# Patient Record
Sex: MALE | Race: WHITE | Employment: UNEMPLOYED | URBAN - METROPOLITAN AREA
[De-identification: names, ages, dates, MRNs, and addresses within clinical notes are randomized per-mention and may not be internally consistent; named-entity substitution may affect disease eponyms.]

---

## 2017-01-17 RX ORDER — LISINOPRIL AND HYDROCHLOROTHIAZIDE 25; 20 MG/1; MG/1
TABLET ORAL
Qty: 30 TABLET | Refills: 0 | Status: SHIPPED | OUTPATIENT
Start: 2017-01-17 | End: 2017-03-27 | Stop reason: SDUPTHER

## 2017-03-27 RX ORDER — LISINOPRIL AND HYDROCHLOROTHIAZIDE 25; 20 MG/1; MG/1
TABLET ORAL
Qty: 30 TABLET | Refills: 0 | Status: SHIPPED | OUTPATIENT
Start: 2017-03-27 | End: 2017-03-28 | Stop reason: SDUPTHER

## 2017-03-28 ENCOUNTER — OFFICE VISIT (OUTPATIENT)
Dept: FAMILY MEDICINE CLINIC | Age: 72
End: 2017-03-28

## 2017-03-28 VITALS
HEART RATE: 76 BPM | DIASTOLIC BLOOD PRESSURE: 82 MMHG | SYSTOLIC BLOOD PRESSURE: 122 MMHG | BODY MASS INDEX: 28.4 KG/M2 | WEIGHT: 227.2 LBS

## 2017-03-28 DIAGNOSIS — N40.1 BENIGN NON-NODULAR PROSTATIC HYPERPLASIA WITH LOWER URINARY TRACT SYMPTOMS: Chronic | ICD-10-CM

## 2017-03-28 DIAGNOSIS — I10 ESSENTIAL HYPERTENSION: ICD-10-CM

## 2017-03-28 DIAGNOSIS — R35.0 FREQUENT URINATION: Primary | ICD-10-CM

## 2017-03-28 LAB
BILIRUBIN, POC: NORMAL
BLOOD URINE, POC: NORMAL
CLARITY, POC: CLEAR
COLOR, POC: YELLOW
GLUCOSE URINE, POC: NORMAL
KETONES, POC: 15
LEUKOCYTE EST, POC: NORMAL
NITRITE, POC: NORMAL
PH, POC: 5
PROTEIN, POC: 30
SPECIFIC GRAVITY, POC: 1.02
UROBILINOGEN, POC: 0.2

## 2017-03-28 PROCEDURE — 99214 OFFICE O/P EST MOD 30 MIN: CPT

## 2017-03-28 PROCEDURE — 81003 URINALYSIS AUTO W/O SCOPE: CPT

## 2017-03-28 RX ORDER — LISINOPRIL AND HYDROCHLOROTHIAZIDE 25; 20 MG/1; MG/1
TABLET ORAL
Qty: 90 TABLET | Refills: 3 | Status: SHIPPED | OUTPATIENT
Start: 2017-03-28 | End: 2018-05-31 | Stop reason: SDUPTHER

## 2017-03-29 ASSESSMENT — ENCOUNTER SYMPTOMS
GASTROINTESTINAL NEGATIVE: 1
BLURRED VISION: 0
COUGH: 0
SHORTNESS OF BREATH: 0
BACK PAIN: 0

## 2017-04-25 ENCOUNTER — OFFICE VISIT (OUTPATIENT)
Dept: FAMILY MEDICINE CLINIC | Age: 72
End: 2017-04-25

## 2017-04-25 VITALS
WEIGHT: 223.8 LBS | HEIGHT: 76 IN | DIASTOLIC BLOOD PRESSURE: 78 MMHG | TEMPERATURE: 98.8 F | BODY MASS INDEX: 27.25 KG/M2 | SYSTOLIC BLOOD PRESSURE: 116 MMHG

## 2017-04-25 DIAGNOSIS — J06.9 UPPER RESPIRATORY TRACT INFECTION, UNSPECIFIED TYPE: Primary | ICD-10-CM

## 2017-04-25 DIAGNOSIS — J40 BRONCHITIS: ICD-10-CM

## 2017-04-25 PROCEDURE — 99213 OFFICE O/P EST LOW 20 MIN: CPT

## 2017-04-25 RX ORDER — AZITHROMYCIN 250 MG/1
TABLET, FILM COATED ORAL
Qty: 1 PACKET | Refills: 0 | Status: SHIPPED | OUTPATIENT
Start: 2017-04-25 | End: 2017-05-05

## 2017-04-25 ASSESSMENT — ENCOUNTER SYMPTOMS
RHINORRHEA: 1
HEARTBURN: 0
COUGH: 1
SORE THROAT: 1
SHORTNESS OF BREATH: 0

## 2017-08-08 ENCOUNTER — NURSE ONLY (OUTPATIENT)
Dept: FAMILY MEDICINE CLINIC | Age: 72
End: 2017-08-08

## 2017-08-08 VITALS — SYSTOLIC BLOOD PRESSURE: 159 MMHG | DIASTOLIC BLOOD PRESSURE: 100 MMHG

## 2017-08-08 DIAGNOSIS — I10 ESSENTIAL HYPERTENSION: Primary | ICD-10-CM

## 2017-09-13 ENCOUNTER — OFFICE VISIT (OUTPATIENT)
Dept: FAMILY MEDICINE CLINIC | Age: 72
End: 2017-09-13

## 2017-09-13 VITALS
WEIGHT: 226.6 LBS | DIASTOLIC BLOOD PRESSURE: 70 MMHG | BODY MASS INDEX: 27.95 KG/M2 | SYSTOLIC BLOOD PRESSURE: 126 MMHG | HEART RATE: 72 BPM

## 2017-09-13 DIAGNOSIS — Z11.59 NEED FOR HEPATITIS C SCREENING TEST: ICD-10-CM

## 2017-09-13 DIAGNOSIS — Z13.1 ENCOUNTER FOR SCREENING FOR DIABETES MELLITUS: ICD-10-CM

## 2017-09-13 DIAGNOSIS — G47.30 SLEEP APNEA, UNSPECIFIED TYPE: ICD-10-CM

## 2017-09-13 DIAGNOSIS — I10 ESSENTIAL HYPERTENSION: Primary | ICD-10-CM

## 2017-09-13 DIAGNOSIS — B36.9 FUNGAL DERMATITIS: ICD-10-CM

## 2017-09-13 PROCEDURE — 99214 OFFICE O/P EST MOD 30 MIN: CPT | Performed by: FAMILY MEDICINE

## 2017-09-13 PROCEDURE — 90471 IMMUNIZATION ADMIN: CPT | Performed by: FAMILY MEDICINE

## 2017-09-13 PROCEDURE — 90732 PPSV23 VACC 2 YRS+ SUBQ/IM: CPT | Performed by: FAMILY MEDICINE

## 2017-09-13 PROCEDURE — 90688 IIV4 VACCINE SPLT 0.5 ML IM: CPT | Performed by: FAMILY MEDICINE

## 2017-09-13 PROCEDURE — 90472 IMMUNIZATION ADMIN EACH ADD: CPT | Performed by: FAMILY MEDICINE

## 2017-09-13 RX ORDER — KETOCONAZOLE 20 MG/G
1 CREAM TOPICAL 2 TIMES DAILY
Qty: 60 G | Refills: 3 | Status: SHIPPED | OUTPATIENT
Start: 2017-09-13 | End: 2019-04-29

## 2017-09-13 ASSESSMENT — PATIENT HEALTH QUESTIONNAIRE - PHQ9
SUM OF ALL RESPONSES TO PHQ QUESTIONS 1-9: 0
SUM OF ALL RESPONSES TO PHQ9 QUESTIONS 1 & 2: 0
1. LITTLE INTEREST OR PLEASURE IN DOING THINGS: 0
2. FEELING DOWN, DEPRESSED OR HOPELESS: 0

## 2018-05-31 RX ORDER — LISINOPRIL AND HYDROCHLOROTHIAZIDE 25; 20 MG/1; MG/1
TABLET ORAL
Qty: 30 TABLET | Refills: 0 | Status: SHIPPED | OUTPATIENT
Start: 2018-05-31 | End: 2018-07-13 | Stop reason: SDUPTHER

## 2018-07-13 RX ORDER — LISINOPRIL AND HYDROCHLOROTHIAZIDE 25; 20 MG/1; MG/1
TABLET ORAL
Qty: 30 TABLET | Refills: 0 | Status: SHIPPED | OUTPATIENT
Start: 2018-07-13 | End: 2018-08-03 | Stop reason: SDUPTHER

## 2018-08-03 ENCOUNTER — OFFICE VISIT (OUTPATIENT)
Dept: FAMILY MEDICINE CLINIC | Age: 73
End: 2018-08-03

## 2018-08-03 VITALS
WEIGHT: 229.6 LBS | OXYGEN SATURATION: 96 % | SYSTOLIC BLOOD PRESSURE: 126 MMHG | BODY MASS INDEX: 28.55 KG/M2 | DIASTOLIC BLOOD PRESSURE: 72 MMHG | HEIGHT: 75 IN | HEART RATE: 74 BPM

## 2018-08-03 DIAGNOSIS — G47.30 SLEEP APNEA, UNSPECIFIED TYPE: ICD-10-CM

## 2018-08-03 DIAGNOSIS — I10 ESSENTIAL HYPERTENSION: Primary | ICD-10-CM

## 2018-08-03 DIAGNOSIS — M79.672 PAIN OF LEFT HEEL: ICD-10-CM

## 2018-08-03 DIAGNOSIS — M25.572 LEFT ANKLE PAIN, UNSPECIFIED CHRONICITY: ICD-10-CM

## 2018-08-03 DIAGNOSIS — R42 VERTIGO: ICD-10-CM

## 2018-08-03 DIAGNOSIS — Z72.89 OTHER PROBLEMS RELATED TO LIFESTYLE: ICD-10-CM

## 2018-08-03 DIAGNOSIS — D36.9 ADENOMA: ICD-10-CM

## 2018-08-03 PROCEDURE — 99214 OFFICE O/P EST MOD 30 MIN: CPT | Performed by: FAMILY MEDICINE

## 2018-08-03 RX ORDER — LISINOPRIL AND HYDROCHLOROTHIAZIDE 25; 20 MG/1; MG/1
TABLET ORAL
Qty: 90 TABLET | Refills: 1 | Status: SHIPPED | OUTPATIENT
Start: 2018-08-03 | End: 2019-04-10 | Stop reason: SDUPTHER

## 2018-08-03 NOTE — PROGRESS NOTES
Subjective:  Mara Benítez presents for   Chief Complaint   Patient presents with    Hypertension     It has been good     Sleep Apnea    Ankle Pain     left and has been going on for 4-6 weeks     Dizziness     started 2 weeks ago        Is using the cpap    Step in something and has a callous on the heel. lorenza orccured a long time ago. Can play golf ok. Wearing shoes are better. Doesn't check the bp at home. Is cpap machine is old and breaking, needs a repleacement. He usese it every night and it has been very use ful for him. Would like a replace,net    everyonce in a while he will get up wuickly and lffeel lightl headed. Patient Active Problem List   Diagnosis    Erectile dysfunction    Sleep apnea    Osteoarthritis    DJD (degenerative joint disease)    Hernia    BPH (benign prostatic hyperplasia)    Essential hypertension       Review of Systems:  · General: no significant weight changes. · Respiratory: no cough, pleuritic chest pain, dyspnea, or wheezing  · Cardiovascular: no pain, HARVEY, orthopnea, palpitations, or claudication  · Gastrointestinal: no chronic nausea, vomiting, heartburn, diarrhea, constipation, bloating, or abdominal pain. No bloody or black stools. Objective:  Physical Exam   Vitals:   Vitals:    08/03/18 1134   BP: 126/72   Pulse: 74   SpO2: 96%   Weight: 229 lb 9.6 oz (104.1 kg)   Height: 6' 3\" (1.905 m)     Wt Readings from Last 3 Encounters:   08/03/18 229 lb 9.6 oz (104.1 kg)   09/13/17 226 lb 9.6 oz (102.8 kg)   04/25/17 223 lb 12.8 oz (101.5 kg)     Ht Readings from Last 3 Encounters:   08/03/18 6' 3\" (1.905 m)   04/25/17 6' 3.5\" (1.918 m)   11/17/15 6' 3\" (1.905 m)     Body mass index is 28.7 kg/m². Constitutional: He is oriented to person, place, and time. He appears well-developed and well-nourished and in no acute distress. Answers all my questions appropriately. Head: Normocephalic and atraumatic.    Eyes:conjunctiva appear normal.  Right Ear: External ear normal. TM is clear  Left Ear: External ear normal. TM is clear  Nose: pink, non-edematous mucosa. No polyps. No septal deviation  Throat: no erythema, tonsillar hypertrophy or exudate. No ulcerations noted. Lips/Teeth/Gums all appear normal.  Neck: Normal range of motion. Neck supple. No tracheal deviation present. No abnormal lymphadenopathy. No JVD noted. Carotids are clear bilaterally. No thyroid masses noted. Heart: RRR without murmur. No S3, S4, or gallop noted. Chest: Clear to auscultation bilaterally. Good breath sounds noted. No rales, wheezes, or rhonchi noted. No respiratory retractions noted. Wall has symmetrical movement with respirations. Left heel - he has a large  (2cm) palntars wart present. Has mild left lateral ankle pain when I twist it. Assessment:   Encounter Diagnoses   Name Primary?  Essential hypertension Yes    Sleep apnea, unspecified type     Left ankle pain, unspecified chronicity     Vertigo     Other problems related to lifestyle     Adenoma     Pain of left heel          Plan:   Medications Discontinued During This Encounter   Medication Reason    lisinopril-hydrochlorothiazide (PRINZIDE;ZESTORETIC) 20-25 MG per tablet REORDER     THE ABOVE NOTED DISCONTINUED MEDS MAY ONLY BE FROM 'CLEANING UP' THE MED LIST AND WERE NOT ACTUALLY CANCELED;  SEE CHART FOR DETAILS!   Orders Placed This Encounter   Medications    CPAP Machine MISC     Sig: by Does not apply route For continuous use at HS    DX: eliane     Dispense:  1 each     Refill:  0    Respiratory Therapy Supplies MISC     Si each by Does not apply route continuous To use with CPAP machine     Dispense:  1 each     Refill:  11    Respiratory Therapy Supplies MISC     Si each by Does not apply route continuous To use with the CPAP machine     Dispense:  1 each     Refill:  11    lisinopril-hydrochlorothiazide (PRINZIDE;ZESTORETIC) 20-25 MG per tablet     Sig: take 1 tablet by mouth once

## 2018-08-06 ENCOUNTER — HOSPITAL ENCOUNTER (OUTPATIENT)
Age: 73
Setting detail: SPECIMEN
Discharge: HOME | End: 2018-08-06
Payer: MEDICARE

## 2018-08-06 DIAGNOSIS — I10 ESSENTIAL HYPERTENSION: ICD-10-CM

## 2018-08-06 DIAGNOSIS — Z72.89 OTHER PROBLEMS RELATED TO LIFESTYLE: ICD-10-CM

## 2018-08-06 LAB
ABSOLUTE EOS #: 0.12 K/UL (ref 0–0.44)
ABSOLUTE IMMATURE GRANULOCYTE: 0.03 K/UL (ref 0–0.3)
ABSOLUTE LYMPH #: 0.96 K/UL (ref 1.1–3.7)
ABSOLUTE MONO #: 0.52 K/UL (ref 0.1–1.2)
ALBUMIN SERPL-MCNC: 4.5 G/DL (ref 3.5–5.2)
ALBUMIN/GLOBULIN RATIO: 1.8 (ref 1–2.5)
ALP BLD-CCNC: 68 U/L (ref 40–129)
ALT SERPL-CCNC: 20 U/L (ref 5–41)
ANION GAP SERPL CALCULATED.3IONS-SCNC: 13 MMOL/L (ref 9–17)
AST SERPL-CCNC: 24 U/L
BASOPHILS # BLD: 1 % (ref 0–2)
BASOPHILS ABSOLUTE: 0.07 K/UL (ref 0–0.2)
BILIRUB SERPL-MCNC: 0.52 MG/DL (ref 0.3–1.2)
BUN BLDV-MCNC: 22 MG/DL (ref 8–23)
BUN/CREAT BLD: ABNORMAL (ref 9–20)
CALCIUM SERPL-MCNC: 9.5 MG/DL (ref 8.6–10.4)
CHLORIDE BLD-SCNC: 101 MMOL/L (ref 98–107)
CHOLESTEROL/HDL RATIO: 7.3
CHOLESTEROL: 247 MG/DL
CO2: 26 MMOL/L (ref 20–31)
CREAT SERPL-MCNC: 1.16 MG/DL (ref 0.7–1.2)
DIFFERENTIAL TYPE: ABNORMAL
EOSINOPHILS RELATIVE PERCENT: 2 % (ref 1–4)
GFR AFRICAN AMERICAN: >60 ML/MIN
GFR NON-AFRICAN AMERICAN: >60 ML/MIN
GFR SERPL CREATININE-BSD FRML MDRD: ABNORMAL ML/MIN/{1.73_M2}
GFR SERPL CREATININE-BSD FRML MDRD: ABNORMAL ML/MIN/{1.73_M2}
GLUCOSE BLD-MCNC: 106 MG/DL (ref 70–99)
HCT VFR BLD CALC: 48.1 % (ref 40.7–50.3)
HDLC SERPL-MCNC: 34 MG/DL
HEMOGLOBIN: 16.2 G/DL (ref 13–17)
HEPATITIS C ANTIBODY: NONREACTIVE
IMMATURE GRANULOCYTES: 1 %
LDL CHOLESTEROL DIRECT: 98 MG/DL
LDL CHOLESTEROL: ABNORMAL MG/DL (ref 0–130)
LYMPHOCYTES # BLD: 16 % (ref 24–43)
MCH RBC QN AUTO: 31.5 PG (ref 25.2–33.5)
MCHC RBC AUTO-ENTMCNC: 33.7 G/DL (ref 28.4–34.8)
MCV RBC AUTO: 93.6 FL (ref 82.6–102.9)
MONOCYTES # BLD: 9 % (ref 3–12)
NRBC AUTOMATED: 0 PER 100 WBC
PDW BLD-RTO: 13 % (ref 11.8–14.4)
PLATELET # BLD: 121 K/UL (ref 138–453)
PLATELET ESTIMATE: ABNORMAL
PMV BLD AUTO: 12.7 FL (ref 8.1–13.5)
POTASSIUM SERPL-SCNC: 4.3 MMOL/L (ref 3.7–5.3)
RBC # BLD: 5.14 M/UL (ref 4.21–5.77)
RBC # BLD: ABNORMAL 10*6/UL
SEG NEUTROPHILS: 71 % (ref 36–65)
SEGMENTED NEUTROPHILS ABSOLUTE COUNT: 4.3 K/UL (ref 1.5–8.1)
SODIUM BLD-SCNC: 140 MMOL/L (ref 135–144)
TOTAL PROTEIN: 7 G/DL (ref 6.4–8.3)
TRIGL SERPL-MCNC: 445 MG/DL
TSH SERPL DL<=0.05 MIU/L-ACNC: 3.81 MIU/L (ref 0.3–5)
VLDLC SERPL CALC-MCNC: ABNORMAL MG/DL (ref 1–30)
WBC # BLD: 6 K/UL (ref 3.5–11.3)
WBC # BLD: ABNORMAL 10*3/UL

## 2018-08-08 ENCOUNTER — TELEPHONE (OUTPATIENT)
Dept: FAMILY MEDICINE CLINIC | Age: 73
End: 2018-08-08

## 2018-08-08 NOTE — TELEPHONE ENCOUNTER
Pharmacy counter did not get the fax, please re fax    Patient would like you to call him and confirm it was sent

## 2018-08-09 NOTE — TELEPHONE ENCOUNTER
Found post it note on my desk when arriving saying that patient wants to pick order up.  lmor for patient, order is ready for pickup.

## 2019-04-10 RX ORDER — LISINOPRIL AND HYDROCHLOROTHIAZIDE 25; 20 MG/1; MG/1
TABLET ORAL
Qty: 90 TABLET | Refills: 3 | Status: SHIPPED | OUTPATIENT
Start: 2019-04-10 | End: 2020-04-27

## 2019-04-10 NOTE — TELEPHONE ENCOUNTER
Last visit: 08/03/18  Last Med refill:   Does patient have enough medication for 72 hours:     Next Visit Date:  No future appointments.     Health Maintenance   Topic Date Due    DTaP/Tdap/Td vaccine (1 - Tdap) 10/25/1965    Shingles Vaccine (1 of 2) 10/25/1996    Colon cancer screen colonoscopy  08/16/2018    Pneumococcal 65+ years Vaccine (2 of 2 - PCV13) 09/13/2018    Potassium monitoring  08/06/2019    Creatinine monitoring  08/06/2019    Flu vaccine (Season Ended) 09/01/2019    Lipid screen  08/06/2023    Hepatitis C screen  Completed       Hemoglobin A1C (%)   Date Value   12/11/2012 4.9             ( goal A1C is < 7)   No results found for: LABMICR  LDL Cholesterol (mg/dL)   Date Value   08/06/2018 12/11/2012 120 (H)       (goal LDL is <100)   AST (U/L)   Date Value   08/06/2018 24     ALT (U/L)   Date Value   08/06/2018 20     BUN (mg/dL)   Date Value   08/06/2018 22     BP Readings from Last 3 Encounters:   08/03/18 126/72   09/13/17 126/70   08/08/17 (!) 159/100          (goal 120/80)    All Future Testing planned in [facility]  Lab Frequency Next Occurrence               Patient Active Problem List:     Erectile dysfunction     Sleep apnea     Osteoarthritis     DJD (degenerative joint disease)     Hernia     BPH (benign prostatic hyperplasia)     Essential hypertension

## 2019-04-29 ENCOUNTER — OFFICE VISIT (OUTPATIENT)
Dept: FAMILY MEDICINE CLINIC | Age: 74
End: 2019-04-29

## 2019-04-29 VITALS
OXYGEN SATURATION: 98 % | SYSTOLIC BLOOD PRESSURE: 118 MMHG | DIASTOLIC BLOOD PRESSURE: 70 MMHG | WEIGHT: 237.38 LBS | HEART RATE: 73 BPM | BODY MASS INDEX: 29.67 KG/M2

## 2019-04-29 DIAGNOSIS — J40 BRONCHITIS: Primary | ICD-10-CM

## 2019-04-29 DIAGNOSIS — G47.30 SLEEP APNEA, UNSPECIFIED TYPE: ICD-10-CM

## 2019-04-29 PROCEDURE — 99213 OFFICE O/P EST LOW 20 MIN: CPT | Performed by: FAMILY MEDICINE

## 2019-04-29 RX ORDER — DOXYCYCLINE 100 MG/1
100 CAPSULE ORAL 2 TIMES DAILY WITH MEALS
Qty: 20 CAPSULE | Refills: 0 | Status: SHIPPED | OUTPATIENT
Start: 2019-04-29 | End: 2020-05-28

## 2019-04-29 ASSESSMENT — PATIENT HEALTH QUESTIONNAIRE - PHQ9
SUM OF ALL RESPONSES TO PHQ QUESTIONS 1-9: 0
2. FEELING DOWN, DEPRESSED OR HOPELESS: 0
SUM OF ALL RESPONSES TO PHQ QUESTIONS 1-9: 0
SUM OF ALL RESPONSES TO PHQ9 QUESTIONS 1 & 2: 0
1. LITTLE INTEREST OR PLEASURE IN DOING THINGS: 0

## 2019-04-29 NOTE — PROGRESS NOTES
Visit Information    Have you changed or started any medications since your last visit including any over-the-counter medicines, vitamins, or herbal medicines? no   Have you stopped taking any of your medications? Is so, why? -  no  Are you having any side effects from any of your medications? - no    Have you seen any other physician or provider since your last visit?  no   Have you had any other diagnostic tests since your last visit?  no   Have you been seen in the emergency room and/or had an admission in a hospital since we last saw you?  no   Have you had your routine dental cleaning in the past 6 months? Do you have an active MyChart account? If no, what is the barrier?   No:     Patient Care Team:  Christie Diaz MD as PCP - General (Family Medicine)  Albina Alvarez MD as Consulting Physician (Gastroenterology)    Medical History Review  Past Medical, Family, and Social History reviewed and  contribute to the patient presenting condition    Health Maintenance   Topic Date Due    DTaP/Tdap/Td vaccine (1 - Tdap) 10/25/1965    Shingles Vaccine (1 of 2) 10/25/1996    Colon cancer screen colonoscopy  08/16/2018    Pneumococcal 65+ years Vaccine (2 of 2 - PCV13) 09/13/2018    Potassium monitoring  08/06/2019    Creatinine monitoring  08/06/2019    Flu vaccine (Season Ended) 09/01/2019    Lipid screen  08/06/2023    Hepatitis C screen  Completed

## 2019-04-29 NOTE — PROGRESS NOTES
Subjective:  Buzz Mason presents for   Chief Complaint   Patient presents with    Cough     on and off x 3 weeks. non productive. shallow breathing.  Pharyngitis    Sleep Apnea     needs refill of c-pap mask     Had a full blown head cold    Fluids are good    otc helps a little    No fevers. currently the cough is the biggest problem    Needs an rx for the cpap mask      Patient Active Problem List   Diagnosis    Erectile dysfunction    Sleep apnea    Osteoarthritis    DJD (degenerative joint disease)    Hernia    BPH (benign prostatic hyperplasia)    Essential hypertension       Review of Systems:  · General: no significant weight changes. · Respiratory: no cough, pleuritic chest pain, dyspnea, or wheezing  · Cardiovascular: no pain, HARVEY, orthopnea, palpitations, or claudication  · Gastrointestinal: no chronic nausea, vomiting, heartburn, diarrhea, constipation, bloating, or abdominal pain. No bloody or black stools. Objective:  Physical Exam   Vitals:   Vitals:    04/29/19 1043   BP: 118/70   Pulse: 73   SpO2: 98%   Weight: 237 lb 6 oz (107.7 kg)     Wt Readings from Last 3 Encounters:   04/29/19 237 lb 6 oz (107.7 kg)   08/03/18 229 lb 9.6 oz (104.1 kg)   09/13/17 226 lb 9.6 oz (102.8 kg)     Ht Readings from Last 3 Encounters:   08/03/18 6' 3\" (1.905 m)   04/25/17 6' 3.5\" (1.918 m)   11/17/15 6' 3\" (1.905 m)     Body mass index is 29.67 kg/m². Constitutional: He is oriented to person, place, and time. He appears well-developed and well-nourished and in no acute distress. Answers all my questions appropriately. Head: Normocephalic and atraumatic. Eyes:conjunctiva appear normal.  Right Ear: External ear normal. TM is clear  Left Ear: External ear normal. TM is clear  Nose: pink, non-edematous mucosa. No polyps. No septal deviation  Throat: no erythema, tonsillar hypertrophy or exudate. No ulcerations noted. Lips/Teeth/Gums all appear normal.  Neck: Normal range of motion. Neck supple.

## 2019-05-22 ENCOUNTER — OFFICE VISIT (OUTPATIENT)
Dept: FAMILY MEDICINE CLINIC | Age: 74
End: 2019-05-22

## 2019-05-22 VITALS
DIASTOLIC BLOOD PRESSURE: 70 MMHG | OXYGEN SATURATION: 97 % | SYSTOLIC BLOOD PRESSURE: 104 MMHG | HEART RATE: 82 BPM | TEMPERATURE: 97.7 F | WEIGHT: 235 LBS | BODY MASS INDEX: 29.37 KG/M2

## 2019-05-22 DIAGNOSIS — M54.9 BACK PAIN, UNSPECIFIED BACK LOCATION, UNSPECIFIED BACK PAIN LATERALITY, UNSPECIFIED CHRONICITY: ICD-10-CM

## 2019-05-22 DIAGNOSIS — R05.9 COUGH: Primary | ICD-10-CM

## 2019-05-22 PROCEDURE — 99213 OFFICE O/P EST LOW 20 MIN: CPT | Performed by: FAMILY MEDICINE

## 2019-05-22 RX ORDER — DOXYCYCLINE 100 MG/1
100 CAPSULE ORAL 2 TIMES DAILY WITH MEALS
Qty: 20 CAPSULE | Refills: 0 | Status: SHIPPED | OUTPATIENT
Start: 2019-05-22 | End: 2020-05-28

## 2019-05-22 NOTE — PROGRESS NOTES
Visit Information    Have you changed or started any medications since your last visit including any over-the-counter medicines, vitamins, or herbal medicines? no   Have you stopped taking any of your medications? Is so, why? -  no  Are you having any side effects from any of your medications? - no    Have you seen any other physician or provider since your last visit?  no   Have you had any other diagnostic tests since your last visit?  no   Have you been seen in the emergency room and/or had an admission in a hospital since we last saw you?  no   Have you had your routine dental cleaning in the past 6 months? Do you have an active MyChart account? If no, what is the barrier?   No:     Patient Care Team:  Nahum Lowe MD as PCP - General (Family Medicine)  Winston Watson MD as Consulting Physician (Gastroenterology)    Medical History Review  Past Medical, Family, and Social History reviewed and  contribute to the patient presenting condition    Health Maintenance   Topic Date Due    DTaP/Tdap/Td vaccine (1 - Tdap) 10/25/1965    Shingles Vaccine (1 of 2) 10/25/1996    Colon cancer screen colonoscopy  08/16/2018    Pneumococcal 65+ years Vaccine (2 of 2 - PCV13) 09/13/2018    Potassium monitoring  08/06/2019    Creatinine monitoring  08/06/2019    Flu vaccine (Season Ended) 09/01/2019    Lipid screen  08/06/2023    Hepatitis C screen  Completed

## 2019-05-22 NOTE — PROGRESS NOTES
Subjective:  Erica Hodgson presents for   Chief Complaint   Patient presents with    Cough     3-4 times a week coughing up ligh yellow sputum.  Pharyngitis     scratchy sore throat    Headache     5-7 days a week. takes aspirin and it goes away    Spasms     back spasms mainly when working in the yard   this is a continutaiton from 4 weeks ago  Cough is much better, but still there. phslegm si on 3-4 tiems a weeks. No sob. Some other minor uri sx. Has working in the yard now, has some back discomfort - nothing signficant            Patient Active Problem List   Diagnosis    Erectile dysfunction    Sleep apnea    Osteoarthritis    DJD (degenerative joint disease)    Hernia    BPH (benign prostatic hyperplasia)    Essential hypertension       Review of Systems:  · General: no significant weight changes. · Cardiovascular: no pain, HARVEY, orthopnea, palpitations, or claudication  · Gastrointestinal: no chronic nausea, vomiting, heartburn, diarrhea, constipation, bloating, or abdominal pain. No bloody or black stools. Objective:  Physical Exam   Vitals:   Vitals:    05/22/19 1615   BP: 104/70   Pulse: 82   Temp: 97.7 °F (36.5 °C)   TempSrc: Oral   SpO2: 97%   Weight: 235 lb (106.6 kg)     Wt Readings from Last 3 Encounters:   05/22/19 235 lb (106.6 kg)   04/29/19 237 lb 6 oz (107.7 kg)   08/03/18 229 lb 9.6 oz (104.1 kg)     Ht Readings from Last 3 Encounters:   08/03/18 6' 3\" (1.905 m)   04/25/17 6' 3.5\" (1.918 m)   11/17/15 6' 3\" (1.905 m)     Body mass index is 29.37 kg/m². Constitutional: He is oriented to person, place, and time. He appears well-developed and well-nourished and in no acute distress. Answers all my questions appropriately. Head: Normocephalic and atraumatic. Eyes:conjunctiva appear normal.  Right Ear: External ear normal. TM is clear  Left Ear: External ear normal. TM is clear  Nose: pink, non-edematous mucosa. No polyps.   No septal deviation  Throat: no erythema, tonsillar hypertrophy or exudate. No ulcerations noted. Lips/Teeth/Gums all appear normal.  Neck: Normal range of motion. Neck supple. No tracheal deviation present. No abnormal lymphadenopathy. No JVD noted. Carotids are clear bilaterally. No thyroid masses noted. Heart: RRR without murmur. No S3, S4, or gallop noted. Chest: Clear to auscultation bilaterally. Good breath sounds noted. No rales, wheezes, or rhonchi noted. No respiratory retractions noted. Wall has symmetrical movement with respirations. Assessment:   Encounter Diagnoses   Name Primary?  Cough Yes    Back pain, unspecified back location, unspecified back pain laterality, unspecified chronicity          Plan:   There are no discontinued medications. THE ABOVE NOTED DISCONTINUED MEDS MAY ONLY BE FROM 'CLEANING UP' THE MED LIST AND WERE NOT ACTUALLY CANCELED;  SEE CHART FOR DETAILS! Orders Placed This Encounter   Medications    doxycycline monohydrate (MONODOX) 100 MG capsule     Sig: Take 1 capsule by mouth 2 times daily (with meals) Avoid calcium, mtv's and dairy 2 hours before and after     Dispense:  20 capsule     Refill:  0     Orders Placed This Encounter   Procedures    XR CHEST STANDARD (2 VW)     Standing Status:   Future     Standing Expiration Date:   5/21/2020     Order Specific Question:   Reason for exam:     Answer:   See ICDM-10 code attached     No follow-ups on file. There are no Patient Instructions on file for this visit. Data Unavailable      Call in 10 days if cogh not resolved.     Decreased work load on back

## 2019-05-23 ENCOUNTER — HOSPITAL ENCOUNTER (OUTPATIENT)
Age: 74
Discharge: HOME | End: 2019-05-25

## 2019-05-23 ENCOUNTER — HOSPITAL ENCOUNTER (OUTPATIENT)
Dept: GENERAL RADIOLOGY | Age: 74
Discharge: HOME | End: 2019-05-25

## 2019-05-23 DIAGNOSIS — R05.9 COUGH: ICD-10-CM

## 2019-05-23 PROCEDURE — 71046 X-RAY EXAM CHEST 2 VIEWS: CPT

## 2020-05-28 ENCOUNTER — OFFICE VISIT (OUTPATIENT)
Dept: FAMILY MEDICINE CLINIC | Age: 75
End: 2020-05-28

## 2020-05-28 VITALS
TEMPERATURE: 98.1 F | DIASTOLIC BLOOD PRESSURE: 60 MMHG | SYSTOLIC BLOOD PRESSURE: 120 MMHG | HEART RATE: 76 BPM | OXYGEN SATURATION: 94 %

## 2020-05-28 PROBLEM — D36.9 TUBULAR ADENOMA: Chronic | Status: ACTIVE | Noted: 2020-05-28

## 2020-05-28 PROBLEM — D36.9 TUBULAR ADENOMA: Status: ACTIVE | Noted: 2020-05-28

## 2020-05-28 PROCEDURE — 99214 OFFICE O/P EST MOD 30 MIN: CPT | Performed by: FAMILY MEDICINE

## 2020-05-28 ASSESSMENT — PATIENT HEALTH QUESTIONNAIRE - PHQ9
2. FEELING DOWN, DEPRESSED OR HOPELESS: 0
SUM OF ALL RESPONSES TO PHQ QUESTIONS 1-9: 0
SUM OF ALL RESPONSES TO PHQ QUESTIONS 1-9: 0
SUM OF ALL RESPONSES TO PHQ9 QUESTIONS 1 & 2: 0
1. LITTLE INTEREST OR PLEASURE IN DOING THINGS: 0

## 2020-05-28 NOTE — PROGRESS NOTES
Subjective:  Marc Urias presents for   Chief Complaint   Patient presents with    Hypertension     Tolerating the meds. Feels fine. bp at home is similar to today's    Is out of date with his colonosocpy. Was due in 2018    Patient Active Problem List   Diagnosis    Erectile dysfunction    Sleep apnea    Osteoarthritis    DJD (degenerative joint disease)    Hernia    BPH (benign prostatic hyperplasia)    Essential hypertension         Review of Systems:  · General: no significant weight changes. · Respiratory: no cough, pleuritic chest pain, dyspnea, or wheezing  · Cardiovascular: no pain, HARVEY, orthopnea, palpitations or claudication  · Gastrointestinal: no chronic nausea, vomiting, heartburn, diarrhea, constipation, bloating, or abdominal pain. No bloody or black stools. Objective:  Physical Exam   Vitals:   Vitals:    05/28/20 1333   BP: 120/60   Pulse: 76   Temp: 98.1 °F (36.7 °C)   TempSrc: Temporal   SpO2: 94%     Wt Readings from Last 3 Encounters:   05/22/19 235 lb (106.6 kg)   04/29/19 237 lb 6 oz (107.7 kg)   08/03/18 229 lb 9.6 oz (104.1 kg)     Ht Readings from Last 3 Encounters:   08/03/18 6' 3\" (1.905 m)   04/25/17 6' 3.5\" (1.918 m)   11/17/15 6' 3\" (1.905 m)     There is no height or weight on file to calculate BMI. Constitutional: He is oriented to person, place, and time. He appears well-developed and well-nourished and in no acute distress. Answers all my questions appropriately. Head: Normocephalic and atraumatic. Eyes:conjunctiva appear normal.  Right Ear: External ear normal. TM is clear  Left Ear: External ear normal. TM is clear  Nose: pink, non-edematous mucosa. No polyps. No septal deviation  Throat: no erythema, tonsillar hypertrophy or exudate. No ulcerations noted. Lips/Teeth/Gums all appear normal.  Neck: Normal range of motion. Neck supple. No tracheal deviation present. No abnormal lymphadenopathy. No JVD noted. Carotids are clear bilaterally.  No thyroid

## 2020-06-01 RX ORDER — LISINOPRIL AND HYDROCHLOROTHIAZIDE 25; 20 MG/1; MG/1
TABLET ORAL
Qty: 30 TABLET | Refills: 0 | Status: SHIPPED | OUTPATIENT
Start: 2020-06-01 | End: 2020-06-29

## 2020-06-29 RX ORDER — LISINOPRIL AND HYDROCHLOROTHIAZIDE 25; 20 MG/1; MG/1
TABLET ORAL
Qty: 30 TABLET | Refills: 0 | Status: SHIPPED | OUTPATIENT
Start: 2020-06-29 | End: 2020-08-12

## 2020-08-21 ENCOUNTER — OFFICE VISIT (OUTPATIENT)
Dept: GASTROENTEROLOGY | Age: 75
End: 2020-08-21

## 2020-08-21 VITALS — WEIGHT: 240 LBS | TEMPERATURE: 96.9 F | BODY MASS INDEX: 30 KG/M2 | RESPIRATION RATE: 18 BRPM

## 2020-08-21 PROCEDURE — 99999 PR OFFICE/OUTPT VISIT,PROCEDURE ONLY: CPT | Performed by: INTERNAL MEDICINE

## 2020-08-21 ASSESSMENT — ENCOUNTER SYMPTOMS
EYES NEGATIVE: 1
APNEA: 1
GASTROINTESTINAL NEGATIVE: 1
ALLERGIC/IMMUNOLOGIC NEGATIVE: 1

## 2020-08-21 NOTE — PROGRESS NOTES
right knee       CURRENT MEDICATIONS:    Current Outpatient Medications:     lisinopril-hydroCHLOROthiazide (PRINZIDE;ZESTORETIC) 20-25 MG per tablet, take 1 tablet by mouth once daily, Disp: 30 tablet, Rfl: 0    CPAP Machine MISC, by Does not apply route For continuous use at HS. Pressures at 12  DX: eliane, Disp: 1 each, Rfl: 0    CPAP Machine MISC, by Does not apply route For continuous use at HS  DX: eliane, Disp: 1 each, Rfl: 0    ALLERGIES:   No Known Allergies    FAMILY HISTORY: History reviewed. No pertinent family history.       SOCIAL HISTORY:   Social History     Socioeconomic History    Marital status:      Spouse name: Not on file    Number of children: Not on file    Years of education: Not on file    Highest education level: Not on file   Occupational History    Not on file   Social Needs    Financial resource strain: Not on file    Food insecurity     Worry: Not on file     Inability: Not on file    Transportation needs     Medical: Not on file     Non-medical: Not on file   Tobacco Use    Smoking status: Never Smoker    Smokeless tobacco: Never Used   Substance and Sexual Activity    Alcohol use: Yes     Comment: social    Drug use: No    Sexual activity: Not on file   Lifestyle    Physical activity     Days per week: Not on file     Minutes per session: Not on file    Stress: Not on file   Relationships    Social connections     Talks on phone: Not on file     Gets together: Not on file     Attends Rastafarian service: Not on file     Active member of club or organization: Not on file     Attends meetings of clubs or organizations: Not on file     Relationship status: Not on file    Intimate partner violence     Fear of current or ex partner: Not on file     Emotionally abused: Not on file     Physically abused: Not on file     Forced sexual activity: Not on file   Other Topics Concern    Not on file   Social History Narrative    Not on file       REVIEW OF SYSTEMS: [address] [place] [address] Gastroenterology  Office #: [phone]

## 2020-08-24 ENCOUNTER — TELEPHONE (OUTPATIENT)
Dept: GASTROENTEROLOGY | Age: 75
End: 2020-08-24

## 2020-08-24 RX ORDER — SODIUM, POTASSIUM,MAG SULFATES 17.5-3.13G
SOLUTION, RECONSTITUTED, ORAL ORAL
Qty: 1 BOTTLE | Refills: 0 | Status: SHIPPED | OUTPATIENT
Start: 2020-08-24 | End: 2021-04-14

## 2020-08-24 NOTE — TELEPHONE ENCOUNTER
Contacted Vincent to schedule colonoscopy ordered at 8/21/20 office visit. Discussed with Cyrus Fuller the Covid-19 testing requirement, he is agreeable. Suprep instructions e-mailed to Cyrus Fuller, he stated that he will pay out of pocket for it as he does not have prescription coverage. Covid-19 testing scheduled [email or web address]. Colonoscopy scheduled [email or web address] at [email or web address].

## 2020-09-20 ENCOUNTER — HOSPITAL ENCOUNTER (OUTPATIENT)
Dept: PREADMISSION TESTING | Age: 75
Setting detail: SPECIMEN
Discharge: HOME | End: 2020-09-24
Payer: MEDICARE

## 2020-09-21 RX ORDER — LISINOPRIL AND HYDROCHLOROTHIAZIDE 25; 20 MG/1; MG/1
TABLET ORAL
Qty: 30 TABLET | Refills: 0 | Status: SHIPPED | OUTPATIENT
Start: 2020-09-21 | End: 2020-11-02

## 2020-11-02 RX ORDER — LISINOPRIL AND HYDROCHLOROTHIAZIDE 25; 20 MG/1; MG/1
TABLET ORAL
Qty: 30 TABLET | Refills: 5 | Status: SHIPPED | OUTPATIENT
Start: 2020-11-02 | End: 2021-01-13 | Stop reason: SDUPTHER

## 2020-11-02 NOTE — TELEPHONE ENCOUNTER
Victorina Garcia is calling to request a refill on the following medication(s):    Medication Request:  Requested Prescriptions     Pending Prescriptions Disp Refills    lisinopril-hydroCHLOROthiazide (PRINZIDE;ZESTORETIC) 20-25 MG per tablet [Pharmacy Med Name: LISINOPRIL-HCTZ 20-25 MG TAB] 30 tablet 0     Sig: take 1 tablet by mouth once daily       Last Visit Date (If Applicable):  6/33/6145    Next Visit Date:    12/1/2020

## 2020-11-03 PROBLEM — M19.90 DJD (DEGENERATIVE JOINT DISEASE): Status: RESOLVED | Noted: 2020-11-03 | Resolved: 2020-11-03

## 2020-11-10 NOTE — TELEPHONE ENCOUNTER
Next Visit Date:  Future Appointments   Date Time Provider Charity Franny   12/1/2020  2:00 PM Dayna Carroll  [address] [facility] Maintenance   Topic Date Due    DTaP/Tdap/Td vaccine (1 - Tdap) 10/25/1965    PSA counseling  07/02/2013    Colon cancer screen colonoscopy  08/16/2018    Potassium monitoring  08/06/2019    Creatinine monitoring  08/06/2019    Lipid screen  08/06/2023    Flu vaccine  Completed    Shingles Vaccine  Completed    Pneumococcal 65+ years Vaccine  Completed    Hepatitis C screen  Completed    Hepatitis A vaccine  Aged Out    Hepatitis B vaccine  Aged Out    Hib vaccine  Aged Out    Meningococcal (ACWY) vaccine  Aged Out       Hemoglobin A1C (%)   Date Value   12/11/2012 4.9             ( goal A1C is < 7)   No results found for: LABMICR  LDL Cholesterol (mg/dL)   Date Value   08/06/2018 12/11/2012 120 (H)       (goal LDL is <100)   AST (U/L)   Date Value   08/06/2018 24     ALT (U/L)   Date Value   08/06/2018 20     BUN (mg/dL)   Date Value   08/06/2018 22     BP Readings from Last 3 Encounters:   05/28/20 120/60   05/22/19 104/70   04/29/19 118/70          (goal 120/80)    All Future Testing planned in [facility]  Lab Frequency Next Occurrence   CBC Auto Differential Once 05/28/2020   Comprehensive Metabolic Panel Once 50/12/4124   Lipid Panel Once 05/28/2020               Patient Active Problem List:     Erectile dysfunction     Sleep apnea     Osteoarthritis     DJD (degenerative joint disease)     Hernia     BPH (benign prostatic hyperplasia)     Essential hypertension     Tubular adenoma What Is The Patient's Gender: Male

## 2021-01-13 ENCOUNTER — TELEPHONE (OUTPATIENT)
Dept: FAMILY MEDICINE CLINIC | Age: 76
End: 2021-01-13

## 2021-01-13 RX ORDER — LISINOPRIL AND HYDROCHLOROTHIAZIDE 25; 20 MG/1; MG/1
TABLET ORAL
Qty: 30 TABLET | Refills: 5 | Status: SHIPPED | OUTPATIENT
Start: 2021-01-13 | End: 2021-11-04

## 2021-04-07 ENCOUNTER — NURSE TRIAGE (OUTPATIENT)
Dept: OTHER | Facility: CLINIC | Age: 76
End: 2021-04-07

## 2021-04-07 NOTE — TELEPHONE ENCOUNTER
Caller currently in the state [facility], therefore, caller was transferred for triage to SVEN Paul RN for nurse triage. Answer Assessment - Initial Assessment Questions  1. MAIN CONCERN OR SYMPTOM:  \"What is your main concern right now? \" \"What question do you have? \" \"What's the main symptom you're worried about? \" (e.g., fever, pain, redness, swelling)      General lack of energy. Feels good in the morning, decreased appetite, gets chilled and sweaty. 2. VACCINE: \"What vaccination did you receive? \" \"Is this your first or second shot? \" (e.g., none; [place], other)      Mindy Andrews on March 24th. 3. SYMPTOM ONSET: \"When did the generalized begin? \" (e.g., not relevant; hours, days)      Approx around March 28th and 29th    4. SYMPTOM SEVERITY: \"How bad is it? \"       Rates lack of energy about a 6/10 (10 being worse)                5. FEVER: \"Is there a fever? \" If so, ask: \"What is it, how was it measured, and when did it start? \"       No fevers     6. PAST REACTIONS: \"Have you reacted to immunizations before? \" If so, ask: \"What happened? \"      No past reactions     7. OTHER SYMPTOMS: \"Do you have any other symptoms? \"      Chills, fatigue, decreased appetite, sweats, chills    Protocols used: CORONAVIRUS (COVID-19) VACCINE QUESTIONS AND REACTIONS-ADULT-[place]
Pt transferred from NT with Daniela Bowden for feeling of fatigue- warm transferred to office with Yessica Sarmiento, thank you
have any other symptoms? \" (e.g., chest pain, fever, cough, SOB, vomiting, diarrhea, bleeding, other areas of pain)      No    7. PREGNANCY: \"Is there any chance you are pregnant? \" \"When was your last menstrual period? \"      N/A    Protocols used: WEAKNESS (GENERALIZED) AND FATIGUE-ADULT-[place]

## 2021-04-13 ENCOUNTER — HOSPITAL ENCOUNTER (OUTPATIENT)
Age: 76
Setting detail: SPECIMEN
Discharge: HOME | End: 2021-04-13

## 2021-04-13 DIAGNOSIS — I10 ESSENTIAL HYPERTENSION: ICD-10-CM

## 2021-04-13 LAB
ABSOLUTE EOS #: 0.07 K/UL (ref 0–0.44)
ABSOLUTE IMMATURE GRANULOCYTE: <0.03 K/UL (ref 0–0.3)
ABSOLUTE LYMPH #: 0.88 K/UL (ref 1.1–3.7)
ABSOLUTE MONO #: 0.37 K/UL (ref 0.1–1.2)
ALBUMIN SERPL-MCNC: 3.8 G/DL (ref 3.5–5.2)
ALBUMIN/GLOBULIN RATIO: 1.1 (ref 1–2.5)
ALP BLD-CCNC: 55 U/L (ref 40–129)
ALT SERPL-CCNC: 15 U/L (ref 5–41)
ANION GAP SERPL CALCULATED.3IONS-SCNC: 9 MMOL/L (ref 9–17)
AST SERPL-CCNC: 23 U/L
BASOPHILS # BLD: 1 % (ref 0–2)
BASOPHILS ABSOLUTE: 0.05 K/UL (ref 0–0.2)
BILIRUB SERPL-MCNC: 0.85 MG/DL (ref 0.3–1.2)
BUN BLDV-MCNC: 23 MG/DL (ref 8–23)
BUN/CREAT BLD: ABNORMAL (ref 9–20)
CALCIUM SERPL-MCNC: 9.5 MG/DL (ref 8.6–10.4)
CHLORIDE BLD-SCNC: 98 MMOL/L (ref 98–107)
CHOLESTEROL/HDL RATIO: 4.9
CHOLESTEROL: 163 MG/DL
CO2: 31 MMOL/L (ref 20–31)
CREAT SERPL-MCNC: 1.2 MG/DL (ref 0.7–1.2)
DIFFERENTIAL TYPE: ABNORMAL
EOSINOPHILS RELATIVE PERCENT: 2 % (ref 1–4)
GFR AFRICAN AMERICAN: >60 ML/MIN
GFR NON-AFRICAN AMERICAN: 59 ML/MIN
GFR SERPL CREATININE-BSD FRML MDRD: ABNORMAL ML/MIN/{1.73_M2}
GFR SERPL CREATININE-BSD FRML MDRD: ABNORMAL ML/MIN/{1.73_M2}
GLUCOSE BLD-MCNC: 104 MG/DL (ref 70–99)
HCT VFR BLD CALC: 43.6 % (ref 40.7–50.3)
HDLC SERPL-MCNC: 33 MG/DL
HEMOGLOBIN: 14.8 G/DL (ref 13–17)
IMMATURE GRANULOCYTES: 0 %
LDL CHOLESTEROL: 97 MG/DL (ref 0–130)
LYMPHOCYTES # BLD: 19 % (ref 24–43)
MCH RBC QN AUTO: 31.1 PG (ref 25.2–33.5)
MCHC RBC AUTO-ENTMCNC: 33.9 G/DL (ref 28.4–34.8)
MCV RBC AUTO: 91.6 FL (ref 82.6–102.9)
MONOCYTES # BLD: 8 % (ref 3–12)
NRBC AUTOMATED: 0 PER 100 WBC
PDW BLD-RTO: 11.9 % (ref 11.8–14.4)
PLATELET # BLD: 205 K/UL (ref 138–453)
PLATELET ESTIMATE: ABNORMAL
PMV BLD AUTO: 11.7 FL (ref 8.1–13.5)
POTASSIUM SERPL-SCNC: 3.8 MMOL/L (ref 3.7–5.3)
RBC # BLD: 4.76 M/UL (ref 4.21–5.77)
RBC # BLD: ABNORMAL 10*6/UL
SEG NEUTROPHILS: 70 % (ref 36–65)
SEGMENTED NEUTROPHILS ABSOLUTE COUNT: 3.14 K/UL (ref 1.5–8.1)
SODIUM BLD-SCNC: 138 MMOL/L (ref 135–144)
TOTAL PROTEIN: 7.2 G/DL (ref 6.4–8.3)
TRIGL SERPL-MCNC: 166 MG/DL
VLDLC SERPL CALC-MCNC: ABNORMAL MG/DL (ref 1–30)
WBC # BLD: 4.5 K/UL (ref 3.5–11.3)
WBC # BLD: ABNORMAL 10*3/UL

## 2021-04-14 ENCOUNTER — OFFICE VISIT (OUTPATIENT)
Dept: FAMILY MEDICINE CLINIC | Age: 76
End: 2021-04-14

## 2021-04-14 VITALS
OXYGEN SATURATION: 96 % | DIASTOLIC BLOOD PRESSURE: 64 MMHG | SYSTOLIC BLOOD PRESSURE: 110 MMHG | HEART RATE: 83 BPM | WEIGHT: 222.38 LBS | BODY MASS INDEX: 27.79 KG/M2

## 2021-04-14 DIAGNOSIS — G47.30 SLEEP APNEA, UNSPECIFIED TYPE: ICD-10-CM

## 2021-04-14 DIAGNOSIS — I10 ESSENTIAL HYPERTENSION: Primary | ICD-10-CM

## 2021-04-14 PROCEDURE — 99214 OFFICE O/P EST MOD 30 MIN: CPT | Performed by: FAMILY MEDICINE

## 2021-04-14 PROCEDURE — 90471 IMMUNIZATION ADMIN: CPT | Performed by: FAMILY MEDICINE

## 2021-04-14 PROCEDURE — 90715 TDAP VACCINE 7 YRS/> IM: CPT | Performed by: FAMILY MEDICINE

## 2021-04-14 ASSESSMENT — PATIENT HEALTH QUESTIONNAIRE - PHQ9
SUM OF ALL RESPONSES TO PHQ9 QUESTIONS 1 & 2: 0
1. LITTLE INTEREST OR PLEASURE IN DOING THINGS: 0
SUM OF ALL RESPONSES TO PHQ QUESTIONS 1-9: 0
SUM OF ALL RESPONSES TO PHQ QUESTIONS 1-9: 0

## 2021-04-14 NOTE — PROGRESS NOTES
Subjective:  Cristiane Jurado presents for   Chief Complaint   Patient presents with    Results     lab results from yesterday    Fatigue     got COVID vaccine 3/24/21. fatigue has not gone away. Is functioning ok    Just tired    No fevers or cough or sob    Is using he cpap    No other sx. Patient Active Problem List   Diagnosis    Erectile dysfunction    Sleep apnea    Osteoarthritis    Hernia    BPH (benign prostatic hyperplasia)    Essential hypertension    Tubular adenoma     ·     Objective:  Physical Exam   Vitals:   Vitals:    04/14/21 1023   BP: 110/64   Pulse: 83   SpO2: 96%   Weight: 222 lb 6 oz (100.9 kg)     Wt Readings from Last 3 Encounters:   04/14/21 222 lb 6 oz (100.9 kg)   08/21/20 240 lb (108.9 kg)   05/22/19 235 lb (106.6 kg)     Ht Readings from Last 3 Encounters:   08/03/18 6' 3\" (1.905 m)   04/25/17 6' 3.5\" (1.918 m)   11/17/15 6' 3\" (1.905 m)     Body mass index is 27.79 kg/m². Constitutional: He is oriented to person, place, and time. He appears well-developed and well-nourished and in no acute distress. Answers all my questions appropriately. Head: Normocephalic and atraumatic. Eyes:conjunctiva appear normal.  Nose: pink, non-edematous mucosa. No polyps. No septal deviation  Throat: no erythema, tonsillar hypertrophy or exudate. No ulcerations noted. Lips/Teeth/Gums all appear normal.  Neck: Normal range of motion. Neck supple. No tracheal deviation present. No abnormal lymphadenopathy. No JVD noted. Carotids are clear bilaterally. No thyroid masses noted. Heart: RRR without murmur. No S3, S4, or gallop noted. Chest: Clear to auscultation bilaterally. Good breath sounds noted. No rales, wheezes, or rhonchi noted. No respiratory retractions noted. Wall has symmetrical movement with respirations. Abdomen: No distension noted.  + bowel sounds in all quadrants which are normoactive. No bruits noted. No masses could be palpated.   No unusual pulsatile masses noted. To deep palpation, patient denied any significant pain. No rebound, guarding or rigidity noted to my exam.        Assessment:   Encounter Diagnoses   Name Primary?  Essential hypertension Yes    Sleep apnea, unspecified type          Plan: At this time I cannot attribute his sx to the covid vaccine becauise it has been 3 weeks.     overall he looks fine/    Get appropriate rest    Notify me if any worsenign of sx

## 2021-07-02 ENCOUNTER — NURSE TRIAGE (OUTPATIENT)
Dept: OTHER | Facility: CLINIC | Age: 76
End: 2021-07-02

## 2021-07-02 ENCOUNTER — TELEPHONE (OUTPATIENT)
Dept: FAMILY MEDICINE CLINIC | Age: 76
End: 2021-07-02

## 2021-07-02 NOTE — TELEPHONE ENCOUNTER
about 20 lbs of weight. Since then has noticed balance has been somewhat off but had not been interfering with daily activities. Denies any other symptoms. 11. PREGNANCY: \"Is there any chance you are pregnant? \" \"When was your last menstrual period? \"        n/a    Protocols used: [id number]    Received call from [facility] with Referral.IM. Brief description of triage: At about 0630 this morning, patient woke up and had a 15 minute period of dizziness. States for the first 30 seconds, was afraid he would faint. Had some heart palpitations and broke out into a sweat. Those symptoms have since resolved. Triage indicates for patient to Be seen today/ [facility] or walk in as back up    Care advice provided, patient verbalizes understanding; denies any other questions or concerns; instructed to call back for any new or worsening symptoms. Writer provided warm transfer to [facility] for appointment scheduling. Attention Provider: Thank you for allowing me to participate in the care of your patient. The patient was connected to triage in response to information provided to the [facility]. Please do not respond through this encounter as the response is not directed to a shared pool.

## 2021-07-02 NOTE — TELEPHONE ENCOUNTER
----- Message from Julissa Marshall sent at 7/2/2021  8:32 AM EDT -----  Subject: Message to Provider    QUESTIONS  Information for Provider? Return from nurse triage for same day appt   dizziness with heart palpitations for 15 mins this morning. Per    at office was told to have patient go to ER for cardiac workup. Patient   was advise and going to go to ER.  ---------------------------------------------------------------------------  --------------  CALL BACK INFO  What is the best way for the office to contact you? OK to leave message on   voicemail  Preferred Call Back Phone Number? [phone]  ---------------------------------------------------------------------------  --------------  SCRIPT ANSWERS  Relationship to Patient?  Self

## 2021-11-17 ENCOUNTER — TELEPHONE (OUTPATIENT)
Dept: FAMILY MEDICINE CLINIC | Age: 76
End: 2021-11-17

## 2021-11-17 NOTE — TELEPHONE ENCOUNTER
URINARY    Patient calling with symptoms of possible urinary tract infection      Symptoms include  Frequent urination. Can see a spot about once every  2 months. Denies any other symptoms    Symptoms have persisted for   A year or more      When was their last UTI   never    *This condition is eligible for an eVisit. If not already active, sign patient up for MyChart to improve access and communication with the provider. *

## 2021-11-17 NOTE — TELEPHONE ENCOUNTER
Pt is asking that an order be sent to [address] [place] [address] equipment [facility] for Cpap supplies.   He needs the mask, hose, and  Head strap

## 2021-11-18 ENCOUNTER — HOSPITAL ENCOUNTER (OUTPATIENT)
Age: 76
Setting detail: SPECIMEN
Discharge: HOME | End: 2021-11-18

## 2021-11-18 ENCOUNTER — OFFICE VISIT (OUTPATIENT)
Dept: FAMILY MEDICINE CLINIC | Age: 76
End: 2021-11-18
Payer: MEDICARE

## 2021-11-18 VITALS
OXYGEN SATURATION: 95 % | WEIGHT: 239 LBS | SYSTOLIC BLOOD PRESSURE: 122 MMHG | BODY MASS INDEX: 29.87 KG/M2 | DIASTOLIC BLOOD PRESSURE: 68 MMHG | HEART RATE: 77 BPM

## 2021-11-18 DIAGNOSIS — R73.9 HYPERGLYCEMIA: ICD-10-CM

## 2021-11-18 DIAGNOSIS — N40.0 ENLARGED PROSTATE: ICD-10-CM

## 2021-11-18 DIAGNOSIS — Z23 NEED FOR VACCINATION: ICD-10-CM

## 2021-11-18 DIAGNOSIS — R35.0 URINARY FREQUENCY: Primary | ICD-10-CM

## 2021-11-18 LAB
ABSOLUTE EOS #: 0.14 K/UL (ref 0–0.44)
ABSOLUTE IMMATURE GRANULOCYTE: <0.03 K/UL (ref 0–0.3)
ABSOLUTE LYMPH #: 1.44 K/UL (ref 1.1–3.7)
ABSOLUTE MONO #: 0.61 K/UL (ref 0.1–1.2)
ALBUMIN SERPL-MCNC: 4.4 G/DL (ref 3.5–5.2)
ANION GAP SERPL CALCULATED.3IONS-SCNC: 15 MMOL/L (ref 9–17)
BASOPHILS # BLD: 1 % (ref 0–2)
BASOPHILS ABSOLUTE: 0.06 K/UL (ref 0–0.2)
BUN BLDV-MCNC: 23 MG/DL (ref 8–23)
BUN/CREAT BLD: ABNORMAL (ref 9–20)
CALCIUM SERPL-MCNC: 9.7 MG/DL (ref 8.6–10.4)
CHLORIDE BLD-SCNC: 101 MMOL/L (ref 98–107)
CO2: 25 MMOL/L (ref 20–31)
CREAT SERPL-MCNC: 1.29 MG/DL (ref 0.7–1.2)
DIFFERENTIAL TYPE: ABNORMAL
EOSINOPHILS RELATIVE PERCENT: 2 % (ref 1–4)
GFR AFRICAN AMERICAN: >60 ML/MIN
GFR NON-AFRICAN AMERICAN: 54 ML/MIN
GFR SERPL CREATININE-BSD FRML MDRD: ABNORMAL ML/MIN/{1.73_M2}
GFR SERPL CREATININE-BSD FRML MDRD: ABNORMAL ML/MIN/{1.73_M2}
GLUCOSE BLD-MCNC: 99 MG/DL (ref 70–99)
HCT VFR BLD CALC: 47.4 % (ref 40.7–50.3)
HEMOGLOBIN: 15.8 G/DL (ref 13–17)
IMMATURE GRANULOCYTES: 0 %
LYMPHOCYTES # BLD: 22 % (ref 24–43)
MAGNESIUM: 2 MG/DL (ref 1.6–2.6)
MCH RBC QN AUTO: 31.3 PG (ref 25.2–33.5)
MCHC RBC AUTO-ENTMCNC: 33.3 G/DL (ref 28.4–34.8)
MCV RBC AUTO: 93.9 FL (ref 82.6–102.9)
MONOCYTES # BLD: 9 % (ref 3–12)
NRBC AUTOMATED: 0 PER 100 WBC
PDW BLD-RTO: 13 % (ref 11.8–14.4)
PHOSPHORUS: 2.7 MG/DL (ref 2.5–4.5)
PLATELET # BLD: 149 K/UL (ref 138–453)
PLATELET ESTIMATE: ABNORMAL
PMV BLD AUTO: 12.2 FL (ref 8.1–13.5)
POTASSIUM SERPL-SCNC: 3.7 MMOL/L (ref 3.7–5.3)
RBC # BLD: 5.05 M/UL (ref 4.21–5.77)
RBC # BLD: ABNORMAL 10*6/UL
SEG NEUTROPHILS: 66 % (ref 36–65)
SEGMENTED NEUTROPHILS ABSOLUTE COUNT: 4.37 K/UL (ref 1.5–8.1)
SODIUM BLD-SCNC: 141 MMOL/L (ref 135–144)
WBC # BLD: 6.6 K/UL (ref 3.5–11.3)
WBC # BLD: ABNORMAL 10*3/UL

## 2021-11-18 PROCEDURE — 3017F COLORECTAL CA SCREEN DOC REV: CPT | Performed by: FAMILY MEDICINE

## 2021-11-18 PROCEDURE — G0008 ADMIN INFLUENZA VIRUS VAC: HCPCS | Performed by: FAMILY MEDICINE

## 2021-11-18 PROCEDURE — G8484 FLU IMMUNIZE NO ADMIN: HCPCS | Performed by: FAMILY MEDICINE

## 2021-11-18 PROCEDURE — 1123F ACP DISCUSS/DSCN MKR DOCD: CPT | Performed by: FAMILY MEDICINE

## 2021-11-18 PROCEDURE — 1036F TOBACCO NON-USER: CPT | Performed by: FAMILY MEDICINE

## 2021-11-18 PROCEDURE — 90694 VACC AIIV4 NO PRSRV 0.5ML IM: CPT | Performed by: FAMILY MEDICINE

## 2021-11-18 PROCEDURE — G8427 DOCREV CUR MEDS BY ELIG CLIN: HCPCS | Performed by: FAMILY MEDICINE

## 2021-11-18 PROCEDURE — 99214 OFFICE O/P EST MOD 30 MIN: CPT | Performed by: FAMILY MEDICINE

## 2021-11-18 PROCEDURE — 4040F PNEUMOC VAC/ADMIN/RCVD: CPT | Performed by: FAMILY MEDICINE

## 2021-11-18 PROCEDURE — G8417 CALC BMI ABV UP PARAM F/U: HCPCS | Performed by: FAMILY MEDICINE

## 2021-11-18 RX ORDER — TAMSULOSIN HYDROCHLORIDE 0.4 MG/1
0.4 CAPSULE ORAL DAILY
Qty: 90 CAPSULE | Refills: 0 | Status: ON HOLD | OUTPATIENT
Start: 2021-11-18 | End: 2022-07-07 | Stop reason: ALTCHOICE

## 2021-11-18 SDOH — ECONOMIC STABILITY: FOOD INSECURITY: WITHIN THE PAST 12 MONTHS, THE FOOD YOU BOUGHT JUST DIDN'T LAST AND YOU DIDN'T HAVE MONEY TO GET MORE.: NEVER TRUE

## 2021-11-18 SDOH — ECONOMIC STABILITY: FOOD INSECURITY: WITHIN THE PAST 12 MONTHS, YOU WORRIED THAT YOUR FOOD WOULD RUN OUT BEFORE YOU GOT MONEY TO BUY MORE.: NEVER TRUE

## 2021-11-18 ASSESSMENT — SOCIAL DETERMINANTS OF HEALTH (SDOH): HOW HARD IS IT FOR YOU TO PAY FOR THE VERY BASICS LIKE FOOD, HOUSING, MEDICAL CARE, AND HEATING?: NOT HARD AT ALL

## 2021-11-18 NOTE — PROGRESS NOTES
Progress Note    Raad Aguilar is a 77 y.o.  male who presents today alone for evaluation of   Chief Complaint   Patient presents with    Urinary Frequency     wakes up at least twice a night up to 6 times,  if sits down does not empty bladder at all            HPI:   Patient is here for same day visit to discuss urinary frequency. Patient states he is experiencing frequent urination at night and during the day. Patient states it is much worse at night. Patient states it can occur 5-6 times during the night. Patient states he feels he cannot empty his bladder completely. Patient admits to some blood in his urine. Patient denies foul smells to urine. Patient would like a flu shot. Patient denies f/c/n/v/d. Patient denies flank and/or abd pain. Health Maintenance Due   Topic Date Due    PSA counseling  07/02/2013    Colon cancer screen colonoscopy  08/16/2018    COVID-19 Vaccine (2 - Booster for [facility] series) 05/19/2021    Annual Wellness Visit (AWV)  Never done    Flu vaccine (1) 09/01/2021        Current Medications:     Current Outpatient Medications   Medication Sig Dispense Refill    tamsulosin (FLOMAX) 0.4 MG capsule Take 1 capsule by mouth daily 90 capsule 0    lisinopril-hydroCHLOROthiazide (PRINZIDE;ZESTORETIC) 20-25 MG per tablet take 1 tablet by mouth once daily 30 tablet 3    CPAP Machine MISC by Does not apply route For continuous use at HS. Pressures at 12    DX: eliane 1 each 0    CPAP Machine MISC by Does not apply route For continuous use at HS    DX: eliane 1 each 0     No current facility-administered medications for this visit.        Allergies:   No Known Allergies     Medical History:     Past Medical History:   Diagnosis Date    DJD (degenerative joint disease)     Erectile dysfunction     Hernia     Hypertension     Osteoarthritis     Sleep apnea     Tubular adenoma of colon        Past Surgical History:   Procedure Laterality Date    COLONOSCOPY  2013; due 2018    HERNIA REPAIR      rt. inguinal with mesh    JOINT REPLACEMENT  right knee       No family history on file. Social History:     Social History     Socioeconomic History    Marital status:      Spouse name: Not on file    Number of children: Not on file    Years of education: Not on file    Highest education level: Not on file   Occupational History    Not on file   Tobacco Use    Smoking status: Never Smoker    Smokeless tobacco: Never Used   Substance and Sexual Activity    Alcohol use: Yes     Comment: social    Drug use: No    Sexual activity: Not on file   Other Topics Concern    Not on file   Social History Narrative    Not on file     Social Determinants of Health     Financial Resource Strain: Low Risk     Difficulty of Paying Living Expenses: Not hard at all   Food Insecurity: No Food Insecurity    Worried About [address] in the Last Year: Never true    [address] in the Last Year: Never true   Transportation Needs:     Lack of Transportation (Medical): Not on file    Lack of Transportation (Non-Medical):  Not on file   Physical Activity:     Days of Exercise per Week: Not on file    Minutes of Exercise per Session: Not on file   Stress:     Feeling of Stress : Not on file   Social Connections:     Frequency of Communication with Friends and Family: Not on file    Frequency of Social Gatherings with Friends and Family: Not on file    Attends Jewish Services: Not on file    Active Member of Clubs or Organizations: Not on file    Attends Club or Organization Meetings: Not on file    Marital Status: Not on file   Intimate Partner Violence:     Fear of Current or Ex-Partner: Not on file    Emotionally Abused: Not on file    Physically Abused: Not on file    Sexually Abused: Not on file   Housing Stability:     Unable to Pay for Housing in the Last Year: Not on file    Number of [place] in the Last Year: Not on file    Unstable Housing in the Last Year: Not on file        ROS:     Constitutional: No fevers, chills, fatigue. ENT: No nasal congestion or sore throat  Respiratory: No difficulty in breathing or cough. Cardiovascular: No chest pain, palpitations or shortness of breath  Gastrointestinal: No abdominal pain or change in bowel movements. Genitourinary: +change in urinary frequency; no dysuria. +blood in urine  Skin: No rashes or skin lesions. Neurological: No weakness. No headaches. Last Filed Vitals:  /68   Pulse 77   Wt 239 lb (108.4 kg)   SpO2 95%   BMI 29.87 kg/m²      Physical Examination:     GENERAL APPEARANCE: in no acute distress, well developed, well nourished. HEAD: normocephalic, atraumatic. EYES: extraocular movement intact (EOMI), pupils equal, round, reactive to light and accommodation. EARS: normal, tympanic membrane intact, clear, auditory canal clear. NOSE: nares patent, no erythema, sinuses nontender bilaterally, no rhinorrhea. ORAL CAVITY: mucosa moist, no lesions. THROAT: clear, no mass, no exudate. NECK/THYROID: neck supple, full range of motion, no thyromegaly. HEART: no murmurs, regular rate and rhythm, S1, S2 normal.   LUNGS: clear to auscultation bilaterally, no wheezes, rales, rhonchi.    ABDOMEN: normal, bowel sounds present, soft, nontender, nondistended, no rebound guarding or rigidity  JUANCARLOS: +enlarged prostate    Recent Labs/ In Office Testing/ Radiograph review:     Hospital Outpatient Visit on 04/13/2021   Component Date Value Ref Range Status    WBC 04/13/2021 4.5  3.5 - 11.3 k/uL Final    RBC 04/13/2021 4.76  4.21 - 5.77 m/uL Final    Hemoglobin 04/13/2021 14.8  13.0 - 17.0 g/dL Final    Hematocrit 04/13/2021 43.6  40.7 - 50.3 % Final    MCV 04/13/2021 91.6  82.6 - 102.9 fL Final    MCH 04/13/2021 31.1  25.2 - 33.5 pg Final    MCHC 04/13/2021 33.9  28.4 - 34.8 g/dL Final    RDW 04/13/2021 11.9  11.8 - 14.4 % Final    Platelets 16/22/7612 205  138 - 453 k/uL Final    MPV 04/13/2021 11.7  8.1 - 13.5 fL Final    NRBC Automated 04/13/2021 0.0  0.0 per 100 WBC Final    Differential Type 04/13/2021 NOT REPORTED   Final    Seg Neutrophils 04/13/2021 70* 36 - 65 % Final    Lymphocytes 04/13/2021 19* 24 - 43 % Final    Monocytes 04/13/2021 8  3 - 12 % Final    Eosinophils % 04/13/2021 2  1 - 4 % Final    Basophils 04/13/2021 1  0 - 2 % Final    Immature Granulocytes 04/13/2021 0  0 % Final    Segs Absolute 04/13/2021 3.14  1.50 - 8.10 k/uL Final    Absolute Lymph # 04/13/2021 0.88* 1.10 - 3.70 k/uL Final    Absolute Mono # 04/13/2021 0.37  0.10 - 1.20 k/uL Final    Absolute Eos # 04/13/2021 0.07  0.00 - 0.44 k/uL Final    Basophils Absolute 04/13/2021 0.05  0.00 - 0.20 k/uL Final    Absolute Immature Granulocyte 04/13/2021 <0.03  0.00 - 0.30 k/uL Final    WBC Morphology 04/13/2021 NOT REPORTED   Final    RBC Morphology 04/13/2021 NOT REPORTED   Final    Platelet Estimate 69/23/2146 NOT REPORTED   Final    Glucose 04/13/2021 104* 70 - 99 mg/dL Final    BUN 04/13/2021 23  8 - 23 mg/dL Final    CREATININE 04/13/2021 1.20  0.70 - 1.20 mg/dL Final    Bun/Cre Ratio 04/13/2021 NOT REPORTED  9 - 20 Final    Calcium 04/13/2021 9.5  8.6 - 10.4 mg/dL Final    Sodium 04/13/2021 138  135 - 144 mmol/L Final    Potassium 04/13/2021 3.8  3.7 - 5.3 mmol/L Final    Chloride 04/13/2021 98  98 - 107 mmol/L Final    CO2 04/13/2021 31  20 - 31 mmol/L Final    Anion Gap 04/13/2021 9  9 - 17 mmol/L Final    Alkaline Phosphatase 04/13/2021 55  40 - 129 U/L Final    ALT 04/13/2021 15  5 - 41 U/L Final    AST 04/13/2021 23  <40 U/L Final    Total Bilirubin 04/13/2021 0.85  0.3 - 1.2 mg/dL Final    Total Protein 04/13/2021 7.2  6.4 - 8.3 g/dL Final    Albumin 04/13/2021 3.8  3.5 - 5.2 g/dL Final    Albumin/Globulin Ratio 04/13/2021 1.1  1.0 - 2.5 Final    GFR Non- 04/13/2021 59* >60 mL/min Final    GFR  04/13/2021 >60  >60 mL/min Final    GFR Comment 04/13/2021        Final    Comment: Average GFR for 79or more years old:   76 mL/min/1.73sq m  Chronic Kidney Disease:   <60 mL/min/1.73sq m  Kidney failure:   <15 mL/min/1.73sq m              eGFR calculated using average adult body mass. Additional eGFR calculator available at:        [email or web address]            GFR Staging 04/13/2021 NOT REPORTED   Final    Cholesterol 04/13/2021 163  <200 mg/dL Final    Comment:    Cholesterol Guidelines:      <200  Desirable   200-240  Borderline      >240  Undesirable         HDL 04/13/2021 33* >40 mg/dL Final    Comment:    HDL Guidelines:    <40     Undesirable   40-59    Borderline    >59     Desirable         LDL Cholesterol 04/13/2021 97  0 - 130 mg/dL Final    Comment:    LDL Guidelines:     <100    Desirable   100-129   Near to/above Desirable   130-159   Borderline      >159   Undesirable     Direct (measured) LDL and calculated LDL are not interchangeable tests.  Chol/HDL Ratio 04/13/2021 4.9  <5 Final            Triglycerides 04/13/2021 166* <150 mg/dL Final    Comment:    Triglyceride Guidelines:     <150   Desirable   150-199  Borderline   200-499  High     >499   Very high   Based on AHA Guidelines for fasting triglyceride, October 2012.  VLDL 04/13/2021 NOT REPORTED* 1 - 30 mg/dL Final       No results found for this visit on 11/18/21. Assessment/Plan:     Sumit Wolff was seen today for urinary frequency. Diagnoses and all orders for this visit:    Urinary frequency  -     Urinalysis with Microscopic; Future  -     Culture, Urine; Future    Hyperglycemia  -     CBC Auto Differential; Future  -     Renal Function Panel; Future  -     Magnesium; Future  -     Hemoglobin A1C; Future    Enlarged prostate  -     tamsulosin (FLOMAX) 0.4 MG capsule;  Take 1 capsule by mouth daily  -     Ernesto Augustin MD, Urology, [place]    Need for vaccination  -     INFLUENZA, QUADV, ADJUVANTED, 72 YRS =, IM, PF, PREFILL SYR, 0.5ML (FLUAD)    Follow up on labs. Start flomax. Referral to urology placed due to associated blood in urine reported as well as enlarged prostate on JUANCARLOS. Flu shot today. All questions answered and addressed to patient satisfaction. Patient understands and agrees to the plan. The patient was evaluated and treated today based on the osteopathic principle that each person is a unit of body, mind, and spirit, the body is capable of self-regulation, self-healing, and health maintenance and that structure and function are reciprocally interrelated. Follow-up:   Return if symptoms worsen or fail to improve.       Letitia Viveros D.O.

## 2021-11-19 LAB
ESTIMATED AVERAGE GLUCOSE: 100 MG/DL
HBA1C MFR BLD: 5.1 % (ref 4–6)

## 2022-03-29 ENCOUNTER — OFFICE VISIT (OUTPATIENT)
Dept: UROLOGY | Age: 77
End: 2022-03-29
Payer: MEDICARE

## 2022-03-29 VITALS
TEMPERATURE: 96.2 F | HEIGHT: 75 IN | WEIGHT: 225 LBS | BODY MASS INDEX: 27.98 KG/M2 | HEART RATE: 74 BPM | DIASTOLIC BLOOD PRESSURE: 83 MMHG | SYSTOLIC BLOOD PRESSURE: 121 MMHG

## 2022-03-29 DIAGNOSIS — N13.8 BPH WITH OBSTRUCTION/LOWER URINARY TRACT SYMPTOMS: Primary | ICD-10-CM

## 2022-03-29 DIAGNOSIS — R97.20 ELEVATED PSA: ICD-10-CM

## 2022-03-29 DIAGNOSIS — N40.1 BPH WITH OBSTRUCTION/LOWER URINARY TRACT SYMPTOMS: Primary | ICD-10-CM

## 2022-03-29 DIAGNOSIS — R35.1 NOCTURIA: ICD-10-CM

## 2022-03-29 PROCEDURE — 1123F ACP DISCUSS/DSCN MKR DOCD: CPT | Performed by: UROLOGY

## 2022-03-29 PROCEDURE — G8484 FLU IMMUNIZE NO ADMIN: HCPCS | Performed by: UROLOGY

## 2022-03-29 PROCEDURE — 99204 OFFICE O/P NEW MOD 45 MIN: CPT | Performed by: UROLOGY

## 2022-03-29 PROCEDURE — G8417 CALC BMI ABV UP PARAM F/U: HCPCS | Performed by: UROLOGY

## 2022-03-29 PROCEDURE — 1036F TOBACCO NON-USER: CPT | Performed by: UROLOGY

## 2022-03-29 PROCEDURE — G8428 CUR MEDS NOT DOCUMENT: HCPCS | Performed by: UROLOGY

## 2022-03-29 PROCEDURE — 4040F PNEUMOC VAC/ADMIN/RCVD: CPT | Performed by: UROLOGY

## 2022-03-29 PROCEDURE — 3017F COLORECTAL CA SCREEN DOC REV: CPT | Performed by: UROLOGY

## 2022-03-29 RX ORDER — ALFUZOSIN HYDROCHLORIDE 10 MG/1
10 TABLET, EXTENDED RELEASE ORAL DAILY
Qty: 30 TABLET | Refills: 3 | Status: ON HOLD | OUTPATIENT
Start: 2022-03-29 | End: 2022-07-07 | Stop reason: ALTCHOICE

## 2022-03-29 ASSESSMENT — ENCOUNTER SYMPTOMS
SHORTNESS OF BREATH: 0
DIARRHEA: 0
WHEEZING: 0
NAUSEA: 0
GASTROINTESTINAL NEGATIVE: 1
COUGH: 0
ABDOMINAL PAIN: 0
EYE PAIN: 0
CONSTIPATION: 0
RESPIRATORY NEGATIVE: 1
EYE REDNESS: 0
EYES NEGATIVE: 1
VOMITING: 0
BACK PAIN: 0

## 2022-03-29 NOTE — PROGRESS NOTES
Review of Systems   Constitutional: Negative. Negative for appetite change, chills and fatigue. Eyes: Negative. Negative for pain, redness and visual disturbance. Respiratory: Negative. Negative for cough, shortness of breath and wheezing. Cardiovascular: Negative. Negative for chest pain and leg swelling. Gastrointestinal: Negative. Negative for abdominal pain, constipation, diarrhea, nausea and vomiting. Genitourinary: Positive for frequency (only at night). Negative for difficulty urinating, dysuria, flank pain, hematuria and urgency. Musculoskeletal: Negative for back pain, joint swelling and myalgias. Skin: Negative for rash and wound. Neurological: Negative for dizziness, weakness and numbness. Hematological: Does not bruise/bleed easily.

## 2022-03-29 NOTE — LETTER
[address] [address]  Dept: [phone]  Dept Fax: [phone]        3/29/22    Patient: Merlyn Lomax  YOB: 1945    Dear Saw Wooten MD,    I had the pleasure of seeing one of your patients, Kari Senior today in the office today. Below are the relevant portions of my assessment and plan of care. IMPRESSION:  1. BPH with obstruction/lower urinary tract symptoms    2. Nocturia    3. Elevated PSA        PLAN:  Will try uroxatral, as Flomax did not help. Restrict fluids before bed. Will check PSA a swell. Prescriptions Ordered:  Orders Placed This Encounter   Medications    alfuzosin (UROXATRAL) 10 MG extended release tablet     Sig: Take 1 tablet by mouth daily     Dispense:  30 tablet     Refill:  3      Orders Placed:  Orders Placed This Encounter   Procedures    PSA, Diagnostic     Standing Status:   Future     Standing Expiration Date:   3/29/2023         Thank you for allowing me to participate in the care of this patient. I will keep you updated on this patient's follow up and I look forward to serving you and your patients again in the future.         Matt Cordero MD

## 2022-03-29 NOTE — PROGRESS NOTES
[address] [address]  Dept: [phone]  Dept Fax: [phone] [facility] Urology Office Note - New patient    Patient:  Sherryle Bijou  YOB: 1945  Date: 3/29/2022    The patient is a 77 y.o. male who presents todayfor evaluation of the following problems:   Chief Complaint   Patient presents with    New Patient     enlarged prostate    referred by Inez Aguilera MD.      HPI  He is here for BPH. He has a weak stream most of the time. He is up a few times at night. He had an elevated PSA in 2012, which was 5.58. He has not seen a urologist in the past. He was given Flomax, but he did not think it did much. He sees blood in his urine occasionally, when his stream starts, just a red spot. No dysuria. He does not restrict his fluids before bed. (Patient's old records have been requested, reviewed and summarized in today's note.)    Summary of old records: N/A    Additional History: N/A    Procedures Today: N/A    Last several PSA's:  Lab Results   Component Value Date    PSA 5.58 (H) 07/02/2012     Last total testosterone:  Lab Results   Component Value Date    TESTOSTERONE 429 07/02/2012     Urinalysis today:  No results found for this visit on 03/29/22. AUA Symptom Score (3/29/2022):   INCOMPLETE EMPTYING: How often have you had the sensation of not emptying your bladder?: More than Half the time  FREQUENCY: How often do you have to urinate less than every two hours?: Not at all  INTERMITTENCY: How often have you found you stopped and started again several times when you urinated?: About Half the time  URGENCY: How often have you found it difficult to postpone urination?: Less than Half the time  WEAK STREAM: How often have you had a weak urinary stream?: Almost always  STRAINING: How often have you had to strain to start  urination?: Not at all  NOCTURIA: How y.o. male   Vitals:    03/29/22 1103   BP: 121/83   Pulse: 74   Temp: 96.2 °F (35.7 °C)     Body mass index is 28.12 kg/m². Physical Exam  Constitutional: Patient in no acute distress. Neuro: Alert and oriented to person, place and time. Psych: Mood normal, affect normal  Skin: No rash noted  Lungs:Respiratory effort is normal  Cardiovascular: Warm & Pink  Abdomen: Soft, non-tender, non-distendedwith no CVA,  No flank tenderness,  Orhepatosplenomegaly   Lymphatics: No palpable lymphadenopathy. Bladder non-tender and not distended. Musculoskeletal: Normal gait and station  Penis normal and circumcised  Urethral meatus normal  Scrotal exam normal  Testicles normal bilaterally  Epididymis normal bilaterally  No evidence of inguinal hernia  Normal rectal tone with no masses  Prostate:  40 grams, smooth, no nodules. Assessment and Plan      1. BPH with obstruction/lower urinary tract symptoms    2. Nocturia    3. Elevated PSA           Plan: Will try uroxatral, as Flomax did not help. Restrict fluids before bed. Will check PSA a swell. Prescriptions Ordered:  Orders Placed This Encounter   Medications    alfuzosin (UROXATRAL) 10 MG extended release tablet     Sig: Take 1 tablet by mouth daily     Dispense:  30 tablet     Refill:  3      Orders Placed:  Orders Placed This Encounter   Procedures    PSA, Diagnostic     Standing Status:   Future     Standing Expiration Date:   3/29/2023             Anjali Vazquez MD    Agree with the ROS entered by the MA.

## 2022-04-20 ENCOUNTER — HOSPITAL ENCOUNTER (OUTPATIENT)
Age: 77
Setting detail: SPECIMEN
Discharge: HOME | End: 2022-04-20

## 2022-04-20 DIAGNOSIS — R97.20 ELEVATED PSA: ICD-10-CM

## 2022-04-20 LAB — PROSTATE SPECIFIC ANTIGEN: 9.92 UG/L

## 2022-04-28 RX ORDER — LISINOPRIL AND HYDROCHLOROTHIAZIDE 25; 20 MG/1; MG/1
TABLET ORAL
Qty: 30 TABLET | Refills: 3 | Status: SHIPPED | OUTPATIENT
Start: 2022-04-28 | End: 2022-05-09

## 2022-04-28 NOTE — TELEPHONE ENCOUNTER
Amee Granger is calling to request a refill on the following medication(s):    Medication Request:  Requested Prescriptions     Pending Prescriptions Disp Refills    lisinopril-hydroCHLOROthiazide (PRINZIDE;ZESTORETIC) 20-25 MG per tablet [Pharmacy Med Name: LISINOPRIL-HCTZ 20-25 MG TAB] 30 tablet 3     Sig: take 1 tablet by mouth once daily       Last Visit Date (If Applicable):  Visit date not found    Next Visit Date:    Visit date not found

## 2022-05-09 ENCOUNTER — OFFICE VISIT (OUTPATIENT)
Dept: FAMILY MEDICINE CLINIC | Age: 77
End: 2022-05-09
Payer: MEDICARE

## 2022-05-09 VITALS
DIASTOLIC BLOOD PRESSURE: 64 MMHG | HEIGHT: 75 IN | TEMPERATURE: 97.5 F | BODY MASS INDEX: 29.84 KG/M2 | OXYGEN SATURATION: 94 % | WEIGHT: 240 LBS | HEART RATE: 61 BPM | SYSTOLIC BLOOD PRESSURE: 120 MMHG

## 2022-05-09 DIAGNOSIS — M62.830 SPASM OF BACK MUSCLES: ICD-10-CM

## 2022-05-09 DIAGNOSIS — Z12.11 SCREENING FOR MALIGNANT NEOPLASM OF COLON: ICD-10-CM

## 2022-05-09 DIAGNOSIS — I10 ESSENTIAL HYPERTENSION: Primary | Chronic | ICD-10-CM

## 2022-05-09 DIAGNOSIS — Z76.89 ENCOUNTER TO ESTABLISH CARE WITH NEW DOCTOR: ICD-10-CM

## 2022-05-09 DIAGNOSIS — G47.30 SLEEP APNEA, UNSPECIFIED TYPE: Chronic | ICD-10-CM

## 2022-05-09 DIAGNOSIS — N40.1 BENIGN PROSTATIC HYPERPLASIA WITH LOWER URINARY TRACT SYMPTOMS, SYMPTOM DETAILS UNSPECIFIED: Chronic | ICD-10-CM

## 2022-05-09 PROCEDURE — G8417 CALC BMI ABV UP PARAM F/U: HCPCS | Performed by: NURSE PRACTITIONER

## 2022-05-09 PROCEDURE — G8427 DOCREV CUR MEDS BY ELIG CLIN: HCPCS | Performed by: NURSE PRACTITIONER

## 2022-05-09 PROCEDURE — 99214 OFFICE O/P EST MOD 30 MIN: CPT | Performed by: NURSE PRACTITIONER

## 2022-05-09 PROCEDURE — 4040F PNEUMOC VAC/ADMIN/RCVD: CPT | Performed by: NURSE PRACTITIONER

## 2022-05-09 PROCEDURE — 1036F TOBACCO NON-USER: CPT | Performed by: NURSE PRACTITIONER

## 2022-05-09 PROCEDURE — 1123F ACP DISCUSS/DSCN MKR DOCD: CPT | Performed by: NURSE PRACTITIONER

## 2022-05-09 PROCEDURE — 3017F COLORECTAL CA SCREEN DOC REV: CPT | Performed by: NURSE PRACTITIONER

## 2022-05-09 RX ORDER — LISINOPRIL AND HYDROCHLOROTHIAZIDE 25; 20 MG/1; MG/1
TABLET ORAL
Qty: 30 TABLET | Refills: 3
Start: 2022-05-09 | End: 2022-07-22 | Stop reason: SDUPTHER

## 2022-05-09 ASSESSMENT — PATIENT HEALTH QUESTIONNAIRE - PHQ9
SUM OF ALL RESPONSES TO PHQ9 QUESTIONS 1 & 2: 0
SUM OF ALL RESPONSES TO PHQ QUESTIONS 1-9: 0
SUM OF ALL RESPONSES TO PHQ QUESTIONS 1-9: 0
1. LITTLE INTEREST OR PLEASURE IN DOING THINGS: 0
2. FEELING DOWN, DEPRESSED OR HOPELESS: 0
SUM OF ALL RESPONSES TO PHQ QUESTIONS 1-9: 0
SUM OF ALL RESPONSES TO PHQ QUESTIONS 1-9: 0
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
## 2022-05-20 ENCOUNTER — TELEPHONE (OUTPATIENT)
Dept: GASTROENTEROLOGY | Age: 77
End: 2022-05-20

## 2022-05-20 DIAGNOSIS — Z86.010 HISTORY OF COLON POLYPS: Primary | ICD-10-CM

## 2022-05-20 RX ORDER — POLYETHYLENE GLYCOL 3350 17 G/17G
POWDER, FOR SOLUTION ORAL
Qty: 238 G | Refills: 0 | Status: ON HOLD | OUTPATIENT
Start: 2022-05-20 | End: 2022-07-25

## 2022-05-20 RX ORDER — BISACODYL 5 MG
TABLET, DELAYED RELEASE (ENTERIC COATED) ORAL
Qty: 2 TABLET | Refills: 0 | Status: ON HOLD | OUTPATIENT
Start: 2022-05-20 | End: 2022-07-25

## 2022-05-20 NOTE — TELEPHONE ENCOUNTER
Called pt regarding referral; He is now scheduled for ST[facility] Dr Kristan Fowler 7/7/22 at 1130am, colon recall, em miralax. Reviewed bowel prep instructions with patient over phone and mailed to home address.

## 2022-07-05 NOTE — TELEPHONE ENCOUNTER
Patient called office to ask if his prescriptions were called in or if he needed to get them OTC. Pt is advised of date scripts were sent. He sates he will call the pharmacy and let us know if they need resent.

## 2022-07-06 ENCOUNTER — TELEPHONE (OUTPATIENT)
Dept: GASTROENTEROLOGY | Age: 77
End: 2022-07-06

## 2022-07-06 NOTE — TELEPHONE ENCOUNTER
Called pt and LVM informing him that his procedure time on 7/7/22 at [facility] changed to 1045am and he needs to arrive at 915am

## 2022-07-07 ENCOUNTER — ANESTHESIA EVENT (OUTPATIENT)
Dept: OPERATING ROOM | Age: 77
End: 2022-07-07
Payer: MEDICARE

## 2022-07-07 ENCOUNTER — HOSPITAL ENCOUNTER (OUTPATIENT)
Age: 77
Setting detail: OUTPATIENT SURGERY
Discharge: HOME | End: 2022-07-07
Attending: INTERNAL MEDICINE | Admitting: INTERNAL MEDICINE
Payer: MEDICARE

## 2022-07-07 ENCOUNTER — ANESTHESIA (OUTPATIENT)
Dept: OPERATING ROOM | Age: 77
End: 2022-07-07
Payer: MEDICARE

## 2022-07-07 VITALS
HEIGHT: 75 IN | BODY MASS INDEX: 27.86 KG/M2 | DIASTOLIC BLOOD PRESSURE: 76 MMHG | TEMPERATURE: 96.8 F | SYSTOLIC BLOOD PRESSURE: 150 MMHG | WEIGHT: 224.1 LBS | OXYGEN SATURATION: 97 % | RESPIRATION RATE: 12 BRPM | HEART RATE: 54 BPM

## 2022-07-07 DIAGNOSIS — Z86.010 HX OF COLONIC POLYPS: ICD-10-CM

## 2022-07-07 PROCEDURE — 7100000011 HC PHASE II RECOVERY - ADDTL 15 MIN: Performed by: INTERNAL MEDICINE

## 2022-07-07 PROCEDURE — 88305 TISSUE EXAM BY PATHOLOGIST: CPT

## 2022-07-07 PROCEDURE — 2500000003 HC RX 250 WO HCPCS: Performed by: NURSE ANESTHETIST, CERTIFIED REGISTERED

## 2022-07-07 PROCEDURE — 3700000001 HC ADD 15 MINUTES (ANESTHESIA): Performed by: INTERNAL MEDICINE

## 2022-07-07 PROCEDURE — 2580000003 HC RX 258: Performed by: ANESTHESIOLOGY

## 2022-07-07 PROCEDURE — 2709999900 HC NON-CHARGEABLE SUPPLY: Performed by: INTERNAL MEDICINE

## 2022-07-07 PROCEDURE — 7100000010 HC PHASE II RECOVERY - FIRST 15 MIN: Performed by: INTERNAL MEDICINE

## 2022-07-07 PROCEDURE — 3700000000 HC ANESTHESIA ATTENDED CARE: Performed by: INTERNAL MEDICINE

## 2022-07-07 PROCEDURE — 45380 COLONOSCOPY AND BIOPSY: CPT | Performed by: INTERNAL MEDICINE

## 2022-07-07 PROCEDURE — 6360000002 HC RX W HCPCS: Performed by: NURSE ANESTHETIST, CERTIFIED REGISTERED

## 2022-07-07 PROCEDURE — 3609027000 HC COLONOSCOPY: Performed by: INTERNAL MEDICINE

## 2022-07-07 RX ORDER — SODIUM CHLORIDE 0.9 % (FLUSH) 0.9 %
5-40 SYRINGE (ML) INJECTION EVERY 12 HOURS SCHEDULED
Status: DISCONTINUED | OUTPATIENT
Start: 2022-07-07 | End: 2022-07-07 | Stop reason: HOSPADM

## 2022-07-07 RX ORDER — SODIUM CHLORIDE, SODIUM LACTATE, POTASSIUM CHLORIDE, CALCIUM CHLORIDE 600; 310; 30; 20 MG/100ML; MG/100ML; MG/100ML; MG/100ML
INJECTION, SOLUTION INTRAVENOUS CONTINUOUS
Status: DISCONTINUED | OUTPATIENT
Start: 2022-07-08 | End: 2022-07-07 | Stop reason: HOSPADM

## 2022-07-07 RX ORDER — LIDOCAINE HYDROCHLORIDE 10 MG/ML
1 INJECTION, SOLUTION EPIDURAL; INFILTRATION; INTRACAUDAL; PERINEURAL
Status: DISCONTINUED | OUTPATIENT
Start: 2022-07-08 | End: 2022-07-07 | Stop reason: HOSPADM

## 2022-07-07 RX ORDER — LIDOCAINE HYDROCHLORIDE 20 MG/ML
INJECTION, SOLUTION INFILTRATION; PERINEURAL PRN
Status: DISCONTINUED | OUTPATIENT
Start: 2022-07-07 | End: 2022-07-07 | Stop reason: SDUPTHER

## 2022-07-07 RX ORDER — SODIUM CHLORIDE 0.9 % (FLUSH) 0.9 %
5-40 SYRINGE (ML) INJECTION PRN
Status: DISCONTINUED | OUTPATIENT
Start: 2022-07-07 | End: 2022-07-07 | Stop reason: HOSPADM

## 2022-07-07 RX ORDER — SODIUM CHLORIDE 9 MG/ML
INJECTION, SOLUTION INTRAVENOUS PRN
Status: DISCONTINUED | OUTPATIENT
Start: 2022-07-07 | End: 2022-07-07 | Stop reason: HOSPADM

## 2022-07-07 RX ORDER — PROPOFOL 10 MG/ML
INJECTION, EMULSION INTRAVENOUS PRN
Status: DISCONTINUED | OUTPATIENT
Start: 2022-07-07 | End: 2022-07-07 | Stop reason: SDUPTHER

## 2022-07-07 RX ORDER — M-VIT,TX,IRON,MINS/CALC/FOLIC 27MG-0.4MG
1 TABLET ORAL DAILY
COMMUNITY

## 2022-07-07 RX ADMIN — PROPOFOL 50 MG: 10 INJECTION, EMULSION INTRAVENOUS at 10:13

## 2022-07-07 RX ADMIN — PROPOFOL 50 MG: 10 INJECTION, EMULSION INTRAVENOUS at 10:28

## 2022-07-07 RX ADMIN — LIDOCAINE HYDROCHLORIDE 40 MG: 20 INJECTION, SOLUTION INFILTRATION; PERINEURAL at 10:04

## 2022-07-07 RX ADMIN — SODIUM CHLORIDE, POTASSIUM CHLORIDE, SODIUM LACTATE AND CALCIUM CHLORIDE: 600; 310; 30; 20 INJECTION, SOLUTION INTRAVENOUS at 09:27

## 2022-07-07 RX ADMIN — PROPOFOL 50 MG: 10 INJECTION, EMULSION INTRAVENOUS at 10:20

## 2022-07-07 RX ADMIN — PROPOFOL 50 MG: 10 INJECTION, EMULSION INTRAVENOUS at 10:04

## 2022-07-07 RX ADMIN — PROPOFOL 50 MG: 10 INJECTION, EMULSION INTRAVENOUS at 10:08

## 2022-07-07 ASSESSMENT — PAIN - FUNCTIONAL ASSESSMENT: PAIN_FUNCTIONAL_ASSESSMENT: 0-10

## 2022-07-07 NOTE — ANESTHESIA PRE PROCEDURE
Patient Active Problem List   Diagnosis Code    Erectile dysfunction N52.9    Sleep apnea G47.30    Osteoarthritis M19.90    Hernia K46.9    BPH (benign prostatic hyperplasia) N40.0    Essential hypertension I10    Tubular adenoma D36.9       Past Medical History:        Diagnosis Date    DJD (degenerative joint disease)     Erectile dysfunction     Hernia     Hypertension     Osteoarthritis     Sleep apnea     Tubular adenoma of colon        Past Surgical History:        Procedure Laterality Date    COLONOSCOPY  2013; due 2018   [address]      rt. inguinal with mesh    JOINT REPLACEMENT  right knee       Social History:    Social History     Tobacco Use    Smoking status: Never Smoker    Smokeless tobacco: Never Used   Substance Use Topics    Alcohol use: Yes     Comment: social                                Counseling given: Not Answered      Vital Signs (Current):   Vitals:    07/07/22 0859 07/07/22 0904   BP: 126/78    Pulse: 66    Resp: 18    Temp:  97.1 °F (36.2 °C)   SpO2: 95%    Weight:  224 lb 1.6 oz (101.7 kg)   Height:  6' 3\" (1.905 m)                                              BP Readings from Last 3 Encounters:   07/07/22 126/78   05/09/22 120/64   03/29/22 121/83       NPO Status: Time of last liquid consumption: 0500 (clear liquid)                        Time of last solid consumption: 1930                        Date of last liquid consumption: 07/07/22                        Date of last solid food consumption: 07/05/22    BMI:   Wt Readings from Last 3 Encounters:   07/07/22 224 lb 1.6 oz (101.7 kg)   05/09/22 240 lb (108.9 kg)   03/29/22 225 lb (102.1 kg)     Body mass index is 28.01 kg/m².     CBC:   Lab Results   Component Value Date/Time    WBC 6.6 11/18/2021 03:55 PM    RBC 5.05 11/18/2021 03:55 PM    HGB 15.8 11/18/2021 03:55 PM    HCT 47.4 11/18/2021 03:55 PM    MCV 93.9 11/18/2021 03:55 PM    RDW 13.0 11/18/2021 03:55 PM     11/18/2021 03:55 PM       CMP: Lab Results   Component Value Date/Time     11/18/2021 03:55 PM    K 3.7 11/18/2021 03:55 PM     11/18/2021 03:55 PM    CO2 25 11/18/2021 03:55 PM    BUN 23 11/18/2021 03:55 PM    CREATININE 1.29 11/18/2021 03:55 PM    GFRAA >60 11/18/2021 03:55 PM    LABGLOM 54 11/18/2021 03:55 PM    GLUCOSE 99 11/18/2021 03:55 PM    PROT 7.2 04/13/2021 09:24 AM    CALCIUM 9.7 11/18/2021 03:55 PM    BILITOT 0.85 04/13/2021 09:24 AM    ALKPHOS 55 04/13/2021 09:24 AM    AST 23 04/13/2021 09:24 AM    ALT 15 04/13/2021 09:24 AM       POC Tests: No results for input(s): POCGLU, POCNA, POCK, POCCL, POCBUN, POCHEMO, POCHCT in the last 72 hours. Coags: No results found for: PROTIME, INR, APTT    HCG (If Applicable): No results found for: PREGTESTUR, PREGSERUM, HCG, HCGQUANT     ABGs: No results found for: PHART, PO2ART, ICN7WWR, WII4GRG, BEART, A8GDYSIR     Type & Screen (If Applicable):  No results found for: LABABO, LABRH    Drug/Infectious Status (If Applicable):  Lab Results   Component Value Date/Time    HEPCAB NONREACTIVE 08/06/2018 08:22 AM       COVID-19 Screening (If Applicable): No results found for: COVID19        Anesthesia Evaluation    Airway: Mallampati: I  TM distance: >3 FB   Neck ROM: full  Mouth opening: > = 3 FB   Dental:          Pulmonary:   (+) sleep apnea:                             Cardiovascular:    (+) hypertension:,     (-)  angina                Neuro/Psych:               GI/Hepatic/Renal:             Endo/Other:                     Abdominal:             Vascular:           Other Findings:           Anesthesia Plan      MAC     ASA 3                                   Oscar Oh MD   7/7/2022

## 2022-07-07 NOTE — ANESTHESIA POSTPROCEDURE EVALUATION
Department of Anesthesiology  Postprocedure Note    Patient: Andi Aaron  MRN: [id number]  YOB: 1945  Date of evaluation: 7/7/2022      Procedure Summary     Date: 07/07/22 Room / Location: [place] [address] / [facility] - INPATIENT    Anesthesia Start: 1001 Anesthesia Stop: 9644    Procedure: COLONOSCOPY DIAGNOSTIC (N/A Rectum) Diagnosis:       Hx of colonic polyps      (DX HX OF COLON POLYPS)    Surgeons: Roverto Torres MD Responsible Provider: Linda Gonsales MD    Anesthesia Type: MAC ASA Status: 3          Anesthesia Type: No value filed.     Fran Phase I: Fran Score: 8    Fran Phase II: Fran Score: 8      Anesthesia Post Evaluation    Complications: no

## 2022-07-07 NOTE — H&P
History and Physical Service   [place] [address]    HISTORY AND PHYSICAL EXAMINATION            Date of Evaluation: 7/7/2022  Patient name:  Yovany Phillip  MRN:   [id number]  YOB: 1945  PCP:    ROBB Coyel CNP    History Obtained From:     Patient    History of Present Illness: This is Yovany Phillip a 77 y.o. male who presents today for a COLONOSCOPY DIAGNOSTIC by Dr. Lolita Diggs MD for HX OF COLON POLYPS. The patient completed the bowel prep as directed and now has watery clear stool. No family history of colon cancer. Previous colonoscopy was in 2013. History of tubular adenoma of colon. Pt denies black tarry stools, diarrhea, constipation, nausea, vomiting, fever, chills, night sweats, bloating, abdominal pain, and unexplained weight loss. Pt denies history of ulcers, hiatal hernia, acid reflux/GERD, IBS, and diabetes. Multiple vitamins-minerals tablet was last taken on 7/5/22. Past Medical History:     Past Medical History:   Diagnosis Date    DJD (degenerative joint disease)     Erectile dysfunction     Hernia     Hypertension     Osteoarthritis     Sleep apnea     Tubular adenoma of colon         Past Surgical History:     Past Surgical History:   Procedure Laterality Date    COLONOSCOPY  2013; due 2018    HERNIA REPAIR      rt. inguinal with mesh    JOINT REPLACEMENT  right knee        Medications Prior to Admission:     Prior to Admission medications    Medication Sig Start Date End Date Taking? Authorizing Provider   Multiple Vitamins-Minerals (THERAPEUTIC MULTIVITAMIN-MINERALS) tablet Take 1 tablet by mouth daily   Yes Historical Provider, MD   polyethylene glycol (GLYCOLAX) 17 GM/SCOOP powder Follow instructions provided to you from physician's office. 5/20/22   Lolita Diggs MD   bisacodyl (BISACODYL) 5 MG EC tablet Follow instructions provided given by the physician's office.  5/20/22   Lolita Diggs MD lisinopril-hydroCHLOROthiazide (PRINZIDE;ZESTORETIC) 20-25 MG per tablet take 1/2 tablet by mouth once daily 5/9/22   ROBB Najera - CNP   CPAP Machine MISC by Does not apply route For nightly use. DX: G47.33 11/22/21   Anjali Haskins MD   Respiratory Therapy Supplies [facility] [place], ; to be used nightly. Change every 6 months. DX: G47.33 11/22/21 5/24/22  Anjali Haskins MD        Allergies:     Patient has no known allergies. Social History:     Tobacco:    reports that he has never smoked. He has never used smokeless tobacco.  Alcohol:      reports current alcohol use. Drug Use:  reports no history of drug use. Family History:     No family history on file. Review of Systems:     Positive and Negative as described in HPI. CONSTITUTIONAL: Negative for fevers, chills, sweats, fatigue, and weight loss. HEENT: Pt wears glasses. Negative for hearing changes, rhinorrhea, and throat pain  RESPIRATORY: Sleep apnea-Pt uses a CPAP. Negative for shortness of breath, cough, congestion, and wheezing. CARDIOVASCULAR: Negative for chest pain, blood clot, irregular heart beat, and palpitations. GASTROINTESTINAL: See HPI. GENITOURINARY: Negative for difficulty of urination, burning with urination, and frequency. INTEGUMENT: Negative for rash, skin lesions, and easy bruising. HEMATOLOGIC/LYMPHATIC: Negative for swelling/edema. ALLERGIC/IMMUNOLOGIC: Negative for urticaria and itching. ENDOCRINE: Negative for increase in drinking, increase in urination, heat or cold intolerance. MUSCULOSKELETAL: Negative joint pains, muscle aches, and swelling of joints. NEUROLOGICAL: Negative for headaches, dizziness, lightheadedness, numbness, and tingling extremities. BEHAVIOR/PSYCH: Negative for depression and anxiety.     Physical Exam:   /78   Pulse 66   Temp 97.1 °F (36.2 °C)   Resp 18   Ht 6' 3\" (1.905 m)   Wt 224 lb 1.6 oz (101.7 kg)   SpO2 95%   BMI 28.01 kg/m²     No results for input(s): POCGLU in the last 72 hours. General Appearance:  Alert, well appearing, and in no acute distress. Mental status: Oriented to person, place, and time. Head: Normocephalic and atraumatic. Eye: No icterus, redness, pupils equal and reactive, extraocular eye movements intact, and conjunctiva clear. Ear: Hearing grossly intact. Nose: No drainage noted. Mouth: Mucous membranes moist.  Neck: Supple and no carotid bruits noted. Lungs: Bilateral equal air entry, clear to auscultation, no wheezing, rales or rhonchi, and normal effort. Cardiovascular: Distant heart tones. Normal rate, regular rhythm, no murmur, gallop, and rub. Abdomen: Soft, nontender, nondistended, and active bowel sounds. Neurologic: Normal speech and cranial nerves II through XII grossly intact. Skin: No gross lesions, rashes, bruising, or bleeding on exposed skin area. Extremities: Posterior tibial pulses 2+ bilaterally. No pedal edema. No calf tenderness with palpation. Psych: Normal affect. Investigations:      Laboratory Testing:  No results found for this or any previous visit (from the past 24 hour(s)). No results for input(s): HGB, HCT, WBC, MCV, PLATELET, NA, K, CL, CO2, BUN, CREATININE, GLUCOSE, INR, PROTIME, APTT, AST, ALT, LABALBU, HCG in the last 720 hours. No results for input(s): COVID19 in the last 720 hours. Diagnosis:      1.  HX OF COLON POLYPS     Plans:     1. COLONOSCOPY DIAGNOSTIC       ROBB Otto CNP  7/7/2022  9:10 AM

## 2022-07-07 NOTE — OP NOTE
COLONOSCOPY    DATE OF PROCEDURE: 7/7/2022    SURGEON: Annmarie Foote MD    ASSISTANT: None    PREOPERATIVE DIAGNOSIS: Polyp surveillance colonoscopy. Last colonoscopy was in 2013 and at that time he was found to have tubular adenomas. POSTOPERATIVE DIAGNOSIS: Diverticulosis, hemorrhoids, small polyp right colon and sigmoid colon    OPERATION: Total colonoscopy and excision of polyp with biopsy. ANESTHESIA: MAC    ESTIMATED BLOOD LOSS: None    COMPLICATIONS: None     SPECIMENS:  Was Obtained: Polyp ascending colon, polyp sigmoid colon    HISTORY: The patient is a 77y.o. year old male with history of above preop diagnosis. I recommended colonoscopy with possible biopsy or polypectomy and I explained the risk, benefits, expected outcome, and alternatives to the procedure. Risks included but are not limited to bleeding, infection, respiratory distress, hypotension, and perforation of the colon and possibility of missing a lesion. The patient understands and is in agreement. PROCEDURE:  The patient's SPO2 remained above 90% throughout the procedure. Digital rectal exam was normal.  The colonoscope was inserted through the anus into the rectum and advanced under direct vision to the cecum without difficulty. Terminal ileum was examined for approximately 2 inches. The prep was good. Findings:  Terminal ileum: normal    Cecum/Ascending colon: abnormal: In the lower part of the right colon there was a polyp which is about 2 to 3 mm, excised with biopsy forceps. Had diverticulosis    Transverse colon: normal    Descending/Sigmoid colon: abnormal: Has diverticulosis. In the mid sigmoid colon a polyp which is about 3 to 4 mm, excised with biopsy forceps    Rectum/Anus: examined in normal and retroflexed positions and was normal, has hemorrhoids    Withdrawal Time was (minutes): 12          The colon was decompressed and the scope was removed.   The patient tolerated the procedure without unusual events. During the procedure, the patient's blood pressure, pulse and oxygen saturation remained stable and documented. No unusual events occurred during the procedure. Patient was transferred to recovery room and will be discharged when criteria is met. Recommendations/Plan:   1. F/U Biopsies  2. F/U In Office as instructed  3. Discussed with the family  4. High fiber diet   5. Precautions to avoid constipation     Next colonoscopy: 5 years.   If Colonoscopy is less than 10 years the recommended reason is due:polyps    Electronically signed by Sia Longoria MD  on 7/7/2022 at 10:35 AM

## 2022-07-11 ENCOUNTER — HOSPITAL ENCOUNTER (OUTPATIENT)
Age: 77
Setting detail: SPECIMEN
Discharge: HOME | End: 2022-07-11

## 2022-07-11 DIAGNOSIS — M62.830 SPASM OF BACK MUSCLES: ICD-10-CM

## 2022-07-11 DIAGNOSIS — I10 ESSENTIAL HYPERTENSION: Chronic | ICD-10-CM

## 2022-07-11 DIAGNOSIS — G47.30 SLEEP APNEA, UNSPECIFIED TYPE: Chronic | ICD-10-CM

## 2022-07-11 LAB
ABSOLUTE EOS #: 0.13 K/UL (ref 0–0.44)
ABSOLUTE IMMATURE GRANULOCYTE: <0.03 K/UL (ref 0–0.3)
ABSOLUTE LYMPH #: 1.23 K/UL (ref 1.1–3.7)
ABSOLUTE MONO #: 0.59 K/UL (ref 0.1–1.2)
ALBUMIN SERPL-MCNC: 4.6 G/DL (ref 3.5–5.2)
ALBUMIN/GLOBULIN RATIO: 2 (ref 1–2.5)
ALP BLD-CCNC: 65 U/L (ref 40–129)
ALT SERPL-CCNC: 18 U/L (ref 5–41)
ANION GAP SERPL CALCULATED.3IONS-SCNC: 11 MMOL/L (ref 9–17)
AST SERPL-CCNC: 21 U/L
BASOPHILS # BLD: 1 % (ref 0–2)
BASOPHILS ABSOLUTE: 0.06 K/UL (ref 0–0.2)
BILIRUB SERPL-MCNC: 0.95 MG/DL (ref 0.3–1.2)
BUN BLDV-MCNC: 26 MG/DL (ref 8–23)
CALCIUM SERPL-MCNC: 10.1 MG/DL (ref 8.6–10.4)
CHLORIDE BLD-SCNC: 103 MMOL/L (ref 98–107)
CHOLESTEROL, FASTING: 179 MG/DL
CHOLESTEROL/HDL RATIO: 4.7
CO2: 26 MMOL/L (ref 20–31)
CREAT SERPL-MCNC: 1.4 MG/DL (ref 0.7–1.2)
EOSINOPHILS RELATIVE PERCENT: 2 % (ref 1–4)
GFR AFRICAN AMERICAN: 60 ML/MIN
GFR NON-AFRICAN AMERICAN: 49 ML/MIN
GFR SERPL CREATININE-BSD FRML MDRD: ABNORMAL ML/MIN/{1.73_M2}
GLUCOSE BLD-MCNC: 91 MG/DL (ref 70–99)
HCT VFR BLD CALC: 47.1 % (ref 40.7–50.3)
HDLC SERPL-MCNC: 38 MG/DL
HEMOGLOBIN: 15.8 G/DL (ref 13–17)
IMMATURE GRANULOCYTES: 0 %
LDL CHOLESTEROL: 112 MG/DL (ref 0–130)
LYMPHOCYTES # BLD: 22 % (ref 24–43)
MCH RBC QN AUTO: 31.4 PG (ref 25.2–33.5)
MCHC RBC AUTO-ENTMCNC: 33.5 G/DL (ref 28.4–34.8)
MCV RBC AUTO: 93.6 FL (ref 82.6–102.9)
MONOCYTES # BLD: 11 % (ref 3–12)
NRBC AUTOMATED: 0 PER 100 WBC
PDW BLD-RTO: 12.8 % (ref 11.8–14.4)
PLATELET # BLD: 137 K/UL (ref 138–453)
PMV BLD AUTO: 12.2 FL (ref 8.1–13.5)
POTASSIUM SERPL-SCNC: 4 MMOL/L (ref 3.7–5.3)
RBC # BLD: 5.03 M/UL (ref 4.21–5.77)
SEG NEUTROPHILS: 64 % (ref 36–65)
SEGMENTED NEUTROPHILS ABSOLUTE COUNT: 3.54 K/UL (ref 1.5–8.1)
SODIUM BLD-SCNC: 140 MMOL/L (ref 135–144)
SURGICAL PATHOLOGY REPORT: NORMAL
TOTAL PROTEIN: 6.9 G/DL (ref 6.4–8.3)
TRIGLYCERIDE, FASTING: 145 MG/DL
TSH SERPL DL<=0.05 MIU/L-ACNC: 1.75 UIU/ML (ref 0.3–5)
WBC # BLD: 5.6 K/UL (ref 3.5–11.3)

## 2022-07-22 DIAGNOSIS — I10 ESSENTIAL HYPERTENSION: Chronic | ICD-10-CM

## 2022-07-22 RX ORDER — LISINOPRIL AND HYDROCHLOROTHIAZIDE 25; 20 MG/1; MG/1
TABLET ORAL
Qty: 90 TABLET | Refills: 1 | Status: SHIPPED | OUTPATIENT
Start: 2022-07-22

## 2022-07-22 NOTE — TELEPHONE ENCOUNTER
Tyrel Payne is calling to request a refill on the following medication(s):    Medication Request:  Requested Prescriptions     Pending Prescriptions Disp Refills    lisinopril-hydroCHLOROthiazide (PRINZIDE;ZESTORETIC) 20-25 MG per tablet 90 tablet 1     Sig: take 1/2 tablet by mouth once daily       Last Visit Date (If Applicable):  5/9/8464    Next Visit Date:    11/3/2022          Patient is requesting 90 day supply.

## 2022-07-23 PROCEDURE — 99285 EMERGENCY DEPT VISIT HI MDM: CPT

## 2022-07-23 ASSESSMENT — PAIN SCALES - GENERAL: PAINLEVEL_OUTOF10: 9

## 2022-07-23 ASSESSMENT — PAIN - FUNCTIONAL ASSESSMENT: PAIN_FUNCTIONAL_ASSESSMENT: 0-10

## 2022-07-24 ENCOUNTER — APPOINTMENT (OUTPATIENT)
Dept: CT IMAGING | Age: 77
DRG: 336 | End: 2022-07-24
Payer: MEDICARE

## 2022-07-24 ENCOUNTER — APPOINTMENT (OUTPATIENT)
Dept: GENERAL RADIOLOGY | Age: 77
DRG: 336 | End: 2022-07-24
Payer: MEDICARE

## 2022-07-24 ENCOUNTER — HOSPITAL ENCOUNTER (INPATIENT)
Age: 77
LOS: 4 days | Discharge: HOME | DRG: 336 | End: 2022-07-28
Attending: STUDENT IN AN ORGANIZED HEALTH CARE EDUCATION/TRAINING PROGRAM | Admitting: INTERNAL MEDICINE
Payer: MEDICARE

## 2022-07-24 ENCOUNTER — ANESTHESIA EVENT (OUTPATIENT)
Dept: OPERATING ROOM | Age: 77
DRG: 336 | End: 2022-07-24
Payer: MEDICARE

## 2022-07-24 ENCOUNTER — ANESTHESIA (OUTPATIENT)
Dept: OPERATING ROOM | Age: 77
DRG: 336 | End: 2022-07-24
Payer: MEDICARE

## 2022-07-24 DIAGNOSIS — K56.609 SBO (SMALL BOWEL OBSTRUCTION) (HCC): Primary | ICD-10-CM

## 2022-07-24 PROBLEM — N18.9 CKD (CHRONIC KIDNEY DISEASE): Status: ACTIVE | Noted: 2022-07-24

## 2022-07-24 LAB
-: ABNORMAL
ABSOLUTE EOS #: <0.03 K/UL (ref 0–0.44)
ABSOLUTE IMMATURE GRANULOCYTE: 0.04 K/UL (ref 0–0.3)
ABSOLUTE LYMPH #: 0.75 K/UL (ref 1.1–3.7)
ABSOLUTE MONO #: 0.38 K/UL (ref 0.1–1.2)
ANION GAP SERPL CALCULATED.3IONS-SCNC: 10 MMOL/L (ref 9–17)
BACTERIA: ABNORMAL
BASOPHILS # BLD: 0 % (ref 0–2)
BASOPHILS ABSOLUTE: 0.04 K/UL (ref 0–0.2)
BILIRUBIN URINE: NEGATIVE
BUN BLDV-MCNC: 22 MG/DL (ref 8–23)
BUN/CREAT BLD: 16 (ref 9–20)
CALCIUM SERPL-MCNC: 9.7 MG/DL (ref 8.6–10.4)
CASTS UA: ABNORMAL /LPF
CASTS UA: ABNORMAL /LPF
CHLORIDE BLD-SCNC: 100 MMOL/L (ref 98–107)
CO2: 28 MMOL/L (ref 20–31)
COLOR: YELLOW
CREAT SERPL-MCNC: 1.38 MG/DL (ref 0.7–1.2)
EOSINOPHILS RELATIVE PERCENT: 0 % (ref 1–4)
EPITHELIAL CELLS UA: ABNORMAL /HPF (ref 0–5)
GFR AFRICAN AMERICAN: >60 ML/MIN
GFR NON-AFRICAN AMERICAN: 50 ML/MIN
GFR SERPL CREATININE-BSD FRML MDRD: ABNORMAL ML/MIN/{1.73_M2}
GLUCOSE BLD-MCNC: 142 MG/DL (ref 70–99)
GLUCOSE URINE: NEGATIVE
HCT VFR BLD CALC: 49.3 % (ref 40.7–50.3)
HEMOGLOBIN: 16.8 G/DL (ref 13–17)
IMMATURE GRANULOCYTES: 0 %
KETONES, URINE: ABNORMAL
LACTIC ACID, SEPSIS: 1.2 MMOL/L (ref 0.5–1.9)
LEUKOCYTE ESTERASE, URINE: NEGATIVE
LIPASE: 34 U/L (ref 13–60)
LYMPHOCYTES # BLD: 7 % (ref 24–43)
MCH RBC QN AUTO: 31.2 PG (ref 25.2–33.5)
MCHC RBC AUTO-ENTMCNC: 34.1 G/DL (ref 28.4–34.8)
MCV RBC AUTO: 91.5 FL (ref 82.6–102.9)
MONOCYTES # BLD: 4 % (ref 3–12)
MUCUS: ABNORMAL
NITRITE, URINE: NEGATIVE
NRBC AUTOMATED: 0 PER 100 WBC
PDW BLD-RTO: 12.3 % (ref 11.8–14.4)
PH UA: 5.5 (ref 5–8)
PLATELET # BLD: 134 K/UL (ref 138–453)
PMV BLD AUTO: 11.1 FL (ref 8.1–13.5)
POTASSIUM SERPL-SCNC: 3.9 MMOL/L (ref 3.7–5.3)
PRO-BNP: 167 PG/ML
PROTEIN UA: NEGATIVE
RBC # BLD: 5.39 M/UL (ref 4.21–5.77)
RBC UA: ABNORMAL /HPF (ref 0–2)
SEG NEUTROPHILS: 89 % (ref 36–65)
SEGMENTED NEUTROPHILS ABSOLUTE COUNT: 9.48 K/UL (ref 1.5–8.1)
SODIUM BLD-SCNC: 138 MMOL/L (ref 135–144)
SPECIFIC GRAVITY UA: 1.02 (ref 1–1.03)
TROPONIN, HIGH SENSITIVITY: 9 NG/L (ref 0–22)
TURBIDITY: CLEAR
URINE HGB: ABNORMAL
UROBILINOGEN, URINE: NORMAL
WBC # BLD: 10.7 K/UL (ref 3.5–11.3)
WBC UA: ABNORMAL /HPF (ref 0–5)

## 2022-07-24 PROCEDURE — 80048 BASIC METABOLIC PNL TOTAL CA: CPT

## 2022-07-24 PROCEDURE — 0DN80ZZ RELEASE SMALL INTESTINE, OPEN APPROACH: ICD-10-PCS | Performed by: FAMILY MEDICINE

## 2022-07-24 PROCEDURE — 3700000000 HC ANESTHESIA ATTENDED CARE: Performed by: SURGERY

## 2022-07-24 PROCEDURE — 3600000012 HC SURGERY LEVEL 2 ADDTL 15MIN: Performed by: SURGERY

## 2022-07-24 PROCEDURE — 2500000003 HC RX 250 WO HCPCS: Performed by: NURSE PRACTITIONER

## 2022-07-24 PROCEDURE — 6360000002 HC RX W HCPCS

## 2022-07-24 PROCEDURE — 6360000002 HC RX W HCPCS: Performed by: INTERNAL MEDICINE

## 2022-07-24 PROCEDURE — 6360000004 HC RX CONTRAST MEDICATION: Performed by: STUDENT IN AN ORGANIZED HEALTH CARE EDUCATION/TRAINING PROGRAM

## 2022-07-24 PROCEDURE — 93005 ELECTROCARDIOGRAM TRACING: CPT | Performed by: STUDENT IN AN ORGANIZED HEALTH CARE EDUCATION/TRAINING PROGRAM

## 2022-07-24 PROCEDURE — 2709999900 HC NON-CHARGEABLE SUPPLY: Performed by: SURGERY

## 2022-07-24 PROCEDURE — 99223 1ST HOSP IP/OBS HIGH 75: CPT | Performed by: INTERNAL MEDICINE

## 2022-07-24 PROCEDURE — 85025 COMPLETE CBC W/AUTO DIFF WBC: CPT

## 2022-07-24 PROCEDURE — 96374 THER/PROPH/DIAG INJ IV PUSH: CPT

## 2022-07-24 PROCEDURE — 84484 ASSAY OF TROPONIN QUANT: CPT

## 2022-07-24 PROCEDURE — 6360000002 HC RX W HCPCS: Performed by: STUDENT IN AN ORGANIZED HEALTH CARE EDUCATION/TRAINING PROGRAM

## 2022-07-24 PROCEDURE — 74177 CT ABD & PELVIS W/CONTRAST: CPT

## 2022-07-24 PROCEDURE — 0WQF0ZZ REPAIR ABDOMINAL WALL, OPEN APPROACH: ICD-10-PCS | Performed by: FAMILY MEDICINE

## 2022-07-24 PROCEDURE — 6360000002 HC RX W HCPCS: Performed by: NURSE PRACTITIONER

## 2022-07-24 PROCEDURE — 2580000003 HC RX 258: Performed by: STUDENT IN AN ORGANIZED HEALTH CARE EDUCATION/TRAINING PROGRAM

## 2022-07-24 PROCEDURE — 2580000003 HC RX 258

## 2022-07-24 PROCEDURE — 83880 ASSAY OF NATRIURETIC PEPTIDE: CPT

## 2022-07-24 PROCEDURE — 1200000000 HC SEMI PRIVATE

## 2022-07-24 PROCEDURE — APPSS45 APP SPLIT SHARED TIME 31-45 MINUTES: Performed by: NURSE PRACTITIONER

## 2022-07-24 PROCEDURE — 74018 RADEX ABDOMEN 1 VIEW: CPT

## 2022-07-24 PROCEDURE — 83605 ASSAY OF LACTIC ACID: CPT

## 2022-07-24 PROCEDURE — 2720000010 HC SURG SUPPLY STERILE: Performed by: SURGERY

## 2022-07-24 PROCEDURE — 96375 TX/PRO/DX INJ NEW DRUG ADDON: CPT

## 2022-07-24 PROCEDURE — 81001 URINALYSIS AUTO W/SCOPE: CPT

## 2022-07-24 PROCEDURE — 3600000002 HC SURGERY LEVEL 2 BASE: Performed by: SURGERY

## 2022-07-24 PROCEDURE — C9113 INJ PANTOPRAZOLE SODIUM, VIA: HCPCS | Performed by: NURSE PRACTITIONER

## 2022-07-24 PROCEDURE — 2500000003 HC RX 250 WO HCPCS: Performed by: STUDENT IN AN ORGANIZED HEALTH CARE EDUCATION/TRAINING PROGRAM

## 2022-07-24 PROCEDURE — 2580000003 HC RX 258: Performed by: NURSE PRACTITIONER

## 2022-07-24 PROCEDURE — A4216 STERILE WATER/SALINE, 10 ML: HCPCS | Performed by: NURSE PRACTITIONER

## 2022-07-24 PROCEDURE — 83690 ASSAY OF LIPASE: CPT

## 2022-07-24 PROCEDURE — 71045 X-RAY EXAM CHEST 1 VIEW: CPT

## 2022-07-24 PROCEDURE — 3700000001 HC ADD 15 MINUTES (ANESTHESIA): Performed by: SURGERY

## 2022-07-24 PROCEDURE — 7100000000 HC PACU RECOVERY - FIRST 15 MIN: Performed by: SURGERY

## 2022-07-24 PROCEDURE — 7100000001 HC PACU RECOVERY - ADDTL 15 MIN: Performed by: SURGERY

## 2022-07-24 PROCEDURE — 2500000003 HC RX 250 WO HCPCS

## 2022-07-24 RX ORDER — ONDANSETRON 2 MG/ML
4 INJECTION INTRAMUSCULAR; INTRAVENOUS EVERY 6 HOURS PRN
Status: DISCONTINUED | OUTPATIENT
Start: 2022-07-24 | End: 2022-07-28 | Stop reason: HOSPADM

## 2022-07-24 RX ORDER — SODIUM CHLORIDE 0.9 % (FLUSH) 0.9 %
5-40 SYRINGE (ML) INJECTION EVERY 12 HOURS SCHEDULED
Status: DISCONTINUED | OUTPATIENT
Start: 2022-07-24 | End: 2022-07-28 | Stop reason: HOSPADM

## 2022-07-24 RX ORDER — SODIUM CHLORIDE 0.9 % (FLUSH) 0.9 %
5-40 SYRINGE (ML) INJECTION PRN
Status: DISCONTINUED | OUTPATIENT
Start: 2022-07-24 | End: 2022-07-24

## 2022-07-24 RX ORDER — ROCURONIUM BROMIDE 10 MG/ML
INJECTION, SOLUTION INTRAVENOUS PRN
Status: DISCONTINUED | OUTPATIENT
Start: 2022-07-24 | End: 2022-07-24 | Stop reason: SDUPTHER

## 2022-07-24 RX ORDER — HEPARIN SODIUM 5000 [USP'U]/ML
5000 INJECTION, SOLUTION INTRAVENOUS; SUBCUTANEOUS EVERY 8 HOURS SCHEDULED
Status: DISCONTINUED | OUTPATIENT
Start: 2022-07-24 | End: 2022-07-28 | Stop reason: HOSPADM

## 2022-07-24 RX ORDER — POLYETHYLENE GLYCOL 3350 17 G/17G
17 POWDER, FOR SOLUTION ORAL DAILY PRN
Status: DISCONTINUED | OUTPATIENT
Start: 2022-07-24 | End: 2022-07-24

## 2022-07-24 RX ORDER — ONDANSETRON 4 MG/1
4 TABLET, ORALLY DISINTEGRATING ORAL EVERY 8 HOURS PRN
Status: DISCONTINUED | OUTPATIENT
Start: 2022-07-24 | End: 2022-07-24

## 2022-07-24 RX ORDER — MORPHINE SULFATE 2 MG/ML
2 INJECTION, SOLUTION INTRAMUSCULAR; INTRAVENOUS ONCE
Status: COMPLETED | OUTPATIENT
Start: 2022-07-24 | End: 2022-07-24

## 2022-07-24 RX ORDER — 0.9 % SODIUM CHLORIDE 0.9 %
80 INTRAVENOUS SOLUTION INTRAVENOUS ONCE
Status: DISCONTINUED | OUTPATIENT
Start: 2022-07-24 | End: 2022-07-28 | Stop reason: HOSPADM

## 2022-07-24 RX ORDER — SODIUM CHLORIDE 9 MG/ML
INJECTION, SOLUTION INTRAVENOUS PRN
Status: DISCONTINUED | OUTPATIENT
Start: 2022-07-24 | End: 2022-07-28 | Stop reason: HOSPADM

## 2022-07-24 RX ORDER — MAGNESIUM SULFATE 1 G/100ML
1000 INJECTION INTRAVENOUS PRN
Status: DISCONTINUED | OUTPATIENT
Start: 2022-07-24 | End: 2022-07-28 | Stop reason: HOSPADM

## 2022-07-24 RX ORDER — ACETAMINOPHEN 325 MG/1
650 TABLET ORAL EVERY 6 HOURS PRN
Status: DISCONTINUED | OUTPATIENT
Start: 2022-07-24 | End: 2022-07-24

## 2022-07-24 RX ORDER — FENTANYL CITRATE 50 UG/ML
25 INJECTION, SOLUTION INTRAMUSCULAR; INTRAVENOUS EVERY 5 MIN PRN
Status: DISCONTINUED | OUTPATIENT
Start: 2022-07-24 | End: 2022-07-24

## 2022-07-24 RX ORDER — DEXAMETHASONE SODIUM PHOSPHATE 10 MG/ML
INJECTION, SOLUTION INTRAMUSCULAR; INTRAVENOUS PRN
Status: DISCONTINUED | OUTPATIENT
Start: 2022-07-24 | End: 2022-07-24 | Stop reason: SDUPTHER

## 2022-07-24 RX ORDER — FENTANYL CITRATE 50 UG/ML
INJECTION, SOLUTION INTRAMUSCULAR; INTRAVENOUS PRN
Status: DISCONTINUED | OUTPATIENT
Start: 2022-07-24 | End: 2022-07-24 | Stop reason: SDUPTHER

## 2022-07-24 RX ORDER — FENTANYL CITRATE 50 UG/ML
50 INJECTION, SOLUTION INTRAMUSCULAR; INTRAVENOUS
Status: DISCONTINUED | OUTPATIENT
Start: 2022-07-24 | End: 2022-07-24

## 2022-07-24 RX ORDER — SODIUM CHLORIDE 0.9 % (FLUSH) 0.9 %
10 SYRINGE (ML) INJECTION PRN
Status: DISCONTINUED | OUTPATIENT
Start: 2022-07-24 | End: 2022-07-28 | Stop reason: HOSPADM

## 2022-07-24 RX ORDER — ACETAMINOPHEN 650 MG/1
650 SUPPOSITORY RECTAL EVERY 6 HOURS PRN
Status: DISCONTINUED | OUTPATIENT
Start: 2022-07-24 | End: 2022-07-24

## 2022-07-24 RX ORDER — ENOXAPARIN SODIUM 100 MG/ML
40 INJECTION SUBCUTANEOUS DAILY
Status: DISCONTINUED | OUTPATIENT
Start: 2022-07-24 | End: 2022-07-24

## 2022-07-24 RX ORDER — SODIUM CHLORIDE 9 MG/ML
INJECTION, SOLUTION INTRAVENOUS PRN
Status: DISCONTINUED | OUTPATIENT
Start: 2022-07-24 | End: 2022-07-24

## 2022-07-24 RX ORDER — ONDANSETRON 2 MG/ML
4 INJECTION INTRAMUSCULAR; INTRAVENOUS
Status: DISCONTINUED | OUTPATIENT
Start: 2022-07-24 | End: 2022-07-24

## 2022-07-24 RX ORDER — POTASSIUM CHLORIDE 7.45 MG/ML
10 INJECTION INTRAVENOUS PRN
Status: DISCONTINUED | OUTPATIENT
Start: 2022-07-24 | End: 2022-07-28 | Stop reason: HOSPADM

## 2022-07-24 RX ORDER — ONDANSETRON 2 MG/ML
4 INJECTION INTRAMUSCULAR; INTRAVENOUS ONCE
Status: COMPLETED | OUTPATIENT
Start: 2022-07-24 | End: 2022-07-24

## 2022-07-24 RX ORDER — FENTANYL CITRATE 50 UG/ML
50 INJECTION, SOLUTION INTRAMUSCULAR; INTRAVENOUS
Status: DISCONTINUED | OUTPATIENT
Start: 2022-07-24 | End: 2022-07-28

## 2022-07-24 RX ORDER — LIDOCAINE HYDROCHLORIDE 20 MG/ML
INJECTION, SOLUTION EPIDURAL; INFILTRATION; INTRACAUDAL; PERINEURAL PRN
Status: DISCONTINUED | OUTPATIENT
Start: 2022-07-24 | End: 2022-07-24 | Stop reason: SDUPTHER

## 2022-07-24 RX ORDER — HYDROMORPHONE HYDROCHLORIDE 1 MG/ML
0.5 INJECTION, SOLUTION INTRAMUSCULAR; INTRAVENOUS; SUBCUTANEOUS EVERY 5 MIN PRN
Status: DISCONTINUED | OUTPATIENT
Start: 2022-07-24 | End: 2022-07-24

## 2022-07-24 RX ORDER — LABETALOL HYDROCHLORIDE 5 MG/ML
10 INJECTION, SOLUTION INTRAVENOUS
Status: DISCONTINUED | OUTPATIENT
Start: 2022-07-24 | End: 2022-07-24

## 2022-07-24 RX ORDER — KETOROLAC TROMETHAMINE 30 MG/ML
INJECTION, SOLUTION INTRAMUSCULAR; INTRAVENOUS PRN
Status: DISCONTINUED | OUTPATIENT
Start: 2022-07-24 | End: 2022-07-24 | Stop reason: SDUPTHER

## 2022-07-24 RX ORDER — HYDRALAZINE HYDROCHLORIDE 20 MG/ML
10 INJECTION INTRAMUSCULAR; INTRAVENOUS EVERY 6 HOURS PRN
Status: DISCONTINUED | OUTPATIENT
Start: 2022-07-24 | End: 2022-07-28 | Stop reason: HOSPADM

## 2022-07-24 RX ORDER — PROCHLORPERAZINE EDISYLATE 5 MG/ML
5 INJECTION INTRAMUSCULAR; INTRAVENOUS
Status: DISCONTINUED | OUTPATIENT
Start: 2022-07-24 | End: 2022-07-24

## 2022-07-24 RX ORDER — SODIUM CHLORIDE 0.9 % (FLUSH) 0.9 %
5-40 SYRINGE (ML) INJECTION EVERY 12 HOURS SCHEDULED
Status: DISCONTINUED | OUTPATIENT
Start: 2022-07-24 | End: 2022-07-24

## 2022-07-24 RX ORDER — ONDANSETRON 2 MG/ML
INJECTION INTRAMUSCULAR; INTRAVENOUS PRN
Status: DISCONTINUED | OUTPATIENT
Start: 2022-07-24 | End: 2022-07-24 | Stop reason: SDUPTHER

## 2022-07-24 RX ORDER — DEXTROSE, SODIUM CHLORIDE, AND POTASSIUM CHLORIDE 5; .45; .15 G/100ML; G/100ML; G/100ML
INJECTION INTRAVENOUS CONTINUOUS
Status: DISCONTINUED | OUTPATIENT
Start: 2022-07-24 | End: 2022-07-28

## 2022-07-24 RX ORDER — POTASSIUM CHLORIDE 20 MEQ/1
40 TABLET, EXTENDED RELEASE ORAL PRN
Status: DISCONTINUED | OUTPATIENT
Start: 2022-07-24 | End: 2022-07-24

## 2022-07-24 RX ORDER — PROPOFOL 10 MG/ML
INJECTION, EMULSION INTRAVENOUS PRN
Status: DISCONTINUED | OUTPATIENT
Start: 2022-07-24 | End: 2022-07-24 | Stop reason: SDUPTHER

## 2022-07-24 RX ADMIN — POTASSIUM CHLORIDE, DEXTROSE MONOHYDRATE AND SODIUM CHLORIDE: 150; 5; 450 INJECTION, SOLUTION INTRAVENOUS at 23:33

## 2022-07-24 RX ADMIN — FENTANYL CITRATE 50 MCG: 50 INJECTION, SOLUTION INTRAMUSCULAR; INTRAVENOUS at 11:35

## 2022-07-24 RX ADMIN — HYDROMORPHONE HYDROCHLORIDE 0.5 MG: 1 INJECTION, SOLUTION INTRAMUSCULAR; INTRAVENOUS; SUBCUTANEOUS at 14:44

## 2022-07-24 RX ADMIN — PROPOFOL 200 MG: 10 INJECTION, EMULSION INTRAVENOUS at 13:19

## 2022-07-24 RX ADMIN — FENTANYL CITRATE 50 MCG: 50 INJECTION, SOLUTION INTRAMUSCULAR; INTRAVENOUS at 13:13

## 2022-07-24 RX ADMIN — SUGAMMADEX 400 MG: 100 INJECTION, SOLUTION INTRAVENOUS at 14:15

## 2022-07-24 RX ADMIN — IOPAMIDOL 75 ML: 755 INJECTION, SOLUTION INTRAVENOUS at 03:05

## 2022-07-24 RX ADMIN — ONDANSETRON 4 MG: 2 INJECTION INTRAMUSCULAR; INTRAVENOUS at 14:10

## 2022-07-24 RX ADMIN — FENTANYL CITRATE 50 MCG: 50 INJECTION, SOLUTION INTRAMUSCULAR; INTRAVENOUS at 18:49

## 2022-07-24 RX ADMIN — HEPARIN SODIUM 5000 UNITS: 5000 INJECTION INTRAVENOUS; SUBCUTANEOUS at 23:23

## 2022-07-24 RX ADMIN — PIPERACILLIN AND TAZOBACTAM 4500 MG: 4; .5 INJECTION, POWDER, LYOPHILIZED, FOR SOLUTION INTRAVENOUS at 09:34

## 2022-07-24 RX ADMIN — FENTANYL CITRATE 50 MCG: 50 INJECTION, SOLUTION INTRAMUSCULAR; INTRAVENOUS at 09:39

## 2022-07-24 RX ADMIN — LIDOCAINE HYDROCHLORIDE 100 MG: 20 INJECTION, SOLUTION EPIDURAL; INFILTRATION; INTRACAUDAL; PERINEURAL at 13:17

## 2022-07-24 RX ADMIN — FENTANYL CITRATE 50 MCG: 50 INJECTION, SOLUTION INTRAMUSCULAR; INTRAVENOUS at 13:08

## 2022-07-24 RX ADMIN — HYDROMORPHONE HYDROCHLORIDE 0.5 MG: 1 INJECTION, SOLUTION INTRAMUSCULAR; INTRAVENOUS; SUBCUTANEOUS at 04:46

## 2022-07-24 RX ADMIN — POTASSIUM CHLORIDE, DEXTROSE MONOHYDRATE AND SODIUM CHLORIDE: 150; 5; 450 INJECTION, SOLUTION INTRAVENOUS at 06:07

## 2022-07-24 RX ADMIN — ROCURONIUM BROMIDE 100 MG: 10 INJECTION, SOLUTION INTRAVENOUS at 13:19

## 2022-07-24 RX ADMIN — MORPHINE SULFATE 2 MG: 2 INJECTION, SOLUTION INTRAMUSCULAR; INTRAVENOUS at 02:21

## 2022-07-24 RX ADMIN — SODIUM CHLORIDE 40 MG: 9 INJECTION, SOLUTION INTRAMUSCULAR; INTRAVENOUS; SUBCUTANEOUS at 06:31

## 2022-07-24 RX ADMIN — FENTANYL CITRATE 50 MCG: 50 INJECTION, SOLUTION INTRAMUSCULAR; INTRAVENOUS at 14:34

## 2022-07-24 RX ADMIN — PIPERACILLIN AND TAZOBACTAM 3375 MG: 3; .375 INJECTION, POWDER, LYOPHILIZED, FOR SOLUTION INTRAVENOUS at 16:53

## 2022-07-24 RX ADMIN — ONDANSETRON 4 MG: 2 INJECTION INTRAMUSCULAR; INTRAVENOUS at 02:20

## 2022-07-24 RX ADMIN — SODIUM CHLORIDE, PRESERVATIVE FREE 10 ML: 5 INJECTION INTRAVENOUS at 03:05

## 2022-07-24 RX ADMIN — Medication 80 ML: at 03:05

## 2022-07-24 RX ADMIN — HEPARIN SODIUM 5000 UNITS: 5000 INJECTION INTRAVENOUS; SUBCUTANEOUS at 08:20

## 2022-07-24 RX ADMIN — DEXAMETHASONE SODIUM PHOSPHATE 10 MG: 10 INJECTION, SOLUTION INTRAMUSCULAR; INTRAVENOUS at 13:50

## 2022-07-24 RX ADMIN — KETOROLAC TROMETHAMINE 30 MG: 30 INJECTION, SOLUTION INTRAMUSCULAR at 13:59

## 2022-07-24 ASSESSMENT — ENCOUNTER SYMPTOMS
RESPIRATORY NEGATIVE: 1
NAUSEA: 1
EYE DISCHARGE: 0
ABDOMINAL DISTENTION: 1
EYES NEGATIVE: 1
CONSTIPATION: 0
SHORTNESS OF BREATH: 0
ABDOMINAL DISTENTION: 0
ABDOMINAL PAIN: 1
COLOR CHANGE: 0
EYE REDNESS: 0
VOMITING: 1

## 2022-07-24 ASSESSMENT — PAIN DESCRIPTION - ORIENTATION
ORIENTATION: MID;LOWER
ORIENTATION: MID
ORIENTATION: MID
ORIENTATION: MID;LOWER
ORIENTATION: MID
ORIENTATION: MID
ORIENTATION: MID;UPPER

## 2022-07-24 ASSESSMENT — PAIN - FUNCTIONAL ASSESSMENT
PAIN_FUNCTIONAL_ASSESSMENT: PREVENTS OR INTERFERES SOME ACTIVE ACTIVITIES AND ADLS
PAIN_FUNCTIONAL_ASSESSMENT: ACTIVITIES ARE NOT PREVENTED
PAIN_FUNCTIONAL_ASSESSMENT: ACTIVITIES ARE NOT PREVENTED

## 2022-07-24 ASSESSMENT — PAIN DESCRIPTION - DESCRIPTORS
DESCRIPTORS: ACHING;CRAMPING
DESCRIPTORS: SORE
DESCRIPTORS: ACHING;CRAMPING;THROBBING;SHARP
DESCRIPTORS: SORE
DESCRIPTORS: SORE
DESCRIPTORS: ACHING
DESCRIPTORS: ACHING;THROBBING;SHARP

## 2022-07-24 ASSESSMENT — PAIN SCALES - GENERAL
PAINLEVEL_OUTOF10: 7
PAINLEVEL_OUTOF10: 9
PAINLEVEL_OUTOF10: 3
PAINLEVEL_OUTOF10: 3
PAINLEVEL_OUTOF10: 9
PAINLEVEL_OUTOF10: 7
PAINLEVEL_OUTOF10: 8
PAINLEVEL_OUTOF10: 7
PAINLEVEL_OUTOF10: 6

## 2022-07-24 ASSESSMENT — PAIN DESCRIPTION - LOCATION
LOCATION: ABDOMEN

## 2022-07-24 ASSESSMENT — PAIN DESCRIPTION - ONSET
ONSET: ON-GOING
ONSET: ON-GOING

## 2022-07-24 ASSESSMENT — PAIN DESCRIPTION - FREQUENCY
FREQUENCY: CONTINUOUS
FREQUENCY: CONTINUOUS

## 2022-07-24 ASSESSMENT — PAIN DESCRIPTION - PAIN TYPE
TYPE: ACUTE PAIN
TYPE: SURGICAL PAIN
TYPE: SURGICAL PAIN

## 2022-07-24 NOTE — PROGRESS NOTES
Patient received from pacu Vitals taken. Assessment completed. No distress noted. See doc flowsheet and transfer navigator for details. POC and education reviewed with patient. Call light within reach, and pt educated on its use. Bed in lowest position, and locked. Side rails up x 2. Denied further questions or needs at this time. Will continue to monitor.

## 2022-07-24 NOTE — ED NOTES
ED to inpatient nurses report     Chief Complaint   Patient presents with    Abdominal Pain     Upper abdominal pain, denies n/v/d. Started around noon      Present to ED from home with c/o mid upper abdominal pain that started around noon. Denies n/v/d.    LOC: alert and orientated to name, place, date  Vital signs   Vitals:    07/23/22 2317 07/23/22 2318   BP: (!) 146/84    Pulse: 75    Resp: 16    Temp:  98.1 °F (36.7 °C)   TempSrc:  Oral   SpO2: 97%    Weight: 220 lb (99.8 kg)    Height: 6' 3\" (1.905 m)       Oxygen Baseline 97% on RA    Current needs required ---   LDAs:   Peripheral IV 07/24/22 Right Antecubital (Active)     Mobility: Independent  Fall Risk:    Pending ED orders: ---  Present condition: stable  Code Status: FULL  Consults: IP CONSULT TO GENERAL SURGERY  IP CONSULT TO HOSPITALIST  []  Hospitalist  Completed  [] yes [] no Who:   []  Medicine  Completed  [] yes [] No Who:   []  Cardiology  Completed  [] yes [] No Who:   []  GI   Completed  [] yes [] No Who:   []  Neurology  Completed  [] yes [] No Who:   []  Nephrology Completed  [] yes [] No Who:    []  Vascular  Completed  [] yes [] No Who:   []  Ortho  Completed  [] yes [] No Who:     []  Surgery  Completed  [] yes [] No Who:    []  Urology  Completed  [] yes [] No Who:    []  CT Surgery Completed  [] yes [] No Who:   []  Podiatry  Completed  [] yes [] No Who:    []  Other    Completed  [] yes [] No Who:  Interventions: 2mg Morphine IV, 4mg Zofran IV, 0.5mg Dilaudid IV  Important Events:  NG tube placed       Electronically signed by Ximena Hodgson RN on 7/24/2022 at 5:14 AM       Ximena Hodgson [place]  07/24/22 3228

## 2022-07-24 NOTE — ANESTHESIA PRE PROCEDURE
injection 10 mL  10 mL IntraVENous PRN Tracie D Delgrosso, APRN - CNP        0.9 % sodium chloride infusion   IntraVENous PRN Tracie D Delgrosso, APRN - CNP        potassium chloride (KLOR-CON M) extended release tablet 40 mEq  40 mEq Oral PRN Tracie D Delgrosso, APRN - CNP        Or    potassium bicarb-citric acid (EFFER-K) effervescent tablet 40 mEq  40 mEq Oral PRN Tracie D Delgrosso, APRN - CNP        Or    potassium chloride 10 mEq/100 mL IVPB (Peripheral Line)  10 mEq IntraVENous PRN Tracie D Delgrosso, APRN - CNP        magnesium sulfate 1000 mg in dextrose 5% 100 mL IVPB  1,000 mg IntraVENous PRN Tracie D Delgrosso, APRN - CNP        ondansetron (ZOFRAN-ODT) disintegrating tablet 4 mg  4 mg Oral Q8H PRN Tracie D Delgrosso, APRN - CNP        Or    ondansetron (ZOFRAN) injection 4 mg  4 mg IntraVENous Q6H PRN Tracie D Delgrosso, APRN - CNP        polyethylene glycol (GLYCOLAX) packet 17 g  17 g Oral Daily PRN Tracie D Delgrosso, APRN - CNP        heparin (porcine) injection 5,000 Units  5,000 Units SubCUTAneous 3 times per day Elza Claros MD   5,000 Units at 07/24/22 0820    hydrALAZINE (APRESOLINE) injection 10 mg  10 mg IntraVENous Q6H PRN Diana Noyola MD        piperacillin-tazobactam (ZOSYN) 3,375 mg in dextrose 5 % 50 mL IVPB extended infusion (mini-bag)  3,375 mg IntraVENous Q8H Nancy Estes DO        fentaNYL (SUBLIMAZE) injection 50 mcg  50 mcg IntraVENous Q2H PRN Ulysess Klinefelter, DO   50 mcg at 07/24/22 3161       Allergies:  No Known Allergies    Problem List:    Patient Active Problem List   Diagnosis Code    Erectile dysfunction N52.9    Sleep apnea G47.30    Osteoarthritis M19.90    Hernia K46.9    BPH (benign prostatic hyperplasia) N40.0    Essential hypertension I10    Tubular adenoma D36.9    SBO (small bowel obstruction) ([facility] [address] K56.609       Past Medical History:        Diagnosis Date    Diverticulosis     DJD (degenerative joint disease)     Erectile dysfunction     Hemorrhoids     Hernia     Hypertension     Osteoarthritis     Sleep apnea     Tubular adenoma of colon        Past Surgical History:        Procedure Laterality Date    COLONOSCOPY  2013; due 2018    COLONOSCOPY N/A 7/7/2022    COLONOSCOPY DIAGNOSTIC performed by Judith Fatima MD at [facility]      rt. inguinal with mesh    JOINT REPLACEMENT  right knee       Social History:    Social History     Tobacco Use    Smoking status: Never    Smokeless tobacco: Never   Substance Use Topics    Alcohol use: Yes     Comment: social                                Counseling given: Not Answered      Vital Signs (Current):   Vitals:    07/24/22 0737 07/24/22 0939 07/24/22 1009 07/24/22 1119   BP: (!) 142/69   (!) 154/77   Pulse: 60   60   Resp: 16 16 16 16   Temp: 98.1 °F (36.7 °C)   97.9 °F (36.6 °C)   TempSrc: Oral   Oral   SpO2: 96%   93%   Weight:       Height:                                                  BP Readings from Last 3 Encounters:   07/24/22 (!) 154/77   07/07/22 (!) 150/76   05/09/22 120/64       NPO Status: Time of last liquid consumption: 0000                        Time of last solid consumption: 0900                        Date of last liquid consumption: 07/23/22                        Date of last solid food consumption: 07/23/22    BMI:   Wt Readings from Last 3 Encounters:   07/24/22 225 lb (102.1 kg)   07/07/22 224 lb 1.6 oz (101.7 kg)   05/09/22 240 lb (108.9 kg)     Body mass index is 28.12 kg/m².     CBC:   Lab Results   Component Value Date/Time    WBC 10.7 07/24/2022 02:10 AM    RBC 5.39 07/24/2022 02:10 AM    HGB 16.8 07/24/2022 02:10 AM    HCT 49.3 07/24/2022 02:10 AM    MCV 91.5 07/24/2022 02:10 AM    RDW 12.3 07/24/2022 02:10 AM     07/24/2022 02:10 AM       CMP:   Lab Results   Component Value Date/Time     07/24/2022 02:10 AM    K 3.9 07/24/2022 02:10 AM     07/24/2022 02:10 AM    CO2 28 07/24/2022 02:10 AM    BUN 22 2022 02:10 AM    CREATININE 1.38 2022 02:10 AM    GFRAA >60 2022 02:10 AM    LABGLOM 50 2022 02:10 AM    GLUCOSE 142 2022 02:10 AM    PROT 6.9 2022 01:56 PM    CALCIUM 9.7 2022 02:10 AM    BILITOT 0.95 2022 01:56 PM    ALKPHOS 65 2022 01:56 PM    AST 21 2022 01:56 PM    ALT 18 2022 01:56 PM       POC Tests: No results for input(s): POCGLU, POCNA, POCK, POCCL, POCBUN, POCHEMO, POCHCT in the last 72 hours. Coags: No results found for: PROTIME, INR, APTT    HCG (If Applicable): No results found for: PREGTESTUR, PREGSERUM, HCG, HCGQUANT     ABGs: No results found for: PHART, PO2ART, OFE4EMD, WGI8IEL, BEART, E8JQJAMU     Type & Screen (If Applicable):  No results found for: LABABO, LABRH    Drug/Infectious Status (If Applicable):  Lab Results   Component Value Date/Time    HEPCAB NONREACTIVE 2018 08:22 AM       COVID-19 Screening (If Applicable): No results found for: COVID19        Anesthesia Evaluation  Patient summary reviewed no history of anesthetic complications:   Airway: Mallampati: II  TM distance: >3 FB   Neck ROM: full  Mouth opening: > = 3 FB   Dental: normal exam         Pulmonary:normal exam    (+) sleep apnea:      (-) COPD                           Cardiovascular:  Exercise tolerance: good (>4 METS),   (+) hypertension:,     (-) past MI, dysrhythmias and  angina    ECG reviewed  Rhythm: regular  Rate: normal           Beta Blocker:  Not on Beta Blocker         Neuro/Psych:      (-) seizures and CVA           GI/Hepatic/Renal:        (-) liver disease and no renal disease      ROS comment: BPH. Endo/Other:        (-) diabetes mellitus, hypothyroidism               Abdominal:             Vascular:     - DVT and PE.       Other Findings:        Department of Anesthesiology  Preprocedure Note       Name:  Ciarra Franz   Age:  77 y.o.  :  1945                                          MRN:  [id number]         Date:  2022 Surgeon: Surgeon(s):  Aiden Chance DO    Procedure: Procedure(s):  EXPLORATORY LAPAROTOMY LYSIS OF ADHESIONS    Medications prior to admission:   Prior to Admission medications    Medication Sig Start Date End Date Taking? Authorizing Provider   lisinopril-hydroCHLOROthiazide (PRINZIDE;ZESTORETIC) 20-25 MG per tablet take 1/2 tablet by mouth once daily 7/22/22   ROBB Najera CNP   Multiple Vitamins-Minerals (THERAPEUTIC MULTIVITAMIN-MINERALS) tablet Take 1 tablet by mouth daily    Historical Provider, MD   polyethylene glycol (GLYCOLAX) 17 GM/SCOOP powder Follow instructions provided to you from physician's office. 5/20/22   Tiffani Reina MD   bisacodyl (BISACODYL) 5 MG EC tablet Follow instructions provided given by the physician's office. Patient not taking: Reported on 7/24/2022 5/20/22   Tiffani Reina MD   CPAP Machine MISC by Does not apply route For nightly use. DX: G47.33 11/22/21   Anjali Haskins MD   [facility], ; to be used nightly. Change every 6 months. DX: G47.33 11/22/21 5/24/22  Anjali Haskins MD       Current medications:    No current facility-administered medications for this visit. No current outpatient medications on file.      Facility-Administered Medications Ordered in Other Visits   Medication Dose Route Frequency Provider Last Rate Last Admin    [facility] 0.9 % sodium chloride bolus  80 mL IntraVENous Once Ham Pro DO        [facility] sodium chloride flush 0.9 % injection 10 mL  10 mL IntraVENous PRN Ham Pro DO   10 mL at 07/24/22 0305    [MAR Hold] dextrose 5 % and 0.45 % NaCl with KCl 20 mEq infusion   IntraVENous Continuous ROBB Zhang  mL/hr at 07/24/22 1032 Rate Verify at 07/24/22 1032    [MAR Hold] sodium chloride flush 0.9 % injection 5-40 mL  5-40 mL IntraVENous 2 times per day ROBB Zhang CNP        [MAR Hold] sodium chloride flush 0.9 % injection 10 mL  10 mL IntraVENous PRN Tracie Camargo, APRN - CNP        [MAR Hold] 0.9 % sodium chloride infusion   IntraVENous PRN Tracie Camargo, APRN - CNP        [MAR Hold] potassium chloride (KLOR-CON M) extended release tablet 40 mEq  40 mEq Oral PRN Tracie Camargo APRN - CNP        Or    [MAR Hold] potassium bicarb-citric acid (EFFER-K) effervescent tablet 40 mEq  40 mEq Oral PRN Traciecathy Huertasossjeanette, APRN - CNP        Or    [MAR Hold] potassium chloride 10 mEq/100 mL IVPB (Peripheral Line)  10 mEq IntraVENous PRN Tracie Huertasossjeanette, APRN - CNP        [MAR Hold] magnesium sulfate 1000 mg in dextrose 5% 100 mL IVPB  1,000 mg IntraVENous PRN Tracie Camargo, APRN - CNP        [MAR Hold] ondansetron (ZOFRAN-ODT) disintegrating tablet 4 mg  4 mg Oral Q8H PRN ROBB Zhang - CNP        Or    [MAR Hold] ondansetron (ZOFRAN) injection 4 mg  4 mg IntraVENous Q6H PRN ROBB Zhang - CNP        [MAR Hold] polyethylene glycol (GLYCOLAX) packet 17 g  17 g Oral Daily PRN ROBB Zhang - CNP        [MAR Hold] heparin (porcine) injection 5,000 Units  5,000 Units SubCUTAneous 3 times per day Alvarado Whitt MD   5,000 Units at 07/24/22 0820    [MAR Hold] hydrALAZINE (APRESOLINE) injection 10 mg  10 mg IntraVENous Q6H PRN Alvarado Whitt MD        [facility] Hold] piperacillin-tazobactam (ZOSYN) 3,375 mg in dextrose 5 % 50 mL IVPB extended infusion (mini-bag)  3,375 mg IntraVENous Q8H Nancy Estes DO        [MAR Hold] fentaNYL (SUBLIMAZE) injection 50 mcg  50 mcg IntraVENous Q2H PRN Edi Gipson DO   50 mcg at 07/24/22 1135    fentaNYL (SUBLIMAZE) injection   IntraVENous PRN Swathi Tristan MD   50 mcg at 07/24/22 1313    lidocaine PF 2 % injection   IntraVENous PRN Swathi Tristan MD   100 mg at 07/24/22 1317    propofol injection   IntraVENous PRN Swathi Tristan MD   200 mg at 07/24/22 1319    rocuronium (ZEMURON) injection   IntraVENous PRN Mickey Lynn MD   100 mg at 07/24/22 1319    dexamethasone (PF) (DECADRON) injection   IntraVENous PRN Mickey Lynn MD   10 mg at 07/24/22 1350    ketorolac (TORADOL) injection   IntraVENous PRN Mickey Lynn MD   30 mg at 07/24/22 1359    sodium chloride flush 0.9 % injection 5-40 mL  5-40 mL IntraVENous 2 times per day Mickey Lynn MD        sodium chloride flush 0.9 % injection 5-40 mL  5-40 mL IntraVENous PRN Mickey Lynn MD        0.9 % sodium chloride infusion   IntraVENous PRN Mickey Lynn MD        fentaNYL (SUBLIMAZE) injection 25 mcg  25 mcg IntraVENous Q5 Min PRN Mickey Lynn MD        HYDROmorphone HCl PF (DILAUDID) injection 0.5 mg  0.5 mg IntraVENous Q5 Min PRN Mickey Lynn MD        prochlorperazine (COMPAZINE) injection 5 mg  5 mg IntraVENous Once PRN Mickey Lynn MD        labetalol (NORMODYNE;TRANDATE) injection 10 mg  10 mg IntraVENous Q15 Min PRN Mickey Lynn MD        ondansetron [facility]) injection 4 mg  4 mg IntraVENous Once PRN Mickey Lynn MD        ondansetron [facility]) injection   IntraVENous PRN Mickey Lynn MD   4 mg at 07/24/22 1410       Allergies:  No Known Allergies    Problem List:    Patient Active Problem List   Diagnosis Code    Erectile dysfunction N52.9    Sleep apnea G47.30    Osteoarthritis M19.90    Hernia K46.9    BPH (benign prostatic hyperplasia) N40.0    Essential hypertension I10    Tubular adenoma D36.9    SBO (small bowel obstruction) ([facility] [address] K56.609       Past Medical History:        Diagnosis Date    Diverticulosis     DJD (degenerative joint disease)     Erectile dysfunction     Hemorrhoids     Hernia     Hypertension     Osteoarthritis     Sleep apnea     Tubular adenoma of colon        Past Surgical History:        Procedure Laterality Date    COLONOSCOPY  2013; due 2018    COLONOSCOPY N/A 7/7/2022    COLONOSCOPY DIAGNOSTIC performed by Bubba Barron MD at [place] [address]      rt. inguinal with mesh    JOINT Applicable):  No results found for: LABABO, LABRH    Drug/Infectious Status (If Applicable):  Lab Results   Component Value Date/Time    HEPCAB NONREACTIVE 08/06/2018 08:22 AM       COVID-19 Screening (If Applicable): No results found for: COVID19        Anesthesia Evaluation    Airway: Mallampati: I  TM distance: >3 FB   Neck ROM: full  Mouth opening: > = 3 FB   Dental:          Pulmonary:   (+) sleep apnea:                             Cardiovascular:    (+) hypertension:,     (-)  angina                Neuro/Psych:               GI/Hepatic/Renal:             Endo/Other:                     Abdominal:             Vascular: Other Findings:           Anesthesia Plan      MAC     ASA 3                                   Liam Dotson MD   7/24/2022             Anesthesia Plan      general     ASA 2       Induction: intravenous. MIPS: Postoperative opioids intended and Prophylactic antiemetics administered. Anesthetic plan and risks discussed with patient. Plan discussed with CRNA.                     Liam Dotson MD   7/24/2022

## 2022-07-24 NOTE — PLAN OF CARE
Problem: Discharge Planning  Goal: Discharge to home or other facility with appropriate resources  7/24/2022 0828 by Zahira Tony RN  Outcome: Progressing  7/24/2022 0613 by Shaila Richards RN  Outcome: Progressing     Problem: Pain  Goal: Verbalizes/displays adequate comfort level or baseline comfort level  7/24/2022 0828 by Zahira Tony RN  Outcome: Progressing  7/24/2022 0613 by Shaila Richards RN  Outcome: Progressing     Problem: Safety - Adult  Goal: Free from fall injury  7/24/2022 0828 by Zahira Tony RN  Outcome: Progressing  7/24/2022 0613 by Shaila Richards RN  Outcome: Progressing     Problem: ABCDS Injury Assessment  Goal: Absence of physical injury  7/24/2022 0828 by Zahira Tony RN  Outcome: Progressing  7/24/2022 0613 by Shaila Richards RN  Outcome: Progressing

## 2022-07-24 NOTE — CARE COORDINATION
Case Management Initial Discharge Plan  Ama Soares,             Met with:patient to discuss discharge plans. Information verified: address, contacts, phone number, , insurance Yes  PCP: ROBB Herrera CNP  Date of last visit: 2022    Insurance Provider: Medicare    Discharge Planning    Living Arrangements:  Spouse/Significant Other   Support Systems:  Spouse/Significant Other    Home has 2 stories  1 stairs to climb to get into front door, 12 stairs to climb to reach second floor  Location of bedroom/bathroom in home  - second floor    Patient able to perform ADL's:Independent    Current Services (outpatient & in home) none  DME equipment: CPAP  DME provider:  [facility] [address]: [facility] and [place]     Potential Assistance Needed:  N/A    Patient agreeable to home care: No  [place] of choice provided:  n/a    Prior SNF/Rehab Placement and Facility: No  Agreeable to SNF/Rehab: No  [place] of choice provided: n/a   Evaluation: n/a    Expected Discharge date:     Patient expects to be discharged to:   home  Follow Up Appointment: Best Day/ Time:      Transportation provider: wife  Transportation arrangements needed for discharge: No    Readmission Risk              Risk of Unplanned Readmission:  [id number]             Does patient have a readmission risk score greater than 14?: No  If yes, follow-up appointment must be made within 7 days of discharge. Goal of Care:       Discharge Plan: Met with patient at bedside. Lives with wife, independent, drives and is self employed. Admitted for SBO and NG in place. Surgery consulted and awaiting recommendations. Continue to follow and no home needs anticipated at this time.            Electronically signed by Earl Allred RN on 22 at 8:49 AM EDT

## 2022-07-24 NOTE — PROGRESS NOTES
Pt admitted to room 2006 from ER. Oriented to room and call light/tv controls. Bed in lowest position, wheels locked, 2/4 side rails up  Call light in reach, room free of clutter, adequate lighting provided. Admission database, vital signs and assessment as charted. Home med list reviewed. Patient states list is correct. [id number] Surgical resident in to see patient.

## 2022-07-24 NOTE — ANESTHESIA POSTPROCEDURE EVALUATION
Department of Anesthesiology  Postprocedure Note    Patient: Ed Palma  MRN: [id number]  YOB: 1945  Date of evaluation: 7/24/2022      Procedure Summary     Date: 07/24/22 Room / Location: [place] [address] / [place] [address]    Anesthesia Start: 6954 Anesthesia Stop: 9106    Procedure: EXPLORATORY LAPAROTOMY LYSIS OF ADHESIONS UMBILICAL HERNIA REPAIR WITHOUT MESH (Abdomen) Diagnosis:       Small bowel obstruction ([address]      (Small bowel obstruction ([address] [X17.269])    Surgeons: Obie Godinez DO Responsible Provider: Roopa Morales MD    Anesthesia Type: general ASA Status: 2          Anesthesia Type: No value filed.     Fran Phase I: Fran Score: 8    Fran Phase II:        Anesthesia Post Evaluation    Patient location during evaluation: PACU  Patient participation: complete - patient participated  Level of consciousness: responsive to painful stimuli  Airway patency: patent  Nausea & Vomiting: no nausea and no vomiting  Complications: no  Cardiovascular status: hemodynamically stable  Respiratory status: acceptable  Hydration status: stable  Multimodal analgesia pain management approach

## 2022-07-24 NOTE — H&P
trace abdominopelvic ascites, diverticulosis, bilateral kidney stones. General surgery was consulted. Patient admitted for further management of small bowel obstruction. Past Medical History:     Past Medical History:   Diagnosis Date    Diverticulosis     DJD (degenerative joint disease)     Erectile dysfunction     Hemorrhoids     Hernia     Hypertension     Osteoarthritis     Sleep apnea     Tubular adenoma of colon         Past Surgical History:     Past Surgical History:   Procedure Laterality Date    COLONOSCOPY  2013; due 2018    COLONOSCOPY N/A 7/7/2022    COLONOSCOPY DIAGNOSTIC performed by Dasia Farnsworth MD at [facility]      rt. inguinal with mesh    JOINT REPLACEMENT  right knee        Medications Prior to Admission:     Prior to Admission medications    Medication Sig Start Date End Date Taking? Authorizing Provider   lisinopril-hydroCHLOROthiazide (PRINZIDE;ZESTORETIC) 20-25 MG per tablet take 1/2 tablet by mouth once daily 7/22/22   ROBB Gutierres - CNP   Multiple Vitamins-Minerals (THERAPEUTIC MULTIVITAMIN-MINERALS) tablet Take 1 tablet by mouth daily    Historical Provider, MD   polyethylene glycol (GLYCOLAX) 17 GM/SCOOP powder Follow instructions provided to you from physician's office. 5/20/22   Dasia Farnsworth MD   bisacodyl (BISACODYL) 5 MG EC tablet Follow instructions provided given by the physician's office. Patient not taking: Reported on 7/24/2022 5/20/22   Dasia Farnsworth MD   CPAP Machine MISC by Does not apply route For nightly use. DX: G47.33 11/22/21   Tarsha Rizzo MD   Respiratory Therapy Supplies MISC CPAP headgear, ; to be used nightly. Change every 6 months. DX: G47.33 11/22/21 5/24/22  Tarsha Rizzo MD        Allergies:     Patient has no known allergies. Social History:     Tobacco:    reports that he has never smoked. He has never used smokeless tobacco.  Alcohol:      reports current alcohol use.   Drug Use: effort is normal. No respiratory distress. Breath sounds: Normal breath sounds. No wheezing, rhonchi or rales. Abdominal:      General: There is distension. Palpations: Abdomen is soft. Tenderness: There is no abdominal tenderness. There is no guarding. Hernia: No hernia is present. Comments: Hyperactive tinkling bowel sounds   Musculoskeletal:         General: Normal range of motion. Right lower leg: No edema. Left lower leg: No edema. Skin:     General: Skin is warm. Coloration: Skin is not jaundiced. Findings: No bruising, erythema, lesion or rash. Neurological:      General: No focal deficit present. Mental Status: He is alert and oriented to person, place, and time. Psychiatric:         Mood and Affect: Mood normal.         Behavior: Behavior normal.         Thought Content:  Thought content normal.         Judgment: Judgment normal.       Investigations:      Laboratory Testing:  Recent Results (from the past 24 hour(s))   Urinalysis with Reflex to Culture    Collection Time: 07/24/22 12:01 AM    Specimen: Urine   Result Value Ref Range    Color, UA Yellow Yellow    Turbidity UA Clear Clear    Glucose, Ur NEGATIVE NEGATIVE    Bilirubin Urine NEGATIVE NEGATIVE    Ketones, Urine TRACE (A) NEGATIVE    Specific Gravity, UA 1.020 1.005 - 1.030    Urine Hgb TRACE (A) NEGATIVE    pH, UA 5.5 5.0 - 8.0    Protein, UA NEGATIVE NEGATIVE    Urobilinogen, Urine Normal Normal    Nitrite, Urine NEGATIVE NEGATIVE    Leukocyte Esterase, Urine NEGATIVE NEGATIVE   Microscopic Urinalysis    Collection Time: 07/24/22 12:01 AM   Result Value Ref Range    -          WBC, UA 0 TO 2 0 - 5 /HPF    RBC, UA 0 TO 2 0 - 2 /HPF    Casts UA 2 TO 5 /LPF    Casts UA HYALINE /LPF    Epithelial Cells UA 0 TO 2 0 - 5 /HPF    Bacteria, UA RARE (A) None    Mucus, UA 1+ (A) None   Basic Metabolic Panel    Collection Time: 07/24/22  2:10 AM   Result Value Ref Range    Glucose 142 (H) 70 - 99 mg/dL    BUN 22 8 - 23 mg/dL    Creatinine 1.38 (H) 0.70 - 1.20 mg/dL    Bun/Cre Ratio 16 9 - 20    Calcium 9.7 8.6 - 10.4 mg/dL    Sodium 138 135 - 144 mmol/L    Potassium 3.9 3.7 - 5.3 mmol/L    Chloride 100 98 - 107 mmol/L    CO2 28 20 - 31 mmol/L    Anion Gap 10 9 - 17 mmol/L    GFR Non-African American 50 (L) >60 mL/min    GFR African American >60 >60 mL/min    GFR Comment         Brain Natriuretic Peptide    Collection Time: 07/24/22  2:10 AM   Result Value Ref Range    Pro- <300 pg/mL   CBC with Auto Differential    Collection Time: 07/24/22  2:10 AM   Result Value Ref Range    WBC 10.7 3.5 - 11.3 k/uL    RBC 5.39 4.21 - 5.77 m/uL    Hemoglobin 16.8 13.0 - 17.0 g/dL    Hematocrit 49.3 40.7 - 50.3 %    MCV 91.5 82.6 - 102.9 fL    MCH 31.2 25.2 - 33.5 pg    MCHC 34.1 28.4 - 34.8 g/dL    RDW 12.3 11.8 - 14.4 %    Platelets 220 (L) 148 - 453 k/uL    MPV 11.1 8.1 - 13.5 fL    NRBC Automated 0.0 0.0 per 100 WBC    Seg Neutrophils 89 (H) 36 - 65 %    Lymphocytes 7 (L) 24 - 43 %    Monocytes 4 3 - 12 %    Eosinophils % 0 (L) 1 - 4 %    Basophils 0 0 - 2 %    Immature Granulocytes 0 0 %    Segs Absolute 9.48 (H) 1.50 - 8.10 k/uL    Absolute Lymph # 0.75 (L) 1.10 - 3.70 k/uL    Absolute Mono # 0.38 0.10 - 1.20 k/uL    Absolute Eos # <0.03 0.00 - 0.44 k/uL    Basophils Absolute 0.04 0.00 - 0.20 k/uL    Absolute Immature Granulocyte 0.04 0.00 - 0.30 k/uL   Troponin    Collection Time: 07/24/22  2:10 AM   Result Value Ref Range    Troponin, High Sensitivity 9 0 - 22 ng/L   Lactate, Sepsis    Collection Time: 07/24/22  2:10 AM   Result Value Ref Range    Lactic Acid, Sepsis 1.2 0.5 - 1.9 mmol/L   Lipase    Collection Time: 07/24/22  2:10 AM   Result Value Ref Range    Lipase 34 13 - 60 U/L   EKG 12 Lead    Collection Time: 07/24/22  2:30 AM   Result Value Ref Range    Ventricular Rate 57 BPM    Atrial Rate 57 BPM    P-R Interval 164 ms    QRS Duration 94 ms    Q-T Interval 440 ms    QTc Calculation (Bazett) 428 ms

## 2022-07-24 NOTE — ACP (ADVANCE CARE PLANNING)
Advance Care Planning     Advance Care Planning Activator (Inpatient)  Conversation Note      Date of ACP Conversation: 7/24/2022     Conversation Conducted with: Patient with Decision Making Capacity    ACP Activator: Joselito Arriaga RN    {When Decision Maker makes decisions on behalf of the incapacitated patient: Decision Maker is asked to consider and make decisions based on patient values, known preferences, or best interests. (    Health Care Decision Maker:     Current Designated Health Care Decision Maker:     Primary Decision Maker: Tonie Morocho - [facility] - [phone]  Click here to complete Healthcare Decision Makers including section of the Healthcare Decision Maker Relationship (ie \"Primary\")      Care Preferences    Ventilation: \"If you were in your present state of health and suddenly became very ill and were unable to breathe on your own, what would your preference be about the use of a ventilator (breathing machine) if it were available to you? \"      Would the patient desire the use of ventilator (breathing machine)?: yes    \"If your health worsens and it becomes clear that your chance of recovery is unlikely, what would your preference be about the use of a ventilator (breathing machine) if it were available to you? \"     Would the patient desire the use of ventilator (breathing machine)?: No      Resuscitation  \"CPR works best to restart the heart when there is a sudden event, like a heart attack, in someone who is otherwise healthy. Unfortunately, CPR does not typically restart the heart for people who have serious health conditions or who are very sick. \"    \"In the event your heart stopped as a result of an underlying serious health condition, would you want attempts to be made to restart your heart (answer \"yes\" for attempt to resuscitate) or would you prefer a natural death (answer \"no\" for do not attempt to resuscitate)? \" yes       [] Yes   [x] No   Educated Patient / Decision Maker regarding differences between Advance Directives and portable DNR orders.     Length of ACP Conversation in minutes:  5    Conversation Outcomes:  [x] ACP discussion completed  [] Existing advance directive reviewed with patient; no changes to patient's previously recorded wishes  [] New Advance Directive completed  [] Portable Do Not Rescitate prepared for Provider review and signature  [] POLST/POST/MOLST/MOST prepared for Provider review and signature      Follow-up plan:    [] Schedule follow-up conversation to continue planning  [] Referred individual to Provider for additional questions/concerns   [] Advised patient/agent/surrogate to review completed ACP document and update if needed with changes in condition, patient preferences or care setting    [] This note routed to one or more involved healthcare providers

## 2022-07-24 NOTE — FLOWSHEET NOTE
[facility] NOTE    Room # 2006/2006-02   Name: Yovany Phillip              Reason for visit: Routine    I visited the patient. Admit Date & Time: 7/24/2022  1:36 AM    Assessment:  Yovnay Phillip is a 77 y.o. male. Upon entering the room patient states about their medical condition, states struggles with their medical situation. States worries, fears frustrations. Patient states well , treated well. Patient states good family support, shares about spiritual life, Uats[employer] background, shares Uatsdin beliefs. Patient shares about outside interests. Intervention:   provided a ministry presence, listening and prayer. Outcome:  Patient open to visit. Plan:  Chaplains will remain available to offer spiritual and emotional support as needed. Electronically signed by Ashley Mcadams. Chaplain Lisandro, on 7/24/2022 at 5:55 PM.  Candida        07/24/22 4459   Encounter Summary   Service Provided For: Patient and family together   Referral/Consult From: [facility]   Support System Spouse   Last Encounter  07/24/22   Complexity of Encounter Moderate   Begin Time 0345   End Time  0405   Total Time Calculated 20 min   Encounter    Type Initial Screen/Assessment   Assessment/Intervention/Outcome   Assessment Calm; Hopeful   Intervention Discussed illness injury and its impact; Discussed belief system/Uatsdin practices/monalisa; Explored/Affirmed feelings, thoughts, concerns;Prayer (assurance of)/[place];Sustaining Presence/Ministry of presence   Outcome Receptive; Expressed Gratitude

## 2022-07-24 NOTE — CONSULTS
General Surgery:  Consult Note        PATIENT NAME: Tierney Maldonado   YOB: 1945    ADMISSION DATE: 7/24/2022  1:36 AM     Admitting Provider: Kenn Oneill Physician: Alberteen Kehr DATE: 7/24/2022    Chief Complaint:  abdominal pain, nausea,vomiting  Consult Regarding: Small bowel obstruction    HISTORY OF PRESENT ILLNESS:  The patient is a 77 y.o. male  who was admitted on 7/24/2022 with abdominal pain that began yesterday afternoon followed by nausea and vomiting, unable to tolerate p.o. Patient states he had normal bowel movement yesterday morning, large denies hematochezia melena. He ate lunch followed by some mild abdominal pain that worsened to severe abdominal pain this AM.  He states the car ride to the hospital was painful as bumps in the road caused abdominal pain. He has never had this type of pain before. He denies any abdominal surgical history, however chart review states he had right inguinal hernia repair with mesh. He did have colonoscopy performed on the seventh of this month. Biopsy of sigmoid and right colon were taken with right colon biopsy demonstrating tubular adenoma. He has yet to follow-up in office with gastroenterology for pathology report. Denies any family history of colon cancer. She denies any cardiac history, not currently taking blood thinners. He denies daily drinking or tobacco use. This admission labs significant for creatinine of 1.38, baseline appears to be 1.16-1. 30. White blood cell count 10.7, hemoglobin 16, lactic acid 1.2. CT scan shows concern for closed-loop small bowel obstruction in left upper quadrant. Stomach is dilated, NG tube was placed with 1.1 L output so far.       Past Medical History:        Diagnosis Date    Diverticulosis     DJD (degenerative joint disease)     Erectile dysfunction     Hemorrhoids     Hernia     Hypertension     Osteoarthritis     Sleep apnea     Tubular adenoma of colon        Past Surgical History:        Procedure Laterality Date    COLONOSCOPY  2013; due 2018    COLONOSCOPY N/A 7/7/2022    COLONOSCOPY DIAGNOSTIC performed by Jesus Enriquez MD at  [address]      rt. inguinal with mesh    JOINT REPLACEMENT  right knee       Medications Prior to Admission:   Medications Prior to Admission: lisinopril-hydroCHLOROthiazide (PRINZIDE;ZESTORETIC) 20-25 MG per tablet, take 1/2 tablet by mouth once daily  Multiple Vitamins-Minerals (THERAPEUTIC MULTIVITAMIN-MINERALS) tablet, Take 1 tablet by mouth daily  polyethylene glycol (GLYCOLAX) 17 GM/SCOOP powder, Follow instructions provided to you from physician's office. bisacodyl (BISACODYL) 5 MG EC tablet, Follow instructions provided given by the physician's office. (Patient not taking: Reported on 7/24/2022)  CPAP Machine MISC, by Does not apply route For nightly use. DX: G47.33  Respiratory Therapy Supplies MISC, CPAP headgear, [id number]; to be used nightly. Change every 6 months. DX: G47.33    Allergies:  Patient has no known allergies.     Social History:   Social History     Socioeconomic History    Marital status:      Spouse name: Not on file    Number of children: Not on file    Years of education: Not on file    Highest education level: Not on file   Occupational History    Not on file   Tobacco Use    Smoking status: Never    Smokeless tobacco: Never   Vaping Use    Vaping Use: Never used   Substance and Sexual Activity    Alcohol use: Yes     Comment: social    Drug use: No    Sexual activity: Not on file   Other Topics Concern    Not on file   Social History Narrative    Not on file     Social Determinants of Health     Financial Resource Strain: Low Risk     Difficulty of Paying Living Expenses: Not hard at all   Food Insecurity: No Food Insecurity    Worried About Running Out of Food in the Last Year: Never true    Ran Out of Food in the Last Year: Never true   Transportation Needs: Not on file   Physical Activity: Not on file   Stress: Not on file   Social Connections: Not on file   Intimate Partner Violence: Not on file   Housing Stability: Not on file       Family History:       Problem Relation Age of Onset    No Known Problems Mother     No Known Problems Father        REVIEW OF SYSTEMS:    CONSTITUTIONAL: Denies recent weight loss, fatigue, fevers, chills. HEENT: Denies rhinorrhea, dysphagia, odynphagia. CARDIOVASCULAR: Denies history of MI, recent chest pain. RESPIRATORY: Denies recent history of shortness of breath or history of PE. GASTROINTESTINAL: Endorses mid abdominal pain, nausea, vomiting  GENITOURINARY: Denies increased frequency or dysuria. HEMATOLOGIC/LYMPHATIC: Denies history of anemia or DVTs. ENDOCRINE: Denies history of thyroid problems or diabetes. NEURO: Denies history of CVA, TIA. Review of systems negative unless listed above. PHYSICAL EXAM:    VITALS:  BP (!) 142/69   Pulse 60   Temp 98.1 °F (36.7 °C) (Oral)   Resp 16   Ht 6' 3\" (1.905 m)   Wt 225 lb (102.1 kg)   SpO2 96%   BMI 28.12 kg/m²   INTAKE/OUTPUT:     Intake/Output Summary (Last 24 hours) at 7/24/2022 0805  Last data filed at 7/24/2022 9787  Gross per 24 hour   Intake --   Output 1100 ml   Net -1100 ml       CONSTITUTIONAL:  awake, alert, not distressed and mildly obese  HEENT: Normocephalic/atraumatic, without obvious abnormality. NGT in place  NECK:  Supple, symmetrical, trachea midline   CARDIOVASCULAR: Regular rate and rhythm  LUNGS: equal chest rise and fall, no increased WOB, no audible stridor or wheeze  ABDOMEN: Soft, nondistended,  tender in left midabdomen to palpation  MUSCULOSKELETAL: Muscle strength intact in all extremities bilaterally. NEUROLOGIC: Gross motor intact without focal weakness. SKIN: No cyanosis, rashes, or edema noted.    Orientation:   oriented to person, place, and time    IV Access: Peripheral IVs    CBC with Differential:    Lab Results   Component Value Date/Time    WBC 10.7 07/24/2022 02:10 AM    RBC 5.39 07/24/2022 02:10 AM    HGB 16.8 07/24/2022 02:10 AM    HCT 49.3 07/24/2022 02:10 AM     07/24/2022 02:10 AM    MCV 91.5 07/24/2022 02:10 AM    MCH 31.2 07/24/2022 02:10 AM    MCHC 34.1 07/24/2022 02:10 AM    RDW 12.3 07/24/2022 02:10 AM    LYMPHOPCT 7 07/24/2022 02:10 AM    MONOPCT 4 07/24/2022 02:10 AM    BASOPCT 0 07/24/2022 02:10 AM    MONOSABS 0.38 07/24/2022 02:10 AM    LYMPHSABS 0.75 07/24/2022 02:10 AM    EOSABS <0.03 07/24/2022 02:10 AM    BASOSABS 0.04 07/24/2022 02:10 AM    DIFFTYPE NOT REPORTED 11/18/2021 03:55 PM     BMP:    Lab Results   Component Value Date/Time     07/24/2022 02:10 AM    K 3.9 07/24/2022 02:10 AM     07/24/2022 02:10 AM    CO2 28 07/24/2022 02:10 AM    BUN 22 07/24/2022 02:10 AM    LABALBU 4.6 07/11/2022 01:56 PM    CREATININE 1.38 07/24/2022 02:10 AM    CALCIUM 9.7 07/24/2022 02:10 AM    GFRAA >60 07/24/2022 02:10 AM    LABGLOM 50 07/24/2022 02:10 AM    GLUCOSE 142 07/24/2022 02:10 AM       Pertinent Radiology:   CT ABDOMEN PELVIS W IV CONTRAST Additional Contrast? None    Result Date: 7/24/2022  EXAMINATION: CT OF THE ABDOMEN AND PELVIS WITH CONTRAST 7/24/2022 2:56 am TECHNIQUE: CT of the abdomen and pelvis was performed with the administration of intravenous contrast. Multiplanar reformatted images are provided for review. Automated exposure control, iterative reconstruction, and/or weight based adjustment of the mA/kV was utilized to reduce the radiation dose to as low as reasonably achievable. COMPARISON: None. HISTORY: ORDERING SYSTEM PROVIDED HISTORY: Generalized abdominal pain, nausea, abdominal bloating TECHNOLOGIST PROVIDED HISTORY: Generalized abdominal pain, nausea, abdominal bloating Decision Support Exception - unselect if not a suspected or confirmed emergency medical condition->Emergency Medical Condition (MA) FINDINGS: Lower Chest: Partially visualized clustered nodular opacities in the central right lower lobe.   Bilateral ground-glass and linear opacities are also noted. Organs: The liver, gallbladder, pancreas, spleen, kidneys, and adrenals reveal no acute findings. Bilateral punctate nephrolithiasis. GI/Bowel: Dilated fluid-filled small bowel is noted with focal transition in the left mid abdomen with a closed loop configuration on axial image 136. No pneumatosis. Mesenteric edema is noted. Colonic diverticulosis. Pelvis: No acute findings. Prostatomegaly with findings of bladder wall hypertrophy. Peritoneum/Retroperitoneum: Trace abdominopelvic ascites. No free air. Bones/Soft Tissues: No acute findings. 1.  Small-bowel obstruction with closed loop configuration. Mild mesenteric edema and trace abdominopelvic ascites. 2.  Partially visualized clustered nodular opacities in the central right lower lobe. This favors an inflammatory process. Follow-up with chest CT is recommended as this is only partially visualized. 3.  Bilateral punctate nephrolithiasis. 4.  Diverticulosis. XR CHEST PORTABLE    Result Date: 7/24/2022  EXAMINATION: ONE XRAY VIEW OF THE CHEST 7/24/2022 2:08 am COMPARISON: 05/23/2019 HISTORY: ORDERING SYSTEM PROVIDED HISTORY: Gastric pain TECHNOLOGIST PROVIDED HISTORY: Gastric pain Reason for Exam: Gastric pain FINDINGS: Shallow inflation. The cardiomediastinal silhouette is within normal limits. Calcified granuloma in the mid right lung field again noted. There is no consolidation, pneumothorax or evidence for edema. No evidence for effusion. No acute osseous abnormality is identified. No acute airspace disease identified.      XR ABDOMEN FOR NG/OG/NE TUBE PLACEMENT    Result Date: 7/24/2022  EXAMINATION: ONE SUPINE XRAY VIEW(S) OF THE ABDOMEN 7/24/2022 4:47 am COMPARISON: CT abdomen and pelvis 07/24/2022 HISTORY: ORDERING SYSTEM PROVIDED HISTORY: Confirmation of course of NG/OG/NE tube and location of tip of tube TECHNOLOGIST PROVIDED HISTORY: Confirmation of course of NG/OG/NE tube and location of tip of tube Portable? ->Yes Reason for Exam: ng placement FINDINGS: Enteric tube tip and side port project over the gastric body. Unremarkable bowel gas pattern in the partially imaged abdomen. Enteric tube tip and side port project over the gastric body. ASSESSMENT:  Active Hospital Problems    Diagnosis Date Noted    SBO (small bowel obstruction) ([facility] [address] [N45.739] 07/24/2022     Priority: Medium    Essential hypertension [I10] 11/17/2015    BPH (benign prostatic hyperplasia) [N40.0] 05/14/2015       52-year-old male with closed-loop small bowel obstruction    Plan:  Continue medical mgmt and supportive care per primary  Diet: N.p.o.  Maintain NG tube to low intermittent wall suction  Fentanyl 50 every 2 hours as needed for pain control  Maintenance IV fluids  IV Zosyn  Patient presenting with closed-loop bowel obstruction, no abdominal surgical history, focal tenderness to left abdomen, will plan for OR this afternoon for exploratory laparotomy, possible bowel resection, all indicated procedures. Risks and benefits of surgery explained, patient expressed understanding, questions answered and consent obtained.         Electronically signed by Diego Culp DO  on 7/24/2022 at 8:05 AM

## 2022-07-25 LAB
ABSOLUTE EOS #: 0 K/UL (ref 0–0.4)
ABSOLUTE IMMATURE GRANULOCYTE: 0 K/UL (ref 0–0.3)
ABSOLUTE LYMPH #: 0.67 K/UL (ref 1–4.8)
ABSOLUTE MONO #: 0.58 K/UL (ref 0.2–0.8)
ALBUMIN SERPL-MCNC: 3.7 G/DL (ref 3.5–5.2)
ALP BLD-CCNC: 50 U/L (ref 40–129)
ALT SERPL-CCNC: 10 U/L (ref 5–41)
AMYLASE: 34 U/L (ref 28–100)
ANION GAP SERPL CALCULATED.3IONS-SCNC: 8 MMOL/L (ref 9–17)
AST SERPL-CCNC: 11 U/L
BASOPHILS # BLD: 0 %
BASOPHILS ABSOLUTE: 0 K/UL (ref 0–0.2)
BILIRUB SERPL-MCNC: 1.02 MG/DL (ref 0.3–1.2)
BUN BLDV-MCNC: 23 MG/DL (ref 8–23)
BUN/CREAT BLD: 17 (ref 9–20)
CALCIUM SERPL-MCNC: 8.4 MG/DL (ref 8.6–10.4)
CHLORIDE BLD-SCNC: 103 MMOL/L (ref 98–107)
CO2: 25 MMOL/L (ref 20–31)
CREAT SERPL-MCNC: 1.38 MG/DL (ref 0.7–1.2)
EKG ATRIAL RATE: 57 BPM
EKG P AXIS: 62 DEGREES
EKG P-R INTERVAL: 164 MS
EKG Q-T INTERVAL: 440 MS
EKG QRS DURATION: 94 MS
EKG QTC CALCULATION (BAZETT): 428 MS
EKG R AXIS: -4 DEGREES
EKG T AXIS: 64 DEGREES
EKG VENTRICULAR RATE: 57 BPM
EOSINOPHILS RELATIVE PERCENT: 0 % (ref 1–4)
GFR AFRICAN AMERICAN: >60 ML/MIN
GFR NON-AFRICAN AMERICAN: 50 ML/MIN
GFR SERPL CREATININE-BSD FRML MDRD: ABNORMAL ML/MIN/{1.73_M2}
GLUCOSE BLD-MCNC: 146 MG/DL (ref 70–99)
HCT VFR BLD CALC: 43 % (ref 40.7–50.3)
HEMOGLOBIN: 14.6 G/DL (ref 13–17)
IMMATURE GRANULOCYTES: 0 %
LIPASE: 17 U/L (ref 13–60)
LYMPHOCYTES # BLD: 7 % (ref 24–44)
MAGNESIUM: 1.8 MG/DL (ref 1.6–2.6)
MCH RBC QN AUTO: 31.6 PG (ref 25.2–33.5)
MCHC RBC AUTO-ENTMCNC: 34 G/DL (ref 28.4–34.8)
MCV RBC AUTO: 93.1 FL (ref 82.6–102.9)
MONOCYTES # BLD: 6 % (ref 1–7)
NRBC AUTOMATED: 0 PER 100 WBC
PDW BLD-RTO: 12.4 % (ref 11.8–14.4)
PHOSPHORUS: 2.6 MG/DL (ref 2.5–4.5)
PLATELET # BLD: 138 K/UL (ref 138–453)
PMV BLD AUTO: 11.4 FL (ref 8.1–13.5)
POTASSIUM SERPL-SCNC: 4.3 MMOL/L (ref 3.7–5.3)
RBC # BLD: 4.62 M/UL (ref 4.21–5.77)
SEG NEUTROPHILS: 87 % (ref 36–66)
SEGMENTED NEUTROPHILS ABSOLUTE COUNT: 8.35 K/UL (ref 1.8–7.7)
SODIUM BLD-SCNC: 136 MMOL/L (ref 135–144)
TOTAL PROTEIN: 5.8 G/DL (ref 6.4–8.3)
WBC # BLD: 9.6 K/UL (ref 3.5–11.3)

## 2022-07-25 PROCEDURE — 83735 ASSAY OF MAGNESIUM: CPT

## 2022-07-25 PROCEDURE — 6370000000 HC RX 637 (ALT 250 FOR IP)

## 2022-07-25 PROCEDURE — 83690 ASSAY OF LIPASE: CPT

## 2022-07-25 PROCEDURE — 1200000000 HC SEMI PRIVATE

## 2022-07-25 PROCEDURE — 2580000003 HC RX 258

## 2022-07-25 PROCEDURE — 84100 ASSAY OF PHOSPHORUS: CPT

## 2022-07-25 PROCEDURE — 2500000003 HC RX 250 WO HCPCS

## 2022-07-25 PROCEDURE — 6360000002 HC RX W HCPCS

## 2022-07-25 PROCEDURE — 85025 COMPLETE CBC W/AUTO DIFF WBC: CPT

## 2022-07-25 PROCEDURE — 82150 ASSAY OF AMYLASE: CPT

## 2022-07-25 PROCEDURE — 99232 SBSQ HOSP IP/OBS MODERATE 35: CPT | Performed by: INTERNAL MEDICINE

## 2022-07-25 PROCEDURE — 80053 COMPREHEN METABOLIC PANEL: CPT

## 2022-07-25 PROCEDURE — 36415 COLL VENOUS BLD VENIPUNCTURE: CPT

## 2022-07-25 RX ADMIN — HEPARIN SODIUM 5000 UNITS: 5000 INJECTION INTRAVENOUS; SUBCUTANEOUS at 05:33

## 2022-07-25 RX ADMIN — FENTANYL CITRATE 50 MCG: 50 INJECTION, SOLUTION INTRAMUSCULAR; INTRAVENOUS at 18:48

## 2022-07-25 RX ADMIN — HEPARIN SODIUM 5000 UNITS: 5000 INJECTION INTRAVENOUS; SUBCUTANEOUS at 15:44

## 2022-07-25 RX ADMIN — PIPERACILLIN AND TAZOBACTAM 3375 MG: 3; .375 INJECTION, POWDER, LYOPHILIZED, FOR SOLUTION INTRAVENOUS at 11:25

## 2022-07-25 RX ADMIN — HEPARIN SODIUM 5000 UNITS: 5000 INJECTION INTRAVENOUS; SUBCUTANEOUS at 21:45

## 2022-07-25 RX ADMIN — FENTANYL CITRATE 50 MCG: 50 INJECTION, SOLUTION INTRAMUSCULAR; INTRAVENOUS at 21:45

## 2022-07-25 RX ADMIN — PIPERACILLIN AND TAZOBACTAM 3375 MG: 3; .375 INJECTION, POWDER, LYOPHILIZED, FOR SOLUTION INTRAVENOUS at 23:48

## 2022-07-25 RX ADMIN — PIPERACILLIN AND TAZOBACTAM 3375 MG: 3; .375 INJECTION, POWDER, LYOPHILIZED, FOR SOLUTION INTRAVENOUS at 01:32

## 2022-07-25 RX ADMIN — FENTANYL CITRATE 50 MCG: 50 INJECTION, SOLUTION INTRAMUSCULAR; INTRAVENOUS at 01:29

## 2022-07-25 RX ADMIN — Medication 1 SPRAY: at 07:37

## 2022-07-25 RX ADMIN — POTASSIUM CHLORIDE, DEXTROSE MONOHYDRATE AND SODIUM CHLORIDE: 150; 5; 450 INJECTION, SOLUTION INTRAVENOUS at 18:48

## 2022-07-25 RX ADMIN — FENTANYL CITRATE 50 MCG: 50 INJECTION, SOLUTION INTRAMUSCULAR; INTRAVENOUS at 12:17

## 2022-07-25 RX ADMIN — FENTANYL CITRATE 50 MCG: 50 INJECTION, SOLUTION INTRAMUSCULAR; INTRAVENOUS at 15:43

## 2022-07-25 RX ADMIN — FENTANYL CITRATE 50 MCG: 50 INJECTION, SOLUTION INTRAMUSCULAR; INTRAVENOUS at 23:51

## 2022-07-25 RX ADMIN — POTASSIUM CHLORIDE, DEXTROSE MONOHYDRATE AND SODIUM CHLORIDE: 150; 5; 450 INJECTION, SOLUTION INTRAVENOUS at 08:12

## 2022-07-25 ASSESSMENT — PAIN DESCRIPTION - DESCRIPTORS
DESCRIPTORS: ACHING
DESCRIPTORS: CRAMPING
DESCRIPTORS: ACHING
DESCRIPTORS: CRAMPING
DESCRIPTORS: CRAMPING;DISCOMFORT

## 2022-07-25 ASSESSMENT — PAIN DESCRIPTION - ORIENTATION
ORIENTATION: MID

## 2022-07-25 ASSESSMENT — PAIN DESCRIPTION - LOCATION
LOCATION: ABDOMEN

## 2022-07-25 ASSESSMENT — PAIN SCALES - GENERAL
PAINLEVEL_OUTOF10: 8
PAINLEVEL_OUTOF10: 7
PAINLEVEL_OUTOF10: 8
PAINLEVEL_OUTOF10: 6
PAINLEVEL_OUTOF10: 10
PAINLEVEL_OUTOF10: 7

## 2022-07-25 ASSESSMENT — PAIN DESCRIPTION - FREQUENCY
FREQUENCY: CONTINUOUS

## 2022-07-25 ASSESSMENT — PAIN DESCRIPTION - ONSET
ONSET: ON-GOING

## 2022-07-25 ASSESSMENT — PAIN - FUNCTIONAL ASSESSMENT
PAIN_FUNCTIONAL_ASSESSMENT: ACTIVITIES ARE NOT PREVENTED

## 2022-07-25 ASSESSMENT — PAIN DESCRIPTION - PAIN TYPE
TYPE: ACUTE PAIN;SURGICAL PAIN
TYPE: SURGICAL PAIN
TYPE: ACUTE PAIN;SURGICAL PAIN

## 2022-07-25 NOTE — CARE COORDINATION
POD #1 exp lap, lysis of adhesions and umbilical hernia repair per Dr. Ricky Villa. Remains NPO with NG to LIWS. Continue to follow post op.

## 2022-07-25 NOTE — OP NOTE
Operative Note      Patient: Darell Martinez  YOB: 1945  MRN: [id number]    Date of Procedure: 7/24/2022    Pre-Op Diagnosis: Small bowel obstruction ([address] [Q48.555]    Post-Op Diagnosis: small bowel obstruction, small bowel adhesion, umbilical hernia       Procedure(s):  EXPLORATORY LAPAROTOMY LYSIS OF ADHESIONS UMBILICAL HERNIA REPAIR WITHOUT MESH    Surgeon(s):  Jennifer Kelly DO    Assistant:   Resident: lD Benoit DO    Anesthesia: General    Estimated Blood Loss (mL): less than 50     Complications: None    Specimens:   * No specimens in log *    Implants:  * No implants in log *      Drains:   NG/OG/NJ/NE Tube Nasogastric 18 fr Right nostril (Active)   Surrounding Skin Clean, dry & intact 07/24/22 2000   Securement device Adhesive based dove 07/24/22 2000   Status Suction-low intermittent 07/24/22 2000   Placement Verified Gastric Contents; External Catheter Length 07/24/22 2000   NG/OG/NJ/NE External Measurement (cm) 64 cm 07/24/22 2000   Drainage Appearance Green;Brown 07/24/22 2000   Output (mL) 400 ml 07/24/22 0611       Urinary Catheter (Active)   $ Urethral catheter insertion Inserted for procedure 07/24/22 1547   Catheter Indications Perioperative use for selected surgical procedures 07/25/22 0000   Site Assessment No urethral drainage 07/25/22 0000   Urine Color Yellow 07/25/22 0000   Urine Appearance Clear 07/25/22 0000   Collection Container Standard 07/25/22 0000   Securement Method Leg strap 07/25/22 0000   Status Patent;Draining 07/25/22 0000   Output (mL) 200 mL 07/25/22 0403       Findings: wound class 1, viable, hyperemic segment of small bowel with bowel to bowel adhesion. Small <7MF umbilical hernia. Detailed Description of Procedure: This is a 71-year-old male who presented with acute onset abdominal pain. Severe focal tenderness of left abdomen. He has no past abdominal surgical history. CT scan showed concern for closed-loop obstruction.   Decision was made to perform exploratory laparotomy. Patient was brought to the operating room and assisted in transfer to the operating table. Patient underwent intubation and general anesthesia per anesthesiologist.  All bony prominences and pressure points were appropriately padded. A Linder catheter was placed. Patient was then prepped and draped in the usual sterile fashion. Timeout was performed identifying correct patient, correct procedure, and indications. We began by making a midline incision from just inferior to the umbilicus to the just superior to the mons pubis with 10 blade scalpel. The subcutaneous tissues were divided using Bovie electrocautery and anterior fascia was scored with electrocautery. The peritoneum was entered using Metzenbaum scissors. The fascia was opened utilizing 2 fingers and Bovie electrocautery to protect underlying abdominal structures. An Nilesh Bolk wound protector was placed. Abdomen was inspected for injury upon entry, and none were noted. A dilated loop of small bowel was palpated and maneuvered into view. It was edematous and hyperemic with indentations and remnants of band tissue bordering hyperemic margins. This suggested adhesive band small bowel obstruction with band lysed during palpation. Bowel contents were seen moving past the area of obstruction. The small bowel was then ran from the ligament of Treitz to the ileocecal valve with no other abnormalities noted. The stomach was then palpated and NG tube confirmed in appropriate position. The liver was palpated without abnormalities. During the course of exploration the concerning portion of small bowel improved in appearance, becoming more pink and less violaceous indicating well-perfused viable bowel. The omentum was then placed over hollow viscus organs. The small umbilical hernia measuring 2cm was noted. This contained pre-peritoneal fat only. The defect was closed within the fascial closure of the abdomen.      The fascia was closed with a 0 looped PDS suture in a running fashion. The incision was irrigated and agitated with normal saline, and skin was closed with staples. This concluded the procedure. All sponge and instrument and lap counts were deemed correct. Patient tolerated the procedure well was extubated and transported to his room in excellent condition. 500 cc urine output during case and 500 cc crystalloid given. Electronically signed by Roxane Cormier DO on 7/25/2022 at 5:00 AM      General Surgery Attending Attestation Note    Agree with above. For the entirety of the case, I was present, scrubbed in, and performed the critical portions of the operation.     __________________________________________    Ramón Flores DO  [address] [facility], [place], [facility],[address]  Office: [phone]  Pager: [phone]  ___________________________________________

## 2022-07-25 NOTE — PLAN OF CARE
Problem: Discharge Planning  Goal: Discharge to home or other facility with appropriate resources  7/24/2022 2010 by Viridiana Mansfield RN  Outcome: Progressing  7/24/2022 0828 by Basilia Hall RN  Outcome: Progressing  7/24/2022 0613 by Viridiana Mansfield RN  Outcome: Progressing     Problem: Pain  Goal: Verbalizes/displays adequate comfort level or baseline comfort level  7/24/2022 2010 by Viridiana Mansfield RN  Outcome: Progressing  7/24/2022 0828 by Basilia Hall RN  Outcome: Progressing  7/24/2022 0613 by Viridiana Mansfield RN  Outcome: Progressing     Problem: Safety - Adult  Goal: Free from fall injury  7/24/2022 2010 by Viridiana Mansfield RN  Outcome: Progressing  7/24/2022 0828 by Basilia Hall RN  Outcome: Progressing  7/24/2022 0613 by Viridiana Mansfield RN  Outcome: Progressing     Problem: ABCDS Injury Assessment  Goal: Absence of physical injury  7/24/2022 2010 by Viridiana Mansfield RN  Outcome: Progressing  7/24/2022 0828 by Basilia Hall RN  Outcome: Progressing  7/24/2022 0613 by Viridiana Mansfield RN  Outcome: Progressing

## 2022-07-25 NOTE — FLOWSHEET NOTE
07/25/22 1643   Encounter Summary   Encounter Overview/Reason   Encounter  (7/25/22)   Service Provided For: Patient   Referral/Consult From: Rounding   Last Encounter  07/25/22   Complexity of Encounter Low   Rituals, Rites and Sacraments   Type Anointing

## 2022-07-25 NOTE — PROGRESS NOTES
[facility]  Office: [address], DO, Redd Hanks, DO, Dori Langston, DO, Celeste Washingtons Blood, DO, Eileen Castro MD, Yanira Russ MD, Brijesh Alvarez MD, Reyes Cyr MD,  Swathi Deleon MD, Rickie Foster MD, Miguelina Altamirano, DO, Lanie Laird MD,  Danae Ha MD, Isabelle Mishra MD, Tao Rosario, DO, Shirley Kovacs MD, Severiano Border, MD, Jimbo Hinkle MD, Mel Damico, DO, Nenita Jackson MD, Myranda Mena MD, Yemi Freed, CNP,  Denisha Anne CNP, Albina Salter CNP, Angelica Dove, CNP, Caryl Blake PADarienC, [place], [facility], Odette Hughes, CNP, Savanah Hughes, CNP, Meek Ordonez, [facility], Coty Malone, CNP, Tanisha Hutchinson, CNS, Sophia Márquez DNP, Claudell Belfast, CNP, Benjy Azevedo, CNP, Dre Douglas, CNP, Marivel Guerrero, [facility]    Progress Note    7/25/2022    1:58 PM    Name:   Emmanuel Horowitz  MRN:     [id number]     Acct:      [de-identified]   Room:   2006/2006-02  IP Day:  1  Admit Date:  7/24/2022  1:36 AM    PCP:   ROBB Nixon CNP  Code Status:  Full Code    Subjective:     C/C:   Chief Complaint   Patient presents with    Abdominal Pain     Upper abdominal pain, denies n/v/d. Started around noon     Interval History Status: . Patient had exploratory laparotomy yesterday with lysis of adhesions and umbilical hernia repair  No flatus or bowel movement yet  Has NG tube in place,   walking in hallway    Brief History:     Emmanuel Horowitz is a 77 y.o. Non- / non  male who presents with Abdominal Pain (Upper abdominal pain, denies n/v/d. Started around noon)   and is admitted to the hospital for the management of SBO (small bowel obstruction) ([facility] [address] Patient started having abdominal pressure and nausea yesterday afternoon. No history of abdominal surgeries, although patient says that he did have a colonoscopy earlier this month.  He says polyps were seen on colonoscopy. He has PMH of HTN and BPH. While in ED, CT abdomen revealed SBO, mild mesenteric edema and trace abdominopelvic ascites, diverticulosis, bilateral kidney stones. General surgery was consulted. Patient admitted for further management of small bowel obstruction. Review of Systems:     Constitutional:  negative for chills, fevers, sweats  Respiratory:  negative for cough, dyspnea on exertion, shortness of breath, wheezing  Cardiovascular:  negative for chest pain, chest pressure/discomfort, lower extremity edema, palpitations  Gastrointestinal:  negative for abdominal pain, nausea, vomiting, did not pass flatus yet  Neurological:  negative for dizziness, headache    Medications: Allergies:  No Known Allergies    Current Meds:   Scheduled Meds:    sodium chloride  80 mL IntraVENous Once    sodium chloride flush  5-40 mL IntraVENous 2 times per day    heparin (porcine)  5,000 Units SubCUTAneous 3 times per day    piperacillin-tazobactam  3,375 mg IntraVENous Q8H     Continuous Infusions:    dextrose 5% and 0.45% NaCl with KCl 20 mEq 125 mL/hr at 07/25/22 0710    sodium chloride       PRN Meds: phenol, sodium chloride flush, sodium chloride flush, sodium chloride, [DISCONTINUED] potassium chloride **OR** [DISCONTINUED] potassium alternative oral replacement **OR** potassium chloride, magnesium sulfate, [DISCONTINUED] ondansetron **OR** ondansetron, hydrALAZINE, fentanNYL    Data:     Past Medical History:   has a past medical history of Diverticulosis, DJD (degenerative joint disease), Erectile dysfunction, Hemorrhoids, Hernia, Hypertension, Osteoarthritis, Sleep apnea, and Tubular adenoma of colon. Social History:   reports that he has never smoked. He has never used smokeless tobacco. He reports current alcohol use. He reports that he does not use drugs.      Family History:   Family History   Problem Relation Age of Onset    No Known Problems Mother     No Known Problems Father Vitals:  BP (!) 125/56   Pulse 64   Temp 98.2 °F (36.8 °C) (Oral)   Resp 18   Ht 6' 3\" (1.905 m)   Wt 234 lb (106.1 kg)   SpO2 95%   BMI 29.25 kg/m²   Temp (24hrs), Av °F (36.7 °C), Min:97.5 °F (36.4 °C), Max:98.4 °F (36.9 °C)    No results for input(s): POCGLU in the last 72 hours. I/O (24Hr): Intake/Output Summary (Last 24 hours) at 2022 1358  Last data filed at 2022 0820  Gross per 24 hour   Intake 1254.9 ml   Output 1080 ml   Net 174.9 ml       Labs:  Hematology:  Recent Labs     22  0556   WBC 10.7 9.6   RBC 5.39 4.62   HGB 16.8 14.6   HCT 49.3 43.0   MCV 91.5 93.1   MCH 31.2 31.6   MCHC 34.1 34.0   RDW 12.3 12.4   * 138   MPV 11.1 11.4     Chemistry:  Recent Labs     22  0556    136   K 3.9 4.3    103   CO2 28 25   GLUCOSE 142* 146*   BUN 22 23   CREATININE 1.38* 1.38*   MG  --  1.8   ANIONGAP 10 8*   LABGLOM 50* 50*   GFRAA >60 >60   CALCIUM 9.7 8.4*   PHOS  --  2.6   PROBNP 167  --    TROPHS 9  --      Recent Labs     22  0556   PROT  --  5.8*   LABALBU  --  3.7   AST  --  11   ALT  --  10   ALKPHOS  --  50   BILITOT  --  1.02   AMYLASE  --  34   LIPASE 34 17     ABG:No results found for: POCPH, PHART, PH, POCPCO2, VXG6URC, PCO2, POCPO2, PO2ART, PO2, POCHCO3, GCP6JTQ, HCO3, NBEA, PBEA, BEART, BE, THGBART, THB, HTF3KEU, BGZK7LKH, N8UXUMXX, O2SAT, FIO2  No results found for: SPECIAL  No results found for: CULTURE    Radiology:  CT ABDOMEN PELVIS W IV CONTRAST Additional Contrast? None    Result Date: 2022  1. Small-bowel obstruction with closed loop configuration. Mild mesenteric edema and trace abdominopelvic ascites. 2.  Partially visualized clustered nodular opacities in the central right lower lobe. This favors an inflammatory process. Follow-up with chest CT is recommended as this is only partially visualized. 3.  Bilateral punctate nephrolithiasis. 4.  Diverticulosis.      XR CHEST PORTABLE    Result Date: 7/24/2022  No acute airspace disease identified. XR ABDOMEN FOR NG/OG/NE TUBE PLACEMENT    Result Date: 7/24/2022  Enteric tube tip and side port project over the gastric body.        Physical Examination:        General appearance:  alert, cooperative and no distress, walking in hallway,+ NG tube  Mental Status:  oriented to person, place and time and normal affect  Lungs:  clear to auscultation bilaterally, normal effort  Heart:  regular rate and rhythm, no murmur  Abdomen:  + Postsurgical dressing in place, no bowel sounds, no masses, hepatomegaly, splenomegaly,+ Linder's  Extremities:  no edema, redness, tenderness in the calves  Skin:  no gross lesions, rashes, induration    Assessment:        Hospital Problems             Last Modified POA    * (Principal) SBO (small bowel obstruction) ([address] 7/24/2022 Yes    CKD (chronic kidney disease) 7/24/2022 Yes    BPH (benign prostatic hyperplasia) (Chronic) 7/24/2022 Yes    Essential hypertension (Chronic) 7/24/2022 Yes       Plan:        IV fluids  NGT to LI WS   continue IV Zosyn for now  Pain control with as needed fentanyl  DVT prophylaxis with heparin  Incentive spirometry  Discontinue Linder's cath    Zuleika Munoz MD  7/25/2022  1:58 PM

## 2022-07-25 NOTE — PROGRESS NOTES
Transitions of Care Pharmacy Service   Medication Review    The patient's list of current home medications has been reviewed. Source(s) of information: spoke to patient and sure scripts     Based on information provided by the above source(s), I have updated the patient's home med list as described below. I changed or updated the following medications on the patient's home medication list:  Discontinued polyethylene glycol (GLYCOLAX) 17 GM/SCOOP powder  bisacodyl (BISACODYL) 5 MG EC tablet     Added None      Adjusted   None      Other Notes None            Please feel free to call me with any questions about this encounter. Thank you. This note will be reviewed and co-signed by the [facility] Pharmacist. The pharmacist will review inpatient orders and contact the physician about any discrepancies.     Fred Sherwood, pharmacy technician  Transitions [facility] Service  Phone:  [phone]  Fax: [phone]      Electronically signed by Fred Sherwood on 7/25/2022 at 2:51 PM         Prior to Admission medications    Medication Sig   lisinopril-hydroCHLOROthiazide (PRINZIDE;ZESTORETIC) 20-25 MG per tablet take 1/2 tablet by mouth once daily   Multiple Vitamins-Minerals (THERAPEUTIC MULTIVITAMIN-MINERALS) tablet Take 1 tablet by mouth daily

## 2022-07-25 NOTE — PROGRESS NOTES
General Surgery:  Daily Progress Note                    PATIENT NAME: Ophelia Cooks     TODAY'S DATE: 7/25/2022, 5:53 AM  CC: Mild abdominal pain    SUBJECTIVE:     Pt seen and examined at bedside. No acute events overnight, afebrile, hemodynamically stable, on 2 L nasal cannula satting 96%. Abdominal pain controlled. NG tube in place with 200 cc output, Linder in place, urine output appropriate. Not yet passing gas or having bowel movements. Denies nausea vomiting. Tolerating n.p.o.      OBJECTIVE:   VITALS:  /68   Pulse 60   Temp 97.5 °F (36.4 °C) (Oral)   Resp 16   Ht 6' 3\" (1.905 m)   Wt 234 lb (106.1 kg)   SpO2 96%   BMI 29.25 kg/m²      INTAKE/OUTPUT:      Intake/Output Summary (Last 24 hours) at 7/25/2022 0553  Last data filed at 7/25/2022 0537  Gross per 24 hour   Intake 1839.63 ml   Output 1630 ml   Net 209.63 ml       PHYSICAL EXAM:  General Appearance: awake, alert, oriented, in no acute distress  HEENT:  Normocephalic, atraumatic, mucus membranes moist    NGT  Heart: Heart regular rate and rhythm  Lungs: Breathing Pattern: regular, no distress  Abdomen: Soft, appropriately tender to palpation, no guarding or peritoneal signs  Incision: Dressing in place, clean dry and intact, abdominal binder in place  Extremities: No cyanosis, pitting edema, rashes noted. Skin: Skin color, texture, turgor normal. No rashes or lesions.     IV Access: Peripheral IVs  Linder in place    Data:  CBC with Differential:    Lab Results   Component Value Date/Time    WBC 10.7 07/24/2022 02:10 AM    RBC 5.39 07/24/2022 02:10 AM    HGB 16.8 07/24/2022 02:10 AM    HCT 49.3 07/24/2022 02:10 AM     07/24/2022 02:10 AM    MCV 91.5 07/24/2022 02:10 AM    MCH 31.2 07/24/2022 02:10 AM    MCHC 34.1 07/24/2022 02:10 AM    RDW 12.3 07/24/2022 02:10 AM    LYMPHOPCT 7 07/24/2022 02:10 AM    MONOPCT 4 07/24/2022 02:10 AM    BASOPCT 0 07/24/2022 02:10 AM    MONOSABS 0.38 07/24/2022 02:10 AM    LYMPHSABS 0.75 07/24/2022 02:10 AM    EOSABS <0.03 07/24/2022 02:10 AM    BASOSABS 0.04 07/24/2022 02:10 AM    DIFFTYPE NOT REPORTED 11/18/2021 03:55 PM     CMP:    Lab Results   Component Value Date/Time     07/24/2022 02:10 AM    K 3.9 07/24/2022 02:10 AM     07/24/2022 02:10 AM    CO2 28 07/24/2022 02:10 AM    BUN 22 07/24/2022 02:10 AM    CREATININE 1.38 07/24/2022 02:10 AM    GFRAA >60 07/24/2022 02:10 AM    LABGLOM 50 07/24/2022 02:10 AM    GLUCOSE 142 07/24/2022 02:10 AM    PROT 6.9 07/11/2022 01:56 PM    LABALBU 4.6 07/11/2022 01:56 PM    CALCIUM 9.7 07/24/2022 02:10 AM    BILITOT 0.95 07/11/2022 01:56 PM    ALKPHOS 65 07/11/2022 01:56 PM    AST 21 07/11/2022 01:56 PM    ALT 18 07/11/2022 01:56 PM       Radiology Review:  CT ABDOMEN PELVIS W IV CONTRAST Additional Contrast? None    Result Date: 7/24/2022  1. Small-bowel obstruction with closed loop configuration. Mild mesenteric edema and trace abdominopelvic ascites. 2.  Partially visualized clustered nodular opacities in the central right lower lobe. This favors an inflammatory process. Follow-up with chest CT is recommended as this is only partially visualized. 3.  Bilateral punctate nephrolithiasis. 4.  Diverticulosis. XR CHEST PORTABLE    Result Date: 7/24/2022  No acute airspace disease identified. XR ABDOMEN FOR NG/OG/NE TUBE PLACEMENT    Result Date: 7/24/2022  Enteric tube tip and side port project over the gastric body.         ASSESSMENT:  Active Hospital Problems    Diagnosis Date Noted    SBO (small bowel obstruction) ([facility] [address] [D72.948] 07/24/2022     Priority: Medium    CKD (chronic kidney disease) [N18.9] 07/24/2022     Priority: Medium    Essential hypertension [I10] 11/17/2015    BPH (benign prostatic hyperplasia) [N40.0] 05/14/2015       77 y.o. male with closed-loop small bowel obstruction  - POD#1 s/p exploratory laparotomy, lysis of adhesions and umbilical hernia repair     Plan:  Diet: N.p.o.  NG tube to low intermittent wall suction  Maintenance IV fluids  IV Zosyn  Fentanyl IV 50 mcg every 2 hours as needed pain control  Heparin DVT prophylaxis  Encourage I-S, deep breathing, cough  Ambulate patient today, out of bed and in chair  Will discontinue Linder today  Monitor and record bowel function    Electronically signed by Roxane Cormier DO  on 7/25/2022 at 5:53 AM

## 2022-07-26 LAB
ABSOLUTE EOS #: 0.14 K/UL (ref 0–0.44)
ABSOLUTE IMMATURE GRANULOCYTE: 0.03 K/UL (ref 0–0.3)
ABSOLUTE LYMPH #: 1.21 K/UL (ref 1.1–3.7)
ABSOLUTE MONO #: 0.6 K/UL (ref 0.1–1.2)
ANION GAP SERPL CALCULATED.3IONS-SCNC: 10 MMOL/L (ref 9–17)
BASOPHILS # BLD: 1 % (ref 0–2)
BASOPHILS ABSOLUTE: 0.05 K/UL (ref 0–0.2)
BUN BLDV-MCNC: 17 MG/DL (ref 8–23)
BUN/CREAT BLD: 14 (ref 9–20)
CALCIUM SERPL-MCNC: 8.8 MG/DL (ref 8.6–10.4)
CHLORIDE BLD-SCNC: 105 MMOL/L (ref 98–107)
CO2: 25 MMOL/L (ref 20–31)
CREAT SERPL-MCNC: 1.22 MG/DL (ref 0.7–1.2)
EOSINOPHILS RELATIVE PERCENT: 2 % (ref 1–4)
GFR AFRICAN AMERICAN: >60 ML/MIN
GFR NON-AFRICAN AMERICAN: 58 ML/MIN
GFR SERPL CREATININE-BSD FRML MDRD: ABNORMAL ML/MIN/{1.73_M2}
GLUCOSE BLD-MCNC: 98 MG/DL (ref 70–99)
HCT VFR BLD CALC: 47.3 % (ref 40.7–50.3)
HEMOGLOBIN: 15.9 G/DL (ref 13–17)
IMMATURE GRANULOCYTES: 0 %
LYMPHOCYTES # BLD: 17 % (ref 24–43)
MCH RBC QN AUTO: 31.4 PG (ref 25.2–33.5)
MCHC RBC AUTO-ENTMCNC: 33.6 G/DL (ref 28.4–34.8)
MCV RBC AUTO: 93.3 FL (ref 82.6–102.9)
MONOCYTES # BLD: 9 % (ref 3–12)
NRBC AUTOMATED: 0 PER 100 WBC
PDW BLD-RTO: 12.8 % (ref 11.8–14.4)
PLATELET # BLD: 130 K/UL (ref 138–453)
PMV BLD AUTO: 11.1 FL (ref 8.1–13.5)
POTASSIUM SERPL-SCNC: 4 MMOL/L (ref 3.7–5.3)
RBC # BLD: 5.07 M/UL (ref 4.21–5.77)
SEG NEUTROPHILS: 71 % (ref 36–65)
SEGMENTED NEUTROPHILS ABSOLUTE COUNT: 4.96 K/UL (ref 1.5–8.1)
SODIUM BLD-SCNC: 140 MMOL/L (ref 135–144)
WBC # BLD: 7 K/UL (ref 3.5–11.3)

## 2022-07-26 PROCEDURE — 99232 SBSQ HOSP IP/OBS MODERATE 35: CPT | Performed by: NURSE PRACTITIONER

## 2022-07-26 PROCEDURE — 6360000002 HC RX W HCPCS

## 2022-07-26 PROCEDURE — 85025 COMPLETE CBC W/AUTO DIFF WBC: CPT

## 2022-07-26 PROCEDURE — APPSS45 APP SPLIT SHARED TIME 31-45 MINUTES: Performed by: NURSE PRACTITIONER

## 2022-07-26 PROCEDURE — 80048 BASIC METABOLIC PNL TOTAL CA: CPT

## 2022-07-26 PROCEDURE — 1200000000 HC SEMI PRIVATE

## 2022-07-26 PROCEDURE — 2500000003 HC RX 250 WO HCPCS

## 2022-07-26 PROCEDURE — 36415 COLL VENOUS BLD VENIPUNCTURE: CPT

## 2022-07-26 RX ADMIN — FENTANYL CITRATE 50 MCG: 50 INJECTION, SOLUTION INTRAMUSCULAR; INTRAVENOUS at 20:05

## 2022-07-26 RX ADMIN — FENTANYL CITRATE 50 MCG: 50 INJECTION, SOLUTION INTRAMUSCULAR; INTRAVENOUS at 10:13

## 2022-07-26 RX ADMIN — HEPARIN SODIUM 5000 UNITS: 5000 INJECTION INTRAVENOUS; SUBCUTANEOUS at 06:02

## 2022-07-26 RX ADMIN — POTASSIUM CHLORIDE, DEXTROSE MONOHYDRATE AND SODIUM CHLORIDE: 150; 5; 450 INJECTION, SOLUTION INTRAVENOUS at 10:45

## 2022-07-26 RX ADMIN — FENTANYL CITRATE 50 MCG: 50 INJECTION, SOLUTION INTRAMUSCULAR; INTRAVENOUS at 04:17

## 2022-07-26 RX ADMIN — POTASSIUM CHLORIDE, DEXTROSE MONOHYDRATE AND SODIUM CHLORIDE: 150; 5; 450 INJECTION, SOLUTION INTRAVENOUS at 02:41

## 2022-07-26 RX ADMIN — HYDRALAZINE HYDROCHLORIDE 10 MG: 20 INJECTION INTRAMUSCULAR; INTRAVENOUS at 15:59

## 2022-07-26 RX ADMIN — HEPARIN SODIUM 5000 UNITS: 5000 INJECTION INTRAVENOUS; SUBCUTANEOUS at 23:03

## 2022-07-26 RX ADMIN — POTASSIUM CHLORIDE, DEXTROSE MONOHYDRATE AND SODIUM CHLORIDE: 150; 5; 450 INJECTION, SOLUTION INTRAVENOUS at 20:05

## 2022-07-26 RX ADMIN — FENTANYL CITRATE 50 MCG: 50 INJECTION, SOLUTION INTRAMUSCULAR; INTRAVENOUS at 14:59

## 2022-07-26 RX ADMIN — HEPARIN SODIUM 5000 UNITS: 5000 INJECTION INTRAVENOUS; SUBCUTANEOUS at 15:59

## 2022-07-26 ASSESSMENT — PAIN DESCRIPTION - DESCRIPTORS
DESCRIPTORS: PRESSURE
DESCRIPTORS: ACHING
DESCRIPTORS: ACHING
DESCRIPTORS: PRESSURE

## 2022-07-26 ASSESSMENT — PAIN - FUNCTIONAL ASSESSMENT
PAIN_FUNCTIONAL_ASSESSMENT: ACTIVITIES ARE NOT PREVENTED
PAIN_FUNCTIONAL_ASSESSMENT: PREVENTS OR INTERFERES SOME ACTIVE ACTIVITIES AND ADLS
PAIN_FUNCTIONAL_ASSESSMENT: ACTIVITIES ARE NOT PREVENTED

## 2022-07-26 ASSESSMENT — PAIN SCALES - GENERAL
PAINLEVEL_OUTOF10: 4
PAINLEVEL_OUTOF10: 8
PAINLEVEL_OUTOF10: 8
PAINLEVEL_OUTOF10: 7
PAINLEVEL_OUTOF10: 7

## 2022-07-26 ASSESSMENT — PAIN DESCRIPTION - FREQUENCY
FREQUENCY: CONTINUOUS
FREQUENCY: CONTINUOUS

## 2022-07-26 ASSESSMENT — PAIN DESCRIPTION - ONSET
ONSET: ON-GOING
ONSET: ON-GOING

## 2022-07-26 ASSESSMENT — PAIN DESCRIPTION - LOCATION
LOCATION: ABDOMEN

## 2022-07-26 ASSESSMENT — PAIN DESCRIPTION - ORIENTATION
ORIENTATION: RIGHT;LOWER
ORIENTATION: LOWER
ORIENTATION: LOWER
ORIENTATION: MID

## 2022-07-26 ASSESSMENT — PAIN DESCRIPTION - PAIN TYPE
TYPE: SURGICAL PAIN
TYPE: ACUTE PAIN;SURGICAL PAIN

## 2022-07-26 NOTE — PROGRESS NOTES
General Surgery:  Daily Progress Note                  PATIENT NAME: Anant Crawford     TODAY'S DATE: 7/26/2022  CC: Postop abdominal pain    SUBJECTIVE:     Pt seen and examined at bedside. No acute events overnight, afebrile, hemodynamically stable, on RA. Did endorse some abdominal pain overnight that is improved this AM.  NG tube in place with 300 cc output/12h. UOP adequate. Denies any nausea. States he is not passing flatus or having any bowel fxn yet. ROS  Gen: no fevers, chills  Card: no chest pain, palpitations  Resp: no SOB, no wheezing  Abd: postop abdominal pain, no nausea or emesis  Neuro: no HA, dizziness, syncope    OBJECTIVE:   VITALS:  /71   Pulse 63   Temp 98.1 °F (36.7 °C) (Oral)   Resp 16   Ht 6' 3\" (1.905 m)   Wt 238 lb (108 kg)   SpO2 90%   BMI 29.75 kg/m²      INTAKE/OUTPUT:      Intake/Output Summary (Last 24 hours) at 7/26/2022 3183  Last data filed at 7/26/2022 7133  Gross per 24 hour   Intake 2587.05 ml   Output 2050 ml   Net 537.05 ml       PHYSICAL EXAM:  General Appearance: awake, alert, oriented, in no acute distress  HEENT:  Normocephalic, atraumatic, mucus membranes moist    NGT  Heart: Heart regular rate and rhythm  Lungs: Breathing Pattern: regular, no distress  Abdomen: Soft, appropriately tender to palpation, no guarding or peritoneal signs  Incision: Dressing in place, clean dry and intact, abdominal binder in place  Extremities: No cyanosis, pitting edema, rashes noted. Skin: Skin color, texture, turgor normal. No rashes or lesions.       Data:  CBC with Differential:    Lab Results   Component Value Date/Time    WBC 9.6 07/25/2022 05:56 AM    RBC 4.62 07/25/2022 05:56 AM    HGB 14.6 07/25/2022 05:56 AM    HCT 43.0 07/25/2022 05:56 AM     07/25/2022 05:56 AM    MCV 93.1 07/25/2022 05:56 AM    MCH 31.6 07/25/2022 05:56 AM    MCHC 34.0 07/25/2022 05:56 AM    RDW 12.4 07/25/2022 05:56 AM    LYMPHOPCT 7 07/25/2022 05:56 AM    MONOPCT 6 07/25/2022 05:56 AM    BASOPCT 0 07/25/2022 05:56 AM    MONOSABS 0.58 07/25/2022 05:56 AM    LYMPHSABS 0.67 07/25/2022 05:56 AM    EOSABS 0.00 07/25/2022 05:56 AM    BASOSABS 0.00 07/25/2022 05:56 AM    DIFFTYPE NOT REPORTED 11/18/2021 03:55 PM     CMP:    Lab Results   Component Value Date/Time     07/25/2022 05:56 AM    K 4.3 07/25/2022 05:56 AM     07/25/2022 05:56 AM    CO2 25 07/25/2022 05:56 AM    BUN 23 07/25/2022 05:56 AM    CREATININE 1.38 07/25/2022 05:56 AM    GFRAA >60 07/25/2022 05:56 AM    LABGLOM 50 07/25/2022 05:56 AM    GLUCOSE 146 07/25/2022 05:56 AM    PROT 5.8 07/25/2022 05:56 AM    LABALBU 3.7 07/25/2022 05:56 AM    CALCIUM 8.4 07/25/2022 05:56 AM    BILITOT 1.02 07/25/2022 05:56 AM    ALKPHOS 50 07/25/2022 05:56 AM    AST 11 07/25/2022 05:56 AM    ALT 10 07/25/2022 05:56 AM       Radiology Review:  CT ABDOMEN PELVIS W IV CONTRAST Additional Contrast? None    Result Date: 7/24/2022  1. Small-bowel obstruction with closed loop configuration. Mild mesenteric edema and trace abdominopelvic ascites. 2.  Partially visualized clustered nodular opacities in the central right lower lobe. This favors an inflammatory process. Follow-up with chest CT is recommended as this is only partially visualized. 3.  Bilateral punctate nephrolithiasis. 4.  Diverticulosis. XR CHEST PORTABLE    Result Date: 7/24/2022  No acute airspace disease identified. XR ABDOMEN FOR NG/OG/NE TUBE PLACEMENT    Result Date: 7/24/2022  Enteric tube tip and side port project over the gastric body.         ASSESSMENT:  Active Hospital Problems    Diagnosis Date Noted    SBO (small bowel obstruction) ([facility] [address] [F96.007] 07/24/2022     Priority: Medium    CKD (chronic kidney disease) [N18.9] 07/24/2022     Priority: Medium    Essential hypertension [I10] 11/17/2015    BPH (benign prostatic hyperplasia) [N40.0] 05/14/2015       77 y.o. male with closed-loop small bowel obstruction  - POD#2 s/p exploratory laparotomy, lysis of adhesions and umbilical hernia repair     Plan:  Diet: NPO for now, mIVF  Clamp NGT this morning. Possible removal later today if tolerating clamp. Monitor and record bowel fxn  IV Zosyn  Pain control, limit opioids as able   Heparin DVT prophylaxis  Encourage IS, deep breathing   Ambulate patient, out of bed and in chair  Strict I/O's  Dressing changed at bedside this AM. Change ABD as needed to midline incision. Ok to shower. Mann Lawson, DO  General Surgery PGY-4    Attending Physician Statement  I have discussed the case, including pertinent history and exam findings with the resident. I agree with the assessment, plan and orders as documented by the resident.      Await return of bowel function

## 2022-07-26 NOTE — PROGRESS NOTES
Physician Progress Note      Kendell HOLMAN #:                  [id number]  :                       1945  ADMIT DATE:       2022 1:36 AM  DISCH DATE:  RESPONDING  PROVIDER #:        Alia Ivan MD        QUERY TEXT:    Stage of Chronic Kidney Disease: Please provide further specificity, if known. Clinical indicators include: bun, creatinine, ckd (chronic kidney disease,   gfr, brain natriuretic peptide, pro-bnp, probnp  Options provided:  -- Chronic kidney disease stage 1  -- Chronic kidney disease stage 2  -- Chronic kidney disease stage 3  -- Chronic kidney disease stage 3a  -- Chronic kidney disease stage 3b  -- Chronic kidney disease stage 4  -- Chronic kidney disease stage 5  -- Chronic kidney disease stage 5, requiring dialysis  -- End stage renal disease  -- Other - I will add my own diagnosis  -- Disagree - Not applicable / Not valid  -- Disagree - Clinically Unable to determine / Unknown        PROVIDER RESPONSE TEXT:    The patient has chronic kidney disease stage 3a.       Electronically signed by:  Alia Ivan MD 2022 11:08 AM

## 2022-07-26 NOTE — PROGRESS NOTES
[facility]  Office: [address], DO, Dannie Nagelpadmaja, DO, Della New, DO, Celeste Joe Blood, DO, Noel Roe MD, Fifi Manning MD, Tc Soares MD, Dinaa Collins MD,  Alexis Dickerson MD, Curt Spencer MD, Tima Ramirez, DO, Julio Cesar Mehta MD,  Elisha Denise MD, Keri Patel MD, Lencho Germain DO, Cesar George MD, Bernadine Farris MD, Karel Herbert MD, Camila Foley DO, Francis Stockton MD, Iram Rebolledo MD, Todd Caraballo, CNP,  Bekah Bourgeois, CNP, Sunita Forde, CNP, Fred Puri, CNP, Harjit Corral PA-C, Cleone Denver, [facility], Annie Gonsales, CNP, Giuseppe Pitts, CNP, Doug Pollock, CNP, Kareem Beltran, CNP, Ching Alvarenga, CNP, Jeannine Baker, CNS, Chandler Blakely, [facility], Fabrizio Luther, CNP, Puma Elias, CNP, Cadence Fitzgerald, [facility]           [address] [facility]    Progress Note    7/26/2022    1:31 PM    Name:   Darci Jaime  MRN:     [id number]     Acct:      [de-identified]   Room:   2006/2006-02  IP Day:  2  Admit Date:  7/24/2022  1:36 AM    PCP:   ROBB Palacios CNP  Code Status:  Full Code    Subjective:     C/C:   Chief Complaint   Patient presents with    Abdominal Pain     Upper abdominal pain, denies n/v/d. Started around noon     Interval History Status: improved. NG tube clamped this morning by general surgery. Patient has been encouraged to be out of bed and ambulating. Patient reports that he is feeling significant cramping and bowel gas movement. He has not yet passing flatus however he feels a sensation from time to time. Patient's blood pressure is mildly elevated this morning, this has been stable off of treatment for the past 48 hours. If his blood pressure continues to increase he will require antihypertensive regimen. Renal function is improving. Brief History:     7/24 - 7/25 -  Darci Jaime is a 77 y.o.  Non- / non  male who presents with Abdominal Pain (Upper abdominal pain, denies n/v/d. Started around noon)   and is admitted to the hospital for the management of SBO (small bowel obstruction) ([address] Patient started having abdominal pressure and nausea yesterday afternoon. No history of abdominal surgeries, although patient says that he did have a colonoscopy earlier this month. He says polyps were seen on colonoscopy. He has PMH of HTN and BPH. While in ED, CT abdomen revealed SBO, mild mesenteric edema and trace abdominopelvic ascites, diverticulosis, bilateral kidney stones. General surgery was consulted. 7/26 -NG clamped. Renal function improved. Blood pressure mildly elevated. Diet advancement and NG removal at surgery discretion. Review of Systems:     Constitutional:  negative for chills, fevers, sweats  Respiratory:  negative for cough, dyspnea on exertion, shortness of breath, wheezing  Cardiovascular:  negative for chest pain, chest pressure/discomfort, lower extremity edema, palpitations  Gastrointestinal:  negative for abdominal pain, constipation, diarrhea, nausea, vomiting  Neurological:  negative for dizziness, headache    Medications:      Allergies:  No Known Allergies    Current Meds:   Scheduled Meds:    sodium chloride  80 mL IntraVENous Once    sodium chloride flush  5-40 mL IntraVENous 2 times per day    heparin (porcine)  5,000 Units SubCUTAneous 3 times per day     Continuous Infusions:    dextrose 5% and 0.45% NaCl with KCl 20 mEq 125 mL/hr at 07/26/22 1045    sodium chloride       PRN Meds: phenol, sodium chloride flush, sodium chloride flush, sodium chloride, [DISCONTINUED] potassium chloride **OR** [DISCONTINUED] potassium alternative oral replacement **OR** potassium chloride, magnesium sulfate, [DISCONTINUED] ondansetron **OR** ondansetron, hydrALAZINE, fentanNYL    Data:     Past Medical History:   has a past medical history of Diverticulosis, DJD (degenerative joint disease), Erectile dysfunction, Hemorrhoids, results found for: CULTURE    Radiology:  CT ABDOMEN PELVIS W IV CONTRAST Additional Contrast? None    Result Date: 7/24/2022  1. Small-bowel obstruction with closed loop configuration. Mild mesenteric edema and trace abdominopelvic ascites. 2.  Partially visualized clustered nodular opacities in the central right lower lobe. This favors an inflammatory process. Follow-up with chest CT is recommended as this is only partially visualized. 3.  Bilateral punctate nephrolithiasis. 4.  Diverticulosis. XR CHEST PORTABLE    Result Date: 7/24/2022  No acute airspace disease identified. XR ABDOMEN FOR NG/OG/NE TUBE PLACEMENT    Result Date: 7/24/2022  Enteric tube tip and side port project over the gastric body.        Physical Examination:        General appearance:  alert, cooperative and no distress  Mental Status:  oriented to person, place and time and normal affect  Lungs:  clear to auscultation bilaterally, normal effort  Heart:  regular rate and rhythm, no murmur  Abdomen:  soft, nontender, nondistended, normal bowel sounds, no masses, hepatomegaly, splenomegaly  Extremities:  no edema, redness, tenderness in the calves  Skin:  no gross lesions, rashes, induration    Assessment:        Hospital Problems             Last Modified POA    * (Principal) SBO (small bowel obstruction) ([address] 7/24/2022 Yes    CKD (chronic kidney disease) 7/24/2022 Yes    BPH (benign prostatic hyperplasia) (Chronic) 7/24/2022 Yes    Essential hypertension (Chronic) 7/24/2022 Yes       Plan:        Small bowel obstruction, seen postoperative  NG clamped per surgery this morning, oral intake trial and NG removal at their direction  Continue IV fluids and de-escalate narcotic pain control as able  Encourage out of bed ambulation  CKD  Avoid nephrotoxic medications and continue IV hydration  Essential hypertension  Blood pressure has been stable off therapy for the past 48 hours, elevated reading this morning, if this trend continues we will start antihypertensive regimen  BPH  Continue with outpatient follow-up    ROBB Wiggins NP  7/26/2022  1:31 PM

## 2022-07-26 NOTE — PLAN OF CARE
Problem: Discharge Planning  Goal: Discharge to home or other facility with appropriate resources  Outcome: Progressing Towards Goal  Flowsheets (Taken 7/26/2022 1439)  Discharge to home or other facility with appropriate resources:   Identify barriers to discharge with patient and caregiver   Identify discharge learning needs (meds, wound care, etc)   Refer to discharge planning if patient needs post-hospital services based on physician order or complex needs related to functional status, cognitive ability or social support system     Problem: Pain  Goal: Verbalizes/displays adequate comfort level or baseline comfort level  Outcome: Progressing Towards Goal     Problem: Safety - Adult  Goal: Free from fall injury  Outcome: Progressing Towards Goal  Flowsheets (Taken 7/26/2022 1439)  Free From Fall Injury:   Instruct family/caregiver on patient safety   Based on caregiver fall risk screen, instruct family/caregiver to ask for assistance with transferring infant if caregiver noted to have fall risk factors     Problem: ABCDS Injury Assessment  Goal: Absence of physical injury  Outcome: Progressing Towards Goal  Flowsheets (Taken 7/26/2022 1439)  Absence of Physical Injury: Implement safety measures based on patient assessment

## 2022-07-27 LAB
ABSOLUTE EOS #: 0.18 K/UL (ref 0–0.44)
ABSOLUTE IMMATURE GRANULOCYTE: 0.03 K/UL (ref 0–0.3)
ABSOLUTE LYMPH #: 0.96 K/UL (ref 1.1–3.7)
ABSOLUTE MONO #: 0.54 K/UL (ref 0.1–1.2)
ANION GAP SERPL CALCULATED.3IONS-SCNC: 8 MMOL/L (ref 9–17)
BASOPHILS # BLD: 1 % (ref 0–2)
BASOPHILS ABSOLUTE: 0.04 K/UL (ref 0–0.2)
BUN BLDV-MCNC: 14 MG/DL (ref 8–23)
BUN/CREAT BLD: 13 (ref 9–20)
CALCIUM SERPL-MCNC: 8.7 MG/DL (ref 8.6–10.4)
CHLORIDE BLD-SCNC: 106 MMOL/L (ref 98–107)
CO2: 25 MMOL/L (ref 20–31)
CREAT SERPL-MCNC: 1.11 MG/DL (ref 0.7–1.2)
EOSINOPHILS RELATIVE PERCENT: 4 % (ref 1–4)
GFR AFRICAN AMERICAN: >60 ML/MIN
GFR NON-AFRICAN AMERICAN: >60 ML/MIN
GFR SERPL CREATININE-BSD FRML MDRD: ABNORMAL ML/MIN/{1.73_M2}
GLUCOSE BLD-MCNC: 108 MG/DL (ref 70–99)
HCT VFR BLD CALC: 42.8 % (ref 40.7–50.3)
HEMOGLOBIN: 14.5 G/DL (ref 13–17)
IMMATURE GRANULOCYTES: 1 %
LYMPHOCYTES # BLD: 19 % (ref 24–43)
MCH RBC QN AUTO: 31.4 PG (ref 25.2–33.5)
MCHC RBC AUTO-ENTMCNC: 33.9 G/DL (ref 28.4–34.8)
MCV RBC AUTO: 92.6 FL (ref 82.6–102.9)
MONOCYTES # BLD: 11 % (ref 3–12)
NRBC AUTOMATED: 0 PER 100 WBC
PDW BLD-RTO: 12.2 % (ref 11.8–14.4)
PLATELET # BLD: 128 K/UL (ref 138–453)
PMV BLD AUTO: 11.1 FL (ref 8.1–13.5)
POTASSIUM SERPL-SCNC: 4.3 MMOL/L (ref 3.7–5.3)
RBC # BLD: 4.62 M/UL (ref 4.21–5.77)
SEG NEUTROPHILS: 65 % (ref 36–65)
SEGMENTED NEUTROPHILS ABSOLUTE COUNT: 3.23 K/UL (ref 1.5–8.1)
SODIUM BLD-SCNC: 139 MMOL/L (ref 135–144)
WBC # BLD: 5 K/UL (ref 3.5–11.3)

## 2022-07-27 PROCEDURE — 80048 BASIC METABOLIC PNL TOTAL CA: CPT

## 2022-07-27 PROCEDURE — 6370000000 HC RX 637 (ALT 250 FOR IP): Performed by: NURSE PRACTITIONER

## 2022-07-27 PROCEDURE — 6360000002 HC RX W HCPCS

## 2022-07-27 PROCEDURE — 1200000000 HC SEMI PRIVATE

## 2022-07-27 PROCEDURE — 2580000003 HC RX 258

## 2022-07-27 PROCEDURE — 36415 COLL VENOUS BLD VENIPUNCTURE: CPT

## 2022-07-27 PROCEDURE — 2500000003 HC RX 250 WO HCPCS

## 2022-07-27 PROCEDURE — 6370000000 HC RX 637 (ALT 250 FOR IP)

## 2022-07-27 PROCEDURE — APPSS45 APP SPLIT SHARED TIME 31-45 MINUTES: Performed by: NURSE PRACTITIONER

## 2022-07-27 PROCEDURE — 99232 SBSQ HOSP IP/OBS MODERATE 35: CPT | Performed by: NURSE PRACTITIONER

## 2022-07-27 PROCEDURE — 85025 COMPLETE CBC W/AUTO DIFF WBC: CPT

## 2022-07-27 RX ORDER — LISINOPRIL 10 MG/1
10 TABLET ORAL DAILY
Status: DISCONTINUED | OUTPATIENT
Start: 2022-07-28 | End: 2022-07-28 | Stop reason: HOSPADM

## 2022-07-27 RX ORDER — LISINOPRIL 5 MG/1
5 TABLET ORAL DAILY
Status: DISCONTINUED | OUTPATIENT
Start: 2022-07-28 | End: 2022-07-27

## 2022-07-27 RX ORDER — ACETAMINOPHEN 325 MG/1
650 TABLET ORAL EVERY 4 HOURS PRN
Status: DISCONTINUED | OUTPATIENT
Start: 2022-07-27 | End: 2022-07-28 | Stop reason: HOSPADM

## 2022-07-27 RX ORDER — OXYCODONE HYDROCHLORIDE 5 MG/1
5 TABLET ORAL EVERY 6 HOURS PRN
Status: DISCONTINUED | OUTPATIENT
Start: 2022-07-27 | End: 2022-07-28 | Stop reason: HOSPADM

## 2022-07-27 RX ADMIN — POTASSIUM CHLORIDE, DEXTROSE MONOHYDRATE AND SODIUM CHLORIDE: 150; 5; 450 INJECTION, SOLUTION INTRAVENOUS at 19:53

## 2022-07-27 RX ADMIN — HEPARIN SODIUM 5000 UNITS: 5000 INJECTION INTRAVENOUS; SUBCUTANEOUS at 15:02

## 2022-07-27 RX ADMIN — HEPARIN SODIUM 5000 UNITS: 5000 INJECTION INTRAVENOUS; SUBCUTANEOUS at 22:19

## 2022-07-27 RX ADMIN — ACETAMINOPHEN 650 MG: 325 TABLET, FILM COATED ORAL at 11:09

## 2022-07-27 RX ADMIN — POTASSIUM CHLORIDE, DEXTROSE MONOHYDRATE AND SODIUM CHLORIDE: 150; 5; 450 INJECTION, SOLUTION INTRAVENOUS at 11:13

## 2022-07-27 RX ADMIN — ACETAMINOPHEN 650 MG: 325 TABLET, FILM COATED ORAL at 19:05

## 2022-07-27 RX ADMIN — SODIUM CHLORIDE, PRESERVATIVE FREE 10 ML: 5 INJECTION INTRAVENOUS at 07:46

## 2022-07-27 RX ADMIN — OXYCODONE 5 MG: 5 TABLET ORAL at 19:53

## 2022-07-27 RX ADMIN — HEPARIN SODIUM 5000 UNITS: 5000 INJECTION INTRAVENOUS; SUBCUTANEOUS at 07:39

## 2022-07-27 RX ADMIN — POTASSIUM CHLORIDE, DEXTROSE MONOHYDRATE AND SODIUM CHLORIDE: 150; 5; 450 INJECTION, SOLUTION INTRAVENOUS at 03:50

## 2022-07-27 RX ADMIN — FENTANYL CITRATE 50 MCG: 50 INJECTION, SOLUTION INTRAMUSCULAR; INTRAVENOUS at 01:04

## 2022-07-27 RX ADMIN — HYDRALAZINE HYDROCHLORIDE 10 MG: 20 INJECTION INTRAMUSCULAR; INTRAVENOUS at 17:13

## 2022-07-27 RX ADMIN — FENTANYL CITRATE 50 MCG: 50 INJECTION, SOLUTION INTRAMUSCULAR; INTRAVENOUS at 07:44

## 2022-07-27 ASSESSMENT — PAIN DESCRIPTION - FREQUENCY
FREQUENCY: INTERMITTENT
FREQUENCY: CONTINUOUS

## 2022-07-27 ASSESSMENT — PAIN DESCRIPTION - PAIN TYPE
TYPE: SURGICAL PAIN
TYPE: SURGICAL PAIN

## 2022-07-27 ASSESSMENT — PAIN DESCRIPTION - DESCRIPTORS
DESCRIPTORS: TENDER
DESCRIPTORS: BURNING
DESCRIPTORS: PRESSURE
DESCRIPTORS: ACHING

## 2022-07-27 ASSESSMENT — PAIN DESCRIPTION - LOCATION
LOCATION: HEAD
LOCATION: ABDOMEN

## 2022-07-27 ASSESSMENT — PAIN SCALES - GENERAL
PAINLEVEL_OUTOF10: 3
PAINLEVEL_OUTOF10: 3
PAINLEVEL_OUTOF10: 5
PAINLEVEL_OUTOF10: 7
PAINLEVEL_OUTOF10: 7
PAINLEVEL_OUTOF10: 6

## 2022-07-27 ASSESSMENT — PAIN DESCRIPTION - ORIENTATION
ORIENTATION: LOWER
ORIENTATION: MID;LOWER
ORIENTATION: LOWER

## 2022-07-27 ASSESSMENT — PAIN - FUNCTIONAL ASSESSMENT
PAIN_FUNCTIONAL_ASSESSMENT: ACTIVITIES ARE NOT PREVENTED
PAIN_FUNCTIONAL_ASSESSMENT: ACTIVITIES ARE NOT PREVENTED

## 2022-07-27 ASSESSMENT — PAIN DESCRIPTION - ONSET
ONSET: ON-GOING
ONSET: GRADUAL

## 2022-07-27 NOTE — PLAN OF CARE
Problem: Discharge Planning  Goal: Discharge to home or other facility with appropriate resources  Outcome: Progressing  Flowsheets (Taken 7/27/2022 3393)  Discharge to home or other facility with appropriate resources:   Identify barriers to discharge with patient and caregiver   Refer to discharge planning if patient needs post-hospital services based on physician order or complex needs related to functional status, cognitive ability or social support system     Problem: Pain  Goal: Verbalizes/displays adequate comfort level or baseline comfort level  Outcome: Progressing     Problem: Safety - Adult  Goal: Free from fall injury  Outcome: Progressing     Problem: ABCDS Injury Assessment  Goal: Absence of physical injury  Outcome: Progressing

## 2022-07-27 NOTE — PROGRESS NOTES
[facility]  Office: [address], DO, Porfirio Bobbi, DO, Brisa Westfall, DO, Morena Mena, DO, Gonzalo Diana MD, Chilango Tapia MD, Usman Oro MD, Jannie Germain MD,  Jerrica Estrella MD, Dung Bermeo MD, Aston Christian, DO, Mike Thompson MD,  Abe Mohs, MD, Adilene Mcclellan MD, Boy Hoskins, DO, Kallie Donohue MD, Violeta Mcgarry MD, Kathy Boyd MD, Sheffield Officer, DO, Marilyn Weaver MD, Carrington Lewis MD, Cristina Tate, CNP,  Bhavin Peck, CNP, Gearline Precise, CNP, Belen Wen, CNP, Minnie Long PA-C, Clark Bergman [facility], Mio Sanchez, CNP, Charline Velez, CNP, Desiree Romeo, CNP, Feliciano Gonzalez, CNP, Amor Mcnulty, CNP, Malick Marcos, CNS, Melina Hardy, [facility], Melvin Mitchell, CNP, Galina Fam, CNP, Jennifer Grubbs, [facility]    Progress Note    7/27/2022    11:10 AM    Name:   Alys Dance  MRN:     [id number]     Acct:      [de-identified]   Room:   2006/2006-02  IP Day:  3  Admit Date:  7/24/2022  1:36 AM    PCP:   ROBB Covington CNP  Code Status:  Full Code    Subjective:     C/C:   Chief Complaint   Patient presents with    Abdominal Pain     Upper abdominal pain, denies n/v/d. Started around noon     Interval History Status: improved. NG tube removed overnight. Patient reports only minimal abdominal discomfort. Very minimal flatus. No bowel movements. Diet advancement per general surgery. Brief History:     7/24 - 7/25 -  Alys Dance is a 77 y.o. Non- / non  male who presents with Abdominal Pain (Upper abdominal pain, denies n/v/d. Started around noon)   and is admitted to the hospital for the management of SBO (small bowel obstruction) ([facility] [address] Patient started having abdominal pressure and nausea yesterday afternoon. No history of abdominal surgeries, although patient says that he did have a colonoscopy earlier this month.  He says polyps were seen on colonoscopy. He has PMH of HTN and BPH. While in ED, CT abdomen revealed SBO, mild mesenteric edema and trace abdominopelvic ascites, diverticulosis, bilateral kidney stones. General surgery was consulted. 7/26 -NG clamped. Renal function improved. Blood pressure mildly elevated. Diet advancement and NG removal at surgery discretion. 7/27 - NG tube removed overnight. Patient reports only minimal abdominal discomfort. Very minimal flatus. No bowel movements. Diet advancement per general surgery. Review of Systems:     Constitutional:  negative for chills, fevers, sweats  Respiratory:  negative for cough, dyspnea on exertion, shortness of breath, wheezing  Cardiovascular:  negative for chest pain, chest pressure/discomfort, lower extremity edema, palpitations  Gastrointestinal:  minimal abdominal pain, no constipation, diarrhea, nausea, vomiting. No gas  Neurological:  negative for dizziness, headache    Medications: Allergies:  No Known Allergies    Current Meds:   Scheduled Meds:    sodium chloride  80 mL IntraVENous Once    sodium chloride flush  5-40 mL IntraVENous 2 times per day    heparin (porcine)  5,000 Units SubCUTAneous 3 times per day     Continuous Infusions:    dextrose 5% and 0.45% NaCl with KCl 20 mEq 125 mL/hr at 07/27/22 0746    sodium chloride       PRN Meds: acetaminophen, phenol, sodium chloride flush, sodium chloride flush, sodium chloride, [DISCONTINUED] potassium chloride **OR** [DISCONTINUED] potassium alternative oral replacement **OR** potassium chloride, magnesium sulfate, [DISCONTINUED] ondansetron **OR** ondansetron, hydrALAZINE, fentanNYL    Data:     Past Medical History:   has a past medical history of Diverticulosis, DJD (degenerative joint disease), Erectile dysfunction, Hemorrhoids, Hernia, Hypertension, Osteoarthritis, Sleep apnea, and Tubular adenoma of colon. Social History:   reports that he has never smoked.  He has never used Partially visualized clustered nodular opacities in the central right lower lobe. This favors an inflammatory process. Follow-up with chest CT is recommended as this is only partially visualized. 3.  Bilateral punctate nephrolithiasis. 4.  Diverticulosis. XR CHEST PORTABLE    Result Date: 7/24/2022  No acute airspace disease identified. XR ABDOMEN FOR NG/OG/NE TUBE PLACEMENT    Result Date: 7/24/2022  Enteric tube tip and side port project over the gastric body.        Physical Examination:        General appearance:  alert, cooperative and no distress  Mental Status:  oriented to person, place and time and normal affect  Lungs:  clear to auscultation bilaterally, normal effort  Heart:  regular rate and rhythm, no murmur  Abdomen:  soft, nontender, nondistended, normal bowel sounds, no masses, hepatomegaly, splenomegaly  Extremities:  no edema, redness, tenderness in the calves  Skin:  no gross lesions, rashes, induration    Assessment:        Hospital Problems             Last Modified POA    * (Principal) SBO (small bowel obstruction) ([address] 7/24/2022 Yes    CKD (chronic kidney disease) 7/24/2022 Yes    BPH (benign prostatic hyperplasia) (Chronic) 7/24/2022 Yes    Essential hypertension (Chronic) 7/24/2022 Yes       Plan:        Small bowel obstruction, seen postoperative  NG removed, oral intake at their direction  Continue IV fluids and de-escalate narcotic pain control as able  Encourage out of bed ambulation  CKD - function better than baseline  Avoid nephrotoxic medications and continue IV hydration  Essential hypertension  Continue lisinopril but increase dosage due to persistent hypertension  BPH  Continue with outpatient follow-up    ROBB Conner NP  7/27/2022  11:10 AM

## 2022-07-27 NOTE — PLAN OF CARE
Problem: Discharge Planning  Goal: Discharge to home or other facility with appropriate resources  7/27/2022 0147 by Shirin Cheung RN  Outcome: Progressing Towards Goal  7/26/2022 1439 by George Sharif RN  Outcome: Progressing Towards Goal  Flowsheets (Taken 7/26/2022 1439)  Discharge to home or other facility with appropriate resources:   Identify barriers to discharge with patient and caregiver   Identify discharge learning needs (meds, wound care, etc)   Refer to discharge planning if patient needs post-hospital services based on physician order or complex needs related to functional status, cognitive ability or social support system     Problem: Pain  Goal: Verbalizes/displays adequate comfort level or baseline comfort level  7/27/2022 0147 by Shirin Cheung RN  Outcome: Progressing Towards Goal  7/26/2022 1439 by George Sharif RN  Outcome: Progressing Towards Goal     Problem: Safety - Adult  Goal: Free from fall injury  7/27/2022 0147 by Shirin Cheung RN  Outcome: Progressing Towards Goal  Flowsheets (Taken 7/26/2022 2000)  Free From Fall Injury: Instruct family/caregiver on patient safety  7/26/2022 1439 by George Sharif RN  Outcome: Progressing Towards Goal  Flowsheets (Taken 7/26/2022 1439)  Free From Fall Injury:   Instruct family/caregiver on patient safety   Based on caregiver fall risk screen, instruct family/caregiver to ask for assistance with transferring infant if caregiver noted to have fall risk factors     Problem: ABCDS Injury Assessment  Goal: Absence of physical injury  7/27/2022 0147 by Shirin Cheung RN  Outcome: Progressing Towards Goal  Flowsheets (Taken 7/26/2022 2000)  Absence of Physical Injury: Implement safety measures based on patient assessment  7/26/2022 1439 by George Sharif RN  Outcome: Progressing Towards Goal  Flowsheets (Taken 7/26/2022 1439)  Absence of Physical Injury: Implement safety measures based on patient assessment

## 2022-07-27 NOTE — PROGRESS NOTES
09:42 AM    MONOSABS 0.60 07/26/2022 09:42 AM    LYMPHSABS 1.21 07/26/2022 09:42 AM    EOSABS 0.14 07/26/2022 09:42 AM    BASOSABS 0.05 07/26/2022 09:42 AM    DIFFTYPE NOT REPORTED 11/18/2021 03:55 PM     CMP:    Lab Results   Component Value Date/Time     07/26/2022 09:42 AM    K 4.0 07/26/2022 09:42 AM     07/26/2022 09:42 AM    CO2 25 07/26/2022 09:42 AM    BUN 17 07/26/2022 09:42 AM    CREATININE 1.22 07/26/2022 09:42 AM    GFRAA >60 07/26/2022 09:42 AM    LABGLOM 58 07/26/2022 09:42 AM    GLUCOSE 98 07/26/2022 09:42 AM    PROT 5.8 07/25/2022 05:56 AM    LABALBU 3.7 07/25/2022 05:56 AM    CALCIUM 8.8 07/26/2022 09:42 AM    BILITOT 1.02 07/25/2022 05:56 AM    ALKPHOS 50 07/25/2022 05:56 AM    AST 11 07/25/2022 05:56 AM    ALT 10 07/25/2022 05:56 AM       Radiology Review:  CT ABDOMEN PELVIS W IV CONTRAST Additional Contrast? None    Result Date: 7/24/2022  1. Small-bowel obstruction with closed loop configuration. Mild mesenteric edema and trace abdominopelvic ascites. 2.  Partially visualized clustered nodular opacities in the central right lower lobe. This favors an inflammatory process. Follow-up with chest CT is recommended as this is only partially visualized. 3.  Bilateral punctate nephrolithiasis. 4.  Diverticulosis. XR CHEST PORTABLE    Result Date: 7/24/2022  No acute airspace disease identified. XR ABDOMEN FOR NG/OG/NE TUBE PLACEMENT    Result Date: 7/24/2022  Enteric tube tip and side port project over the gastric body.         ASSESSMENT:  Active Hospital Problems    Diagnosis Date Noted    SBO (small bowel obstruction) ([facility] [address] [Z47.297] 07/24/2022     Priority: Medium    CKD (chronic kidney disease) [N18.9] 07/24/2022     Priority: Medium    Essential hypertension [I10] 11/17/2015    BPH (benign prostatic hyperplasia) [N40.0] 05/14/2015       77 y.o. male with closed-loop small bowel obstruction  - POD#3 s/p exploratory laparotomy, lysis of adhesions and umbilical hernia repair Plan:  Diet: NPO for now, mIVF, will discuss advancement to clear liquid diet  Monitor and record bowel fxn  Pain control, limit opioids as able, okay to transition to p.o. pain regimen once on clear liquid diet  Heparin DVT prophylaxis  Encourage IS, deep breathing   Ambulate patient, out of bed and in chair  Strict I/O's  ABD as needed to midline incision. Ok to shower. Magdalene Hernandez,   General surgery, PGY 2    General Surgery Attending Attestation Note    Patient was seen and examined. I agree with the above resident's exam, assessment and plan.      Doing well  No flatus yet  Tolerating clears  Continue ambulation  Once he passes flatus we can advance to [address], [address], [place]  Office: [phone]  After Hours: Please call answering service

## 2022-07-28 VITALS
HEIGHT: 75 IN | RESPIRATION RATE: 18 BRPM | SYSTOLIC BLOOD PRESSURE: 152 MMHG | OXYGEN SATURATION: 93 % | TEMPERATURE: 98.2 F | WEIGHT: 230 LBS | DIASTOLIC BLOOD PRESSURE: 78 MMHG | BODY MASS INDEX: 28.6 KG/M2 | HEART RATE: 77 BPM

## 2022-07-28 PROBLEM — K56.609 SBO (SMALL BOWEL OBSTRUCTION) (HCC): Status: RESOLVED | Noted: 2022-07-24 | Resolved: 2022-07-28

## 2022-07-28 PROCEDURE — 6370000000 HC RX 637 (ALT 250 FOR IP)

## 2022-07-28 PROCEDURE — 6360000002 HC RX W HCPCS

## 2022-07-28 PROCEDURE — 2500000003 HC RX 250 WO HCPCS

## 2022-07-28 PROCEDURE — 99239 HOSP IP/OBS DSCHRG MGMT >30: CPT | Performed by: NURSE PRACTITIONER

## 2022-07-28 PROCEDURE — 6370000000 HC RX 637 (ALT 250 FOR IP): Performed by: NURSE PRACTITIONER

## 2022-07-28 RX ORDER — OXYCODONE HYDROCHLORIDE 5 MG/1
5 TABLET ORAL EVERY 12 HOURS PRN
Qty: 6 TABLET | Refills: 0 | Status: SHIPPED | OUTPATIENT
Start: 2022-07-28 | End: 2022-07-28 | Stop reason: SDUPTHER

## 2022-07-28 RX ORDER — OXYCODONE HYDROCHLORIDE 5 MG/1
5 TABLET ORAL EVERY 12 HOURS PRN
Qty: 6 TABLET | Refills: 0 | Status: SHIPPED | OUTPATIENT
Start: 2022-07-28 | End: 2022-07-31

## 2022-07-28 RX ADMIN — HEPARIN SODIUM 5000 UNITS: 5000 INJECTION INTRAVENOUS; SUBCUTANEOUS at 15:17

## 2022-07-28 RX ADMIN — LISINOPRIL 10 MG: 10 TABLET ORAL at 07:57

## 2022-07-28 RX ADMIN — OXYCODONE 5 MG: 5 TABLET ORAL at 02:51

## 2022-07-28 RX ADMIN — ACETAMINOPHEN 650 MG: 325 TABLET, FILM COATED ORAL at 06:28

## 2022-07-28 RX ADMIN — HEPARIN SODIUM 5000 UNITS: 5000 INJECTION INTRAVENOUS; SUBCUTANEOUS at 06:29

## 2022-07-28 RX ADMIN — POTASSIUM CHLORIDE, DEXTROSE MONOHYDRATE AND SODIUM CHLORIDE: 150; 5; 450 INJECTION, SOLUTION INTRAVENOUS at 06:30

## 2022-07-28 ASSESSMENT — PAIN DESCRIPTION - LOCATION
LOCATION: ABDOMEN
LOCATION: NECK

## 2022-07-28 ASSESSMENT — PAIN DESCRIPTION - ORIENTATION: ORIENTATION: MID

## 2022-07-28 ASSESSMENT — PAIN SCALES - GENERAL
PAINLEVEL_OUTOF10: 7
PAINLEVEL_OUTOF10: 4
PAINLEVEL_OUTOF10: 0

## 2022-07-28 ASSESSMENT — PAIN DESCRIPTION - DESCRIPTORS
DESCRIPTORS: ACHING
DESCRIPTORS: BURNING

## 2022-07-28 NOTE — DISCHARGE SUMMARY
[facility]  Office: [address], DO, Nimisha Gill, DO, Yanet Maryellen, DO, Lynda Jersey Mena, DO, Nadja Gaona MD, Nico Gaxiola MD, Rancho Zhou MD, Sunny Hebert MD,  Smita Aguirre MD, Rafal Raymond MD, Mily Carter, DO, Jessica Acosta MD,  Josh Blanc MD, Marlene Pablo MD, Enmanuel Burton DO, Rajesh Gusman MD, Nette Godinez MD, Rhiannon Beatty MD, Winston Griffin DO, Alban Vale MD, Rosy Schirmer, MD, Claudette Coffman, CNP,  Jaida Gaitan, CNP, Dorene Holt CNP, Pradip Pimentel CNP, Anahy Salmon PA-C, Adelia Bell [facility], Kell Fragoso, CNP, Julia Jones, CNP, Esme Scott, CNP, Victor M Nieto, CNP, Tosin Frost, CNP, Av Turner, CNS, Arlette Nguyen, [facility], Jasiel Obrien, CNP, Maricarmen Garcia, CNP, Steve Bermeo, [facility]    Discharge Summary     Patient ID: Ama Soares  :  1945   MRN: [id number]     ACCOUNT:  [de-identified]   Patient's PCP: ROBB Jones CNP  Admit Date: 2022   Discharge Date: 2022   Length of Stay: 4  Code Status:  Full Code  Admitting Physician: Elza Claros MD  Discharge Physician: ROBB Pulliam NP     Active Discharge Diagnoses:     Hospital Problem Lists:  Principal Problem (Resolved):    SBO (small bowel obstruction) [facility])  Active Problems:    CKD (chronic kidney disease)    BPH (benign prostatic hyperplasia)    Essential hypertension      Admission Condition:  serious     Discharged Condition: stable    Hospital Stay:     Hospital Course:  Ama Soares is a 77 y.o. male who was admitted for the management of  SBO (small bowel obstruction) ([address] , presented to ER with Abdominal Pain (Upper abdominal pain, denies n/v/d. Started around noon)While in ED, CT abdomen revealed SBO, mild mesenteric edema and trace abdominopelvic ascites, diverticulosis, bilateral kidney stones. General surgery was consulted.   On 7/24 patient underwent a ex lap with lysis of adhesions and umbilical hernia repair. NG was placed and he was kept NPO for bowel rest until 7/26 when NG clamped. Renal function improved. On 7/27 NG tube was removed, patient had return of bowel function and diet was advanced which he tolerated well. Significant therapeutic interventions: see above     Significant Diagnostic Studies:   Labs / Micro:  CBC:   Lab Results   Component Value Date/Time    WBC 5.0 07/27/2022 08:53 AM    RBC 4.62 07/27/2022 08:53 AM    HGB 14.5 07/27/2022 08:53 AM    HCT 42.8 07/27/2022 08:53 AM    MCV 92.6 07/27/2022 08:53 AM    MCH 31.4 07/27/2022 08:53 AM    MCHC 33.9 07/27/2022 08:53 AM    RDW 12.2 07/27/2022 08:53 AM     07/27/2022 08:53 AM     BMP:    Lab Results   Component Value Date/Time    GLUCOSE 108 07/27/2022 08:53 AM     07/27/2022 08:53 AM    K 4.3 07/27/2022 08:53 AM     07/27/2022 08:53 AM    CO2 25 07/27/2022 08:53 AM    ANIONGAP 8 07/27/2022 08:53 AM    BUN 14 07/27/2022 08:53 AM    CREATININE 1.11 07/27/2022 08:53 AM    BUNCRER 13 07/27/2022 08:53 AM    CALCIUM 8.7 07/27/2022 08:53 AM    LABGLOM >60 07/27/2022 08:53 AM    GFRAA >60 07/27/2022 08:53 AM    GFR      07/27/2022 08:53 AM        Radiology:  CT ABDOMEN PELVIS W IV CONTRAST Additional Contrast? None    Result Date: 7/24/2022  1. Small-bowel obstruction with closed loop configuration. Mild mesenteric edema and trace abdominopelvic ascites. 2.  Partially visualized clustered nodular opacities in the central right lower lobe. This favors an inflammatory process. Follow-up with chest CT is recommended as this is only partially visualized. 3.  Bilateral punctate nephrolithiasis. 4.  Diverticulosis. XR CHEST PORTABLE    Result Date: 7/24/2022  No acute airspace disease identified. XR ABDOMEN FOR NG/OG/NE TUBE PLACEMENT    Result Date: 7/24/2022  Enteric tube tip and side port project over the gastric body.        Consultations: Consults:     Final Specialist Recommendations/Findings:   IP CONSULT TO GENERAL SURGERY  IP CONSULT TO HOSPITALIST      The patient was seen and examined on day of discharge and this discharge summary is in conjunction with any daily progress note from day of discharge. Discharge plan:     Disposition: Home    Physician Follow Up:   ROBB Palacios CNP  [address] [address]  [phone]    Follow up in 1 week(s)      DO Stoney Monahan [facility] [address] [facility]  [phone]    Follow up in 2 week(s)       Requiring Further Evaluation/Follow Up POST HOSPITALIZATION/Incidental Findings: outpatient follow up with general surgery     Diet: regular diet    Activity: As tolerated    Instructions to Patient: take all medications as prescribed, reduce use/frequency of narcotics as pain becomes more manageable     Discharge Medications:      Medication List        START taking these medications      oxyCODONE 5 MG immediate release tablet  Commonly known as: ROXICODONE  Take 1 tablet by mouth every 12 hours as needed for Pain for up to 3 days. CONTINUE taking these medications      CPAP Machine Misc  by Does not apply route For nightly use. DX: G47.33     Respiratory Therapy Supplies Misc  CPAP headgear, [id number]; to be used nightly. Change every 6 months. DX: G47.33            ASK your doctor about these medications      lisinopril-hydroCHLOROthiazide 20-25 MG per tablet  Commonly known as: PRINZIDE;ZESTORETIC  take 1/2 tablet by mouth once daily     therapeutic multivitamin-minerals tablet               Where to Get Your Medications        These medications were sent to [facility] [address], [address] [address]      Phone: [phone]   oxyCODONE 5 MG immediate release tablet         No discharge procedures on file.     Time Spent on discharge is  28 mins in patient examination, evaluation, counseling as well as medication reconciliation, prescriptions for required medications, discharge plan and follow up. Electronically signed by   ROBB Osullivan NP  7/28/2022  3:33 PM      Thank you ROBB Mosquera - INKKI for the opportunity to be involved in this patient's care.

## 2022-07-28 NOTE — PLAN OF CARE
Problem: Discharge Planning  Goal: Discharge to home or other facility with appropriate resources  Outcome: Completed  Flowsheets (Taken 7/28/2022 4935)  Discharge to home or other facility with appropriate resources:   Identify barriers to discharge with patient and caregiver   Refer to discharge planning if patient needs post-hospital services based on physician order or complex needs related to functional status, cognitive ability or social support system     Problem: Pain  Goal: Verbalizes/displays adequate comfort level or baseline comfort level  Outcome: Completed     Problem: Safety - Adult  Goal: Free from fall injury  Outcome: Completed     Problem: ABCDS Injury Assessment  Goal: Absence of physical injury  Outcome: Completed

## 2022-07-28 NOTE — PROGRESS NOTES
General Surgery:  Daily Progress Note                  PATIENT NAME: Ama Soares     TODAY'S DATE: 7/28/2022  CC: Postop abdominal pain    SUBJECTIVE:     Pt seen and examined at bedside. No acute events overnight,. AF, HDS this am. SBP to 170s overnight, now 130s. States he had some abdominal pain last night and PO pain medications took a little longer to work than he would like, but his pain did improve. He is tolerating cld, no nausea or vomiting. States he continues to pass gas, no bowel movement as of yet. Ambulating up and down the halls. Voiding appropriately. ROS  Gen: no fevers, chills  Card: no chest pain, palpitations  Resp: no SOB, no wheezing  Abd: postop abdominal pain, no nausea or emesis  Neuro: no HA, dizziness, syncope    OBJECTIVE:   VITALS:  /78   Pulse 56   Temp 98.2 °F (36.8 °C) (Oral)   Resp 17   Ht 6' 3\" (1.905 m)   Wt 230 lb (104.3 kg)   SpO2 94%   BMI 28.75 kg/m²      INTAKE/OUTPUT:      Intake/Output Summary (Last 24 hours) at 7/28/2022 0616  Last data filed at 7/28/2022 0254  Gross per 24 hour   Intake 1634.92 ml   Output 1025 ml   Net 609.92 ml     PHYSICAL EXAM:  General Appearance: awake, alert, oriented, in no acute distress  HEENT:  Normocephalic, atraumatic, mucus membranes moist    NGT  Heart: Heart regular rate and rhythm  Lungs: Breathing Pattern: regular, no distress  Abdomen: Soft, appropriately tender to palpation, no guarding or peritoneal signs  Incision: Incision without redness warmth purulent drainage, abdominal binder in place  Extremities: No cyanosis, pitting edema, rashes noted. Skin: Skin color, texture, turgor normal. No rashes or lesions.       Data:  CBC with Differential:    Lab Results   Component Value Date/Time    WBC 5.0 07/27/2022 08:53 AM    RBC 4.62 07/27/2022 08:53 AM    HGB 14.5 07/27/2022 08:53 AM    HCT 42.8 07/27/2022 08:53 AM     07/27/2022 08:53 AM    MCV 92.6 07/27/2022 08:53 AM    MCH 31.4 07/27/2022 08:53 AM    [facility] 33.9 07/27/2022 08:53 AM    RDW 12.2 07/27/2022 08:53 AM    LYMPHOPCT 19 07/27/2022 08:53 AM    MONOPCT 11 07/27/2022 08:53 AM    BASOPCT 1 07/27/2022 08:53 AM    MONOSABS 0.54 07/27/2022 08:53 AM    LYMPHSABS 0.96 07/27/2022 08:53 AM    EOSABS 0.18 07/27/2022 08:53 AM    BASOSABS 0.04 07/27/2022 08:53 AM    DIFFTYPE NOT REPORTED 11/18/2021 03:55 PM     CMP:    Lab Results   Component Value Date/Time     07/27/2022 08:53 AM    K 4.3 07/27/2022 08:53 AM     07/27/2022 08:53 AM    CO2 25 07/27/2022 08:53 AM    BUN 14 07/27/2022 08:53 AM    CREATININE 1.11 07/27/2022 08:53 AM    GFRAA >60 07/27/2022 08:53 AM    LABGLOM >60 07/27/2022 08:53 AM    GLUCOSE 108 07/27/2022 08:53 AM    PROT 5.8 07/25/2022 05:56 AM    LABALBU 3.7 07/25/2022 05:56 AM    CALCIUM 8.7 07/27/2022 08:53 AM    BILITOT 1.02 07/25/2022 05:56 AM    ALKPHOS 50 07/25/2022 05:56 AM    AST 11 07/25/2022 05:56 AM    ALT 10 07/25/2022 05:56 AM       Radiology Review:  CT ABDOMEN PELVIS W IV CONTRAST Additional Contrast? None    Result Date: 7/24/2022  1. Small-bowel obstruction with closed loop configuration. Mild mesenteric edema and trace abdominopelvic ascites. 2.  Partially visualized clustered nodular opacities in the central right lower lobe. This favors an inflammatory process. Follow-up with chest CT is recommended as this is only partially visualized. 3.  Bilateral punctate nephrolithiasis. 4.  Diverticulosis. XR CHEST PORTABLE    Result Date: 7/24/2022  No acute airspace disease identified. XR ABDOMEN FOR NG/OG/NE TUBE PLACEMENT    Result Date: 7/24/2022  Enteric tube tip and side port project over the gastric body.         ASSESSMENT:  Active Hospital Problems    Diagnosis Date Noted    SBO (small bowel obstruction) ([facility] [Z70.290] 07/24/2022     Priority: Medium    CKD (chronic kidney disease) [N18.9] 07/24/2022     Priority: Medium    Essential hypertension [I10] 11/17/2015    BPH (benign prostatic hyperplasia) [N40.0] 05/14/2015       77 y.o. male with closed-loop small bowel obstruction  - POD#4 s/p exploratory laparotomy, lysis of adhesions and umbilical hernia repair     Plan:  Diet: advance to fld  Monitor and record bowel fxn  Pain control, limit opioids as able  Heparin DVT prophylaxis  Encourage IS, deep breathing   Ambulate patient, out of bed and in chair  Strict I/O's  ABD as needed to midline incision. Ok to shower. Geronimo Tuttle, DO  General surgery, PGY 2  Attending Physician Statement  I have discussed the case, including pertinent history and exam findings with the resident. I agree with the assessment, plan and orders as documented by the resident.      Advance diet

## 2022-07-28 NOTE — PROGRESS NOTES
[facility]  Office: [address], DO, Jose A Phelps, DO, Constance Chu, DO, Adore Carmelo Blood, DO, Werner Ko MD, Kathryn Reese MD, Peggy Sanz MD, Anne Liz MD,  Aleah Jo MD, Dank Lockhart MD, Ana Stoll, DO, Lilibeth Barbosa MD,  Fuad Brady MD, Shashi Golden MD, Mita Jones DO, Moriah Kitchen MD, Jaison Mendez MD, Domenico Madrigal MD, Juan Miguel Zepeda DO, Marc Rich MD, Denia Barrett MD, Kaity Goel, CNP,  Trupti Horton, CNP, Nicolas Nguyen, CNP, Pau Gardner, CNP, Kleber Garg PA-C, Sammie Montgomery, DNP, Diamond Joseph, CNP, [place], CNP, Tenisha Estes, CNP, Sri Vela, CNP, Yoni Michael, CNP, Edgar Beckman, CNS, Ny Marks, [facility], Tino Mosqueda, CNP, Angel Renee, CNP, Collin Cueva, [facility]    Progress Note    7/28/2022    2:35 PM    Name:   Patrica Cheung  MRN:     [id number]     [place]:      [de-identified]   Room:   2006/2006-02   Day:  4  Admit Date:  7/24/2022  1:36 AM    PCP:   ROBB Lanza CNP  Code Status:  Full Code    Subjective:     C/C:   Chief Complaint   Patient presents with    Abdominal Pain     Upper abdominal pain, denies n/v/d. Started around noon     Interval History Status: improved. Patient tolerated full liquid diet this morning and he was advanced to general diet for lunch. He reports that he did have some abdominal cramping following lunch but it is tolerable. Brief History:     7/24 - 7/25 -  Patrica Cheung is a 77 y.o. Non- / non  male who presents with Abdominal Pain (Upper abdominal pain, denies n/v/d. Started around noon)   and is admitted to the hospital for the management of SBO (small bowel obstruction) ([address] Patient started having abdominal pressure and nausea yesterday afternoon.  No history of abdominal surgeries, although patient says that he did have a colonoscopy earlier this month. He says polyps were seen on colonoscopy. He has PMH of HTN and BPH. While in ED, CT abdomen revealed SBO, mild mesenteric edema and trace abdominopelvic ascites, diverticulosis, bilateral kidney stones. General surgery was consulted. 7/26 -NG clamped. Renal function improved. Blood pressure mildly elevated. Diet advancement and NG removal at surgery discretion. 7/27 - NG tube removed overnight. Patient reports only minimal abdominal discomfort. Very minimal flatus. No bowel movements. Diet advancement per general surgery. Review of Systems:     Constitutional:  negative for chills, fevers, sweats  Respiratory:  negative for cough, dyspnea on exertion, shortness of breath, wheezing  Cardiovascular:  negative for chest pain, chest pressure/discomfort, lower extremity edema, palpitations  Gastrointestinal:  minimal abdominal pain, no constipation, diarrhea, nausea, vomiting. No gas  Neurological:  negative for dizziness, headache    Medications: Allergies:  No Known Allergies    Current Meds:   Scheduled Meds:    lisinopril  10 mg Oral Daily    sodium chloride  80 mL IntraVENous Once    sodium chloride flush  5-40 mL IntraVENous 2 times per day    heparin (porcine)  5,000 Units SubCUTAneous 3 times per day     Continuous Infusions:    dextrose 5% and 0.45% NaCl with KCl 20 mEq Stopped (07/28/22 1028)    sodium chloride       PRN Meds: acetaminophen, oxyCODONE, phenol, sodium chloride flush, sodium chloride flush, sodium chloride, [DISCONTINUED] potassium chloride **OR** [DISCONTINUED] potassium alternative oral replacement **OR** potassium chloride, magnesium sulfate, [DISCONTINUED] ondansetron **OR** ondansetron, hydrALAZINE    Data:     Past Medical History:   has a past medical history of Diverticulosis, DJD (degenerative joint disease), Erectile dysfunction, Hemorrhoids, Hernia, Hypertension, Osteoarthritis, Sleep apnea, and Tubular adenoma of colon.     Social History:   reports that he has never smoked. He has never used smokeless tobacco. He reports current alcohol use. He reports that he does not use drugs. Family History:   Family History   Problem Relation Age of Onset    No Known Problems Mother     No Known Problems Father        Vitals:  BP (!) 156/76   Pulse 68   Temp 98.4 °F (36.9 °C) (Oral)   Resp 18   Ht 6' 3\" (1.905 m)   Wt 230 lb (104.3 kg)   SpO2 95%   BMI 28.75 kg/m²   Temp (24hrs), Av.2 °F (36.8 °C), Min:97.8 °F (36.6 °C), Max:98.4 °F (36.9 °C)    No results for input(s): POCGLU in the last 72 hours. I/O (24Hr): Intake/Output Summary (Last 24 hours) at 2022 1435  Last data filed at 2022 1341  Gross per 24 hour   Intake 2926.4 ml   Output 1975 ml   Net 951.4 ml       Labs:  Hematology:  Recent Labs     22  0942 22  0853   WBC 7.0 5.0   RBC 5.07 4.62   HGB 15.9 14.5   HCT 47.3 42.8   MCV 93.3 92.6   MCH 31.4 31.4   MCHC 33.6 33.9   RDW 12.8 12.2   * 128*   MPV 11.1 11.1     Chemistry:  Recent Labs     22  0942 22  0853    139   K 4.0 4.3    106   CO2 25 25   GLUCOSE 98 108*   BUN 17 14   CREATININE 1.22* 1.11   ANIONGAP 10 8*   LABGLOM 58* >60   GFRAA >60 >60   CALCIUM 8.8 8.7     No results for input(s): PROT, LABALBU, LABA1C, U0EKUGW, A2OKTBX, FT4, TSH, AST, ALT, LDH, GGT, ALKPHOS, LABGGT, BILITOT, BILIDIR, AMMONIA, AMYLASE, LIPASE, LACTATE, CHOL, HDL, LDLCHOLESTEROL, CHOLHDLRATIO, TRIG, VLDL, PEN86SD, PHENYTOIN, PHENYF, URICACID, POCGLU in the last 72 hours. ABG:No results found for: POCPH, PHART, PH, POCPCO2, EMZ6BOL, PCO2, POCPO2, PO2ART, PO2, POCHCO3, BPJ1CFL, HCO3, NBEA, PBEA, BEART, BE, THGBART, THB, WPO5SBD, DBTP1ODU, V9RDDQHU, O2SAT, FIO2  No results found for: SPECIAL  No results found for: CULTURE    Radiology:  CT ABDOMEN PELVIS W IV CONTRAST Additional Contrast? None    Result Date: 2022  1. Small-bowel obstruction with closed loop configuration.   Mild mesenteric edema and trace abdominopelvic ascites. 2.  Partially visualized clustered nodular opacities in the central right lower lobe. This favors an inflammatory process. Follow-up with chest CT is recommended as this is only partially visualized. 3.  Bilateral punctate nephrolithiasis. 4.  Diverticulosis. XR CHEST PORTABLE    Result Date: 7/24/2022  No acute airspace disease identified. XR ABDOMEN FOR NG/OG/NE TUBE PLACEMENT    Result Date: 7/24/2022  Enteric tube tip and side port project over the gastric body.        Physical Examination:        General appearance:  alert, cooperative and no distress  Mental Status:  oriented to person, place and time and normal affect  Lungs:  clear to auscultation bilaterally, normal effort  Heart:  regular rate and rhythm, no murmur  Abdomen:  soft, nontender, nondistended, normal bowel sounds, no masses, hepatomegaly, splenomegaly  Extremities:  no edema, redness, tenderness in the calves  Skin:  no gross lesions, rashes, induration    Assessment:        Hospital Problems             Last Modified POA    * (Principal) SBO (small bowel obstruction) ([address] 7/24/2022 Yes    CKD (chronic kidney disease) 7/24/2022 Yes    BPH (benign prostatic hyperplasia) (Chronic) 7/24/2022 Yes    Essential hypertension (Chronic) 7/24/2022 Yes       Plan:        Small bowel obstruction: resolved, discontinue IV fluids, continue general diet, appreciate general surgery input  Monitor and control blood pressure-BP slightly elevated however he has not been able to take his meds due to n.p.o. status, resume home cardiac meds  Monitor labs, replace electrolytes as needed  Heparin SQ for DVT prophylaxis  Pain control as needed  Encourage ambulation  Incentive spirometer every hour while awake  Anticipate discharge this evening if he is able to tolerate dinner  Plan discussed with patient and staff  ROBB Sepulveda NP  7/28/2022  2:35 PM

## 2022-07-28 NOTE — PLAN OF CARE
Problem: Discharge Planning  Goal: Discharge to home or other facility with appropriate resources  7/28/2022 0229 by Earle Lerner RN  Outcome: Progressing  Flowsheets (Taken 7/28/2022 0229)  Discharge to home or other facility with appropriate resources:   Arrange for needed discharge resources and transportation as appropriate   Identify barriers to discharge with patient and caregiver   Identify discharge learning needs (meds, wound care, etc)     Problem: Pain  Goal: Verbalizes/displays adequate comfort level or baseline comfort level  7/28/2022 0229 by Earle Lerner RN  Outcome: Progressing

## 2022-07-29 ENCOUNTER — TELEPHONE (OUTPATIENT)
Dept: FAMILY MEDICINE CLINIC | Age: 77
End: 2022-07-29

## 2022-07-29 NOTE — PROGRESS NOTES
Patient discharged home with wife via car. Patient ambulated to car, gait steady. Discharge instructions reviewed, and no questions at this time.

## 2022-07-29 NOTE — TELEPHONE ENCOUNTER
----- Message from Abbe Hoskins sent at 7/29/2022  1:20 PM EDT -----  Subject: Appointment Request    Reason for Call: Established Patient Appointment needed: Routine Hospital   Follow Up    QUESTIONS    Reason for appointment request? Available appointments did not meet   patient need     Additional Information for Provider? Pt needs hosp f/u within 1 wk.  D/c   from [address] for bowel obstruction 7/28.   ---------------------------------------------------------------------------  --------------  CALL BACK INFO  [phone]; OK to leave message on voicemail  ---------------------------------------------------------------------------  --------------  SCRIPT ANSWERS  COVID Screen: Sue eRnae

## 2022-08-01 ENCOUNTER — TELEPHONE (OUTPATIENT)
Dept: FAMILY MEDICINE CLINIC | Age: 77
End: 2022-08-01

## 2022-08-01 NOTE — TELEPHONE ENCOUNTER
[facility] Transitions Initial Follow Up Call    Outreach made within 2 business days of discharge: Yes    Patient: Excell Carrier Patient : 1945   MRN: [id number]  Reason for Admission: There are no discharge diagnoses documented for the most recent discharge.   Discharge Date: 22       Spoke with: [address]    Discharge department/facility: [facility]  Scheduled appointment with PCP within 7-14 days    Follow Up  Future Appointments   Date Time Provider Charity Blanton   2022 10:30 AM Juan Miguel Balsam, APRN - NP [facility]   11/3/2022  9:00 AM [facility]

## 2022-08-05 ENCOUNTER — OFFICE VISIT (OUTPATIENT)
Dept: GASTROENTEROLOGY | Age: 77
End: 2022-08-05
Payer: MEDICARE

## 2022-08-05 VITALS
WEIGHT: 216 LBS | DIASTOLIC BLOOD PRESSURE: 77 MMHG | SYSTOLIC BLOOD PRESSURE: 123 MMHG | HEART RATE: 75 BPM | BODY MASS INDEX: 27 KG/M2

## 2022-08-05 DIAGNOSIS — K57.90 DIVERTICULOSIS: Primary | ICD-10-CM

## 2022-08-05 DIAGNOSIS — K63.5 HYPERPLASTIC POLYP OF DESCENDING COLON: ICD-10-CM

## 2022-08-05 DIAGNOSIS — K64.9 HEMORRHOIDS, UNSPECIFIED HEMORRHOID TYPE: ICD-10-CM

## 2022-08-05 DIAGNOSIS — D12.6 TUBULAR ADENOMA OF COLON: ICD-10-CM

## 2022-08-05 PROCEDURE — G8417 CALC BMI ABV UP PARAM F/U: HCPCS | Performed by: NURSE PRACTITIONER

## 2022-08-05 PROCEDURE — 1123F ACP DISCUSS/DSCN MKR DOCD: CPT | Performed by: NURSE PRACTITIONER

## 2022-08-05 PROCEDURE — 1111F DSCHRG MED/CURRENT MED MERGE: CPT | Performed by: NURSE PRACTITIONER

## 2022-08-05 PROCEDURE — 99213 OFFICE O/P EST LOW 20 MIN: CPT | Performed by: NURSE PRACTITIONER

## 2022-08-05 PROCEDURE — 1036F TOBACCO NON-USER: CPT | Performed by: NURSE PRACTITIONER

## 2022-08-05 PROCEDURE — 3017F COLORECTAL CA SCREEN DOC REV: CPT | Performed by: NURSE PRACTITIONER

## 2022-08-05 PROCEDURE — G8427 DOCREV CUR MEDS BY ELIG CLIN: HCPCS | Performed by: NURSE PRACTITIONER

## 2022-08-05 ASSESSMENT — ENCOUNTER SYMPTOMS
ALLERGIC/IMMUNOLOGIC NEGATIVE: 1
RESPIRATORY NEGATIVE: 1
GASTROINTESTINAL NEGATIVE: 1

## 2022-08-05 NOTE — PROGRESS NOTES
GI CLINIC FOLLOW UP    INTERVAL HISTORY:   No referring provider defined for this encounter. Chief Complaint   Patient presents with    Other     Pt here colonoscopy, hernia repair      HISTORY OF PRESENT ILLNESS:     Being seen for follow-up polyp surveillance colonoscopy. Colonoscopy prep was good. In the lower right colon 2 to 3 mm polyp seen excised with biopsy forceps. This was a tubular adenoma. Has diverticulosis. In the sigmoid colon noted to have diverticulosis. Also has a 3 to 4 mm polyp in the mid sigmoid colon removed with biopsy forceps. This was a hyperplastic polyp. Hemorrhoids    7/24/2022 patient had exploratory laparotomy, lysis of adhesions, umbilical hernia repair for closed-loop small bowel obstruction. At present he is doing well. Denies any abdominal pain, nausea, vomiting. Bowel movements are satisfactory. Reports has follow-up with general surgery in 1 week. Past Medical,Family, and Social History reviewed and does contribute to the patient presentingcondition. Patient's PMH/PSH,SH,PSYCH Hx, MEDs, ALLERGIES, and ROS were all reviewed and updated in the appropriate sections.     PAST MEDICAL HISTORY:  Past Medical History:   Diagnosis Date    Diverticulosis     DJD (degenerative joint disease)     Erectile dysfunction     Hemorrhoids     Hernia     Hypertension     Osteoarthritis     Sleep apnea     Tubular adenoma of colon        Past Surgical History:   Procedure Laterality Date    ABDOMINAL EXPLORATION SURGERY  07/24/2022    EXPLORATORY LAPAROTOMY LYSIS OF ADHESIONS UMBILICAL HERNIA REPAIR WITHOUT MESH by Dr. Andi Germain  2013; due 2018    COLONOSCOPY N/A 07/07/2022    COLONOSCOPY DIAGNOSTIC performed by Sharmila Ellington MD at [address] [place] [address] Road      rt. inguinal with mesh    JOINT REPLACEMENT  right knee    LAPAROTOMY N/A 7/24/2022    EXPLORATORY LAPAROTOMY LYSIS OF ADHESIONS UMBILICAL HERNIA REPAIR WITHOUT MESH performed by Luis Orantes DO at [address] [place], [place] [address]vd:    Current Outpatient Medications:     lisinopril-hydroCHLOROthiazide (PRINZIDE;ZESTORETIC) 20-25 MG per tablet, take 1/2 tablet by mouth once daily, Disp: 90 tablet, Rfl: 1    Multiple Vitamins-Minerals (THERAPEUTIC MULTIVITAMIN-MINERALS) tablet, Take 1 tablet by mouth daily, Disp: , Rfl:     CPAP Machine MISC, by Does not apply route For nightly use. DX: G47.33, Disp: 1 each, Rfl: 0    Respiratory Therapy Supplies MISC, CPAP headgear, ; to be used nightly. Change every 6 months.  DX: G47.33, Disp: 1 each, Rfl: 1    ALLERGIES:   No Known Allergies    FAMILY HISTORY:       Problem Relation Age of Onset    No Known Problems Mother     No Known Problems Father          SOCIAL HISTORY:   Social History     Socioeconomic History    Marital status:      Spouse name: Not on file    Number of children: Not on file    Years of education: Not on file    Highest education level: Not on file   Occupational History    Not on file   Tobacco Use    Smoking status: Never    Smokeless tobacco: Never   Vaping Use    Vaping Use: Never used   Substance and Sexual Activity    Alcohol use: Yes     Comment: social    Drug use: No    Sexual activity: Not on file   Other Topics Concern    Not on file   Social History Narrative    Not on file     Social Determinants of Health     Financial Resource Strain: Low Risk     Difficulty of Paying Living Expenses: Not hard at all   Food Insecurity: No Food Insecurity    Worried About Running Out of Food in the Last Year: Never true    Ran Out of Food in the Last Year: Never true   Transportation Needs: Not on file   Physical Activity: Not on file   Stress: Not on file   Social Connections: Not on file   Intimate Partner Violence: Not on file   Housing Stability: Not on file       REVIEW OF SYSTEMS: A 12-point review of systemswas obtained and pertinent positives and negatives were enumerated above in the history of present illness. All other reviewed systems / symptoms were negative. Review of Systems   Constitutional:  Positive for unexpected weight change. HENT: Negative. Eyes:  Positive for visual disturbance (glasses). Respiratory: Negative. Cardiovascular: Negative. Gastrointestinal: Negative. Endocrine: Negative. Genitourinary: Negative. Musculoskeletal: Negative. Skin: Negative. Allergic/Immunologic: Negative. Neurological: Negative. Hematological: Negative. Psychiatric/Behavioral: Negative. PHYSICAL EXAMINATION: Vital signs reviewed per the nursing documentation. /77   Pulse 75   Wt 216 lb (98 kg)   BMI 27.00 kg/m²   Body mass index is 27 kg/m². Physical Exam  Constitutional:       Appearance: Normal appearance. Eyes:      General: No scleral icterus. Pupils: Pupils are equal, round, and reactive to light. Cardiovascular:      Rate and Rhythm: Normal rate and regular rhythm. Heart sounds: Normal heart sounds. Pulmonary:      Effort: Pulmonary effort is normal.      Breath sounds: Normal breath sounds. Abdominal:      General: Bowel sounds are normal. There is no distension. Palpations: Abdomen is soft. There is no mass. Tenderness: There is no abdominal tenderness. There is no guarding. Skin:     General: Skin is warm and dry. Coloration: Skin is not jaundiced. Neurological:      Mental Status: He is alert and oriented to person, place, and time. Mental status is at baseline.          LABORATORY DATA: Reviewed  Lab Results   Component Value Date    WBC 5.0 07/27/2022    HGB 14.5 07/27/2022    HCT 42.8 07/27/2022    MCV 92.6 07/27/2022     (L) 07/27/2022     07/27/2022    K 4.3 07/27/2022     07/27/2022    CO2 25 07/27/2022    BUN 14 07/27/2022    CREATININE 1.11 07/27/2022    LABALBU 3.7 07/25/2022    BILITOT 1.02 07/25/2022    ALKPHOS 50 07/25/2022    AST 11 07/25/2022    ALT 10 07/25/2022         Lab Results Component Value Date    RBC 4.62 07/27/2022    HGB 14.5 07/27/2022    MCV 92.6 07/27/2022    MCH 31.4 07/27/2022    MCHC 33.9 07/27/2022    RDW 12.2 07/27/2022    MPV 11.1 07/27/2022    BASOPCT 1 07/27/2022    LYMPHSABS 0.96 (L) 07/27/2022    MONOSABS 0.54 07/27/2022    NEUTROABS 3.23 07/27/2022    EOSABS 0.18 07/27/2022    BASOSABS 0.04 07/27/2022         DIAGNOSTIC TESTING:     CT ABDOMEN PELVIS W IV CONTRAST Additional Contrast? None    Result Date: 7/24/2022  EXAMINATION: CT OF THE ABDOMEN AND PELVIS WITH CONTRAST 7/24/2022 2:56 am TECHNIQUE: CT of the abdomen and pelvis was performed with the administration of intravenous contrast. Multiplanar reformatted images are provided for review. Automated exposure control, iterative reconstruction, and/or weight based adjustment of the mA/kV was utilized to reduce the radiation dose to as low as reasonably achievable. COMPARISON: None. HISTORY: ORDERING SYSTEM PROVIDED HISTORY: Generalized abdominal pain, nausea, abdominal bloating TECHNOLOGIST PROVIDED HISTORY: Generalized abdominal pain, nausea, abdominal bloating Decision Support Exception - unselect if not a suspected or confirmed emergency medical condition->Emergency Medical Condition (MA) FINDINGS: Lower Chest: Partially visualized clustered nodular opacities in the central right lower lobe. Bilateral ground-glass and linear opacities are also noted. Organs: The liver, gallbladder, pancreas, spleen, kidneys, and adrenals reveal no acute findings. Bilateral punctate nephrolithiasis. GI/Bowel: Dilated fluid-filled small bowel is noted with focal transition in the left mid abdomen with a closed loop configuration on axial image 136. No pneumatosis. Mesenteric edema is noted. Colonic diverticulosis. Pelvis: No acute findings. Prostatomegaly with findings of bladder wall hypertrophy. Peritoneum/Retroperitoneum: Trace abdominopelvic ascites. No free air. Bones/Soft Tissues: No acute findings.      1. Small-bowel obstruction with closed loop configuration. Mild mesenteric edema and trace abdominopelvic ascites. 2.  Partially visualized clustered nodular opacities in the central right lower lobe. This favors an inflammatory process. Follow-up with chest CT is recommended as this is only partially visualized. 3.  Bilateral punctate nephrolithiasis. 4.  Diverticulosis. XR CHEST PORTABLE    Result Date: 7/24/2022  EXAMINATION: ONE XRAY VIEW OF THE CHEST 7/24/2022 2:08 am COMPARISON: 05/23/2019 HISTORY: ORDERING SYSTEM PROVIDED HISTORY: Gastric pain TECHNOLOGIST PROVIDED HISTORY: Gastric pain Reason for Exam: Gastric pain FINDINGS: Shallow inflation. The cardiomediastinal silhouette is within normal limits. Calcified granuloma in the mid right lung field again noted. There is no consolidation, pneumothorax or evidence for edema. No evidence for effusion. No acute osseous abnormality is identified. No acute airspace disease identified. XR ABDOMEN FOR NG/OG/NE TUBE PLACEMENT    Result Date: 7/24/2022  EXAMINATION: ONE SUPINE XRAY VIEW(S) OF THE ABDOMEN 7/24/2022 4:47 am COMPARISON: CT abdomen and pelvis 07/24/2022 HISTORY: ORDERING SYSTEM PROVIDED HISTORY: Confirmation of course of NG/OG/NE tube and location of tip of tube TECHNOLOGIST PROVIDED HISTORY: Confirmation of course of NG/OG/NE tube and location of tip of tube Portable? ->Yes Reason for Exam: ng placement FINDINGS: Enteric tube tip and side port project over the gastric body. Unremarkable bowel gas pattern in the partially imaged abdomen. Enteric tube tip and side port project over the gastric body. IMPRESSION: Mr. Ramírez Tavera is a 77 y.o. male with    Diagnosis Orders   1. Diverticulosis        2. Tubular adenoma of colon        3. Hyperplastic polyp of descending colon        4. Hemorrhoids, unspecified hemorrhoid type            Colonoscopy reviewed with patient in detail. Had small tubular adenoma, hyperplastic polyp.   Has diverticulosis, hemorrhoids. Patient advised high-fiber diet. To avoid constipation and straining. Status post exploratory laparotomy for bowel obstruction with lysis of adhesions, umbilical hernia repair. Has follow-up with general surgery next week    Denies abdominal pain, nausea, vomiting. Tolerating diet. Bowel movement satisfactory. Thank you for allowing me to participate in the care of Mr. Carrasco. For any further questions please do not hesitate to contact me. I have reviewed and agree with the ROS entered by the MA/LPN.          SARA Mckeon    [facility]  Office #: [phone]

## 2022-08-11 DIAGNOSIS — M62.830 SPASM OF BACK MUSCLES: ICD-10-CM

## 2022-08-17 ENCOUNTER — OFFICE VISIT (OUTPATIENT)
Dept: FAMILY MEDICINE CLINIC | Age: 77
End: 2022-08-17
Payer: MEDICARE

## 2022-08-17 VITALS
HEART RATE: 78 BPM | OXYGEN SATURATION: 94 % | SYSTOLIC BLOOD PRESSURE: 110 MMHG | TEMPERATURE: 97.1 F | BODY MASS INDEX: 27.5 KG/M2 | WEIGHT: 220 LBS | DIASTOLIC BLOOD PRESSURE: 60 MMHG

## 2022-08-17 DIAGNOSIS — Z13.31 DEPRESSION SCREENING: ICD-10-CM

## 2022-08-17 DIAGNOSIS — Z09 HOSPITAL DISCHARGE FOLLOW-UP: Primary | ICD-10-CM

## 2022-08-17 DIAGNOSIS — F32.2 CURRENT SEVERE EPISODE OF MAJOR DEPRESSIVE DISORDER WITHOUT PSYCHOTIC FEATURES WITHOUT PRIOR EPISODE (HCC): ICD-10-CM

## 2022-08-17 DIAGNOSIS — K56.609 SBO (SMALL BOWEL OBSTRUCTION) (HCC): ICD-10-CM

## 2022-08-17 PROCEDURE — 1123F ACP DISCUSS/DSCN MKR DOCD: CPT | Performed by: NURSE PRACTITIONER

## 2022-08-17 PROCEDURE — 3017F COLORECTAL CA SCREEN DOC REV: CPT | Performed by: NURSE PRACTITIONER

## 2022-08-17 PROCEDURE — G0444 DEPRESSION SCREEN ANNUAL: HCPCS | Performed by: NURSE PRACTITIONER

## 2022-08-17 PROCEDURE — G8417 CALC BMI ABV UP PARAM F/U: HCPCS | Performed by: NURSE PRACTITIONER

## 2022-08-17 PROCEDURE — 1111F DSCHRG MED/CURRENT MED MERGE: CPT | Performed by: NURSE PRACTITIONER

## 2022-08-17 PROCEDURE — 1036F TOBACCO NON-USER: CPT | Performed by: NURSE PRACTITIONER

## 2022-08-17 PROCEDURE — G8427 DOCREV CUR MEDS BY ELIG CLIN: HCPCS | Performed by: NURSE PRACTITIONER

## 2022-08-17 PROCEDURE — 99214 OFFICE O/P EST MOD 30 MIN: CPT | Performed by: NURSE PRACTITIONER

## 2022-08-17 RX ORDER — IBUPROFEN 600 MG/1
TABLET ORAL
COMMUNITY
Start: 2022-06-09

## 2022-08-17 ASSESSMENT — PATIENT HEALTH QUESTIONNAIRE - PHQ9
4. FEELING TIRED OR HAVING LITTLE ENERGY: 3
10. IF YOU CHECKED OFF ANY PROBLEMS, HOW DIFFICULT HAVE THESE PROBLEMS MADE IT FOR YOU TO DO YOUR WORK, TAKE CARE OF THINGS AT HOME, OR GET ALONG WITH OTHER PEOPLE: 1
6. FEELING BAD ABOUT YOURSELF - OR THAT YOU ARE A FAILURE OR HAVE LET YOURSELF OR YOUR FAMILY DOWN: 0
SUM OF ALL RESPONSES TO PHQ QUESTIONS 1-9: 8
8. MOVING OR SPEAKING SO SLOWLY THAT OTHER PEOPLE COULD HAVE NOTICED. OR THE OPPOSITE, BEING SO FIGETY OR RESTLESS THAT YOU HAVE BEEN MOVING AROUND A LOT MORE THAN USUAL: 0
9. THOUGHTS THAT YOU WOULD BE BETTER OFF DEAD, OR OF HURTING YOURSELF: 0
SUM OF ALL RESPONSES TO PHQ QUESTIONS 1-9: 8
5. POOR APPETITE OR OVEREATING: 0
3. TROUBLE FALLING OR STAYING ASLEEP: 2
7. TROUBLE CONCENTRATING ON THINGS, SUCH AS READING THE NEWSPAPER OR WATCHING TELEVISION: 1
SUM OF ALL RESPONSES TO PHQ QUESTIONS 1-9: 8
SUM OF ALL RESPONSES TO PHQ QUESTIONS 1-9: 8
2. FEELING DOWN, DEPRESSED OR HOPELESS: 2

## 2022-08-17 NOTE — PROGRESS NOTES
Post-Discharge Transitional Care Follow Up      Anushka Nieves   YOB: 1945    Date of Office Visit:  8/17/2022  Date of Hospital Admission: 7/24/22  Date of Hospital Discharge: 7/28/22  Readmission Risk Score (high >=14%. Medium >=10%):Readmission Risk Score: 5.7      Care management risk score Rising risk (score 2-5) and Complex Care (Scores >=6): No Risk Score On File     Non face to face  following discharge, date last encounter closed (first attempt may have been earlier): 08/01/2022     Call initiated 2 business days of discharge: Yes     Hospital discharge follow-up  -     NM DISCHARGE MEDS RECONCILED W/ CURRENT OUTPATIENT MED LIST  SBO (small bowel obstruction) ([facility] [address]  Current severe episode of major depressive disorder without psychotic features without prior episode ([facility] [address]  -     sertraline (ZOLOFT) 50 MG tablet; Take 1 tablet by mouth daily, Disp-30 tablet, R-2Normal  -     Will add Zoloft - counseling offered and pt declined  -     NM OFFICE/OUTPATIENT ESTABLISHED LOW MDM 20-29 MIN  Depression screening  -     NM DEPRESSION SCREEN ANNUAL  PHQ-9 Total Score: 8 (8/17/2022  1:41 PM)  Thoughts that you would be better off dead, or of hurting yourself in some way: Not at all (8/17/2022  1:41 PM)     Medical Decision Making: moderate complexity  Return in about 6 weeks (around 9/28/2022) for f/u depression. On this date 8/17/2022 I have spent 30 minutes reviewing previous notes, test results and face to face with the patient discussing the diagnosis and importance of compliance with the treatment plan as well as documenting on the day of the visit. Subjective:   HPI    Inpatient course: Discharge summary reviewed- see chart. Interval history/Current status:     Patient here for hospital follow up. Reports that he was eating lunch and started having abdominal pain. He tried taking pepto bismol without relief. As the day went on his pain worsened and decided to go to ER.  He had an ; to be used nightly. Change every 6 months. DX: G47.33     therapeutic multivitamin-minerals tablet               Where to Get Your Medications        These medications were sent to [facility] [address] [address] [phone]  [place] [address]      Phone: [phone]   sertraline 50 MG tablet          Medications marked \"taking\" at this time  Outpatient Medications Marked as Taking for the 8/17/22 encounter (Office Visit) with ROBB Barlow CNP   Medication Sig Dispense Refill    ibuprofen (ADVIL;MOTRIN) 600 MG tablet take 1 tablet by mouth every 6 hours if needed for pain      sertraline (ZOLOFT) 50 MG tablet Take 1 tablet by mouth daily 30 tablet 2    lisinopril-hydroCHLOROthiazide (PRINZIDE;ZESTORETIC) 20-25 MG per tablet take 1/2 tablet by mouth once daily 90 tablet 1    Multiple Vitamins-Minerals (THERAPEUTIC MULTIVITAMIN-MINERALS) tablet Take 1 tablet by mouth daily      CPAP Machine MISC by Does not apply route For nightly use. DX: G47.33 1 each 0    Respiratory Therapy Supplies MISC CPAP headgear, ; to be used nightly. Change every 6 months. DX: G47.33 1 each 1        Medications patient taking as of now reconciled against medications ordered at time of hospital discharge: Yes    Review of Systems   Psychiatric/Behavioral:  Positive for behavioral problems. All other systems reviewed and are negative. Objective:    /60 (Site: Left Upper Arm, Position: Sitting, Cuff Size: Large Adult)   Pulse 78   Temp 97.1 °F (36.2 °C) (Temporal)   Wt 220 lb (99.8 kg)   SpO2 94%   BMI 27.50 kg/m²   Physical Exam  Vitals reviewed. Constitutional:       Appearance: Normal appearance. He is normal weight. HENT:      Head: Normocephalic and atraumatic. Eyes:      Pupils: Pupils are equal, round, and reactive to light. Cardiovascular:      Rate and Rhythm: Normal rate and regular rhythm.       Pulses: Normal pulses. Heart sounds: Normal heart sounds. Pulmonary:      Effort: Pulmonary effort is normal.      Breath sounds: Normal breath sounds. Abdominal:      General: Bowel sounds are normal.      Palpations: Abdomen is soft. Musculoskeletal:         General: Normal range of motion. Cervical back: Normal range of motion and neck supple. Skin:     General: Skin is warm and dry. Neurological:      General: No focal deficit present. Mental Status: He is alert and oriented to person, place, and time. Psychiatric:         Mood and Affect: Mood is depressed. Behavior: Behavior normal.         Thought Content: Thought content normal.         Judgment: Judgment normal.       An electronic signature was used to authenticate this note.   --Claudell Roussel, APRN - CNP

## 2022-10-31 ENCOUNTER — OFFICE VISIT (OUTPATIENT)
Dept: FAMILY MEDICINE CLINIC | Age: 77
End: 2022-10-31
Payer: MEDICARE

## 2022-10-31 VITALS
HEART RATE: 66 BPM | TEMPERATURE: 98.6 F | SYSTOLIC BLOOD PRESSURE: 108 MMHG | BODY MASS INDEX: 28.25 KG/M2 | OXYGEN SATURATION: 94 % | DIASTOLIC BLOOD PRESSURE: 60 MMHG | WEIGHT: 226 LBS

## 2022-10-31 DIAGNOSIS — Z23 FLU VACCINE NEED: ICD-10-CM

## 2022-10-31 DIAGNOSIS — F32.2 CURRENT SEVERE EPISODE OF MAJOR DEPRESSIVE DISORDER WITHOUT PSYCHOTIC FEATURES WITHOUT PRIOR EPISODE (HCC): Primary | ICD-10-CM

## 2022-10-31 PROCEDURE — 90694 VACC AIIV4 NO PRSRV 0.5ML IM: CPT | Performed by: NURSE PRACTITIONER

## 2022-10-31 PROCEDURE — 1123F ACP DISCUSS/DSCN MKR DOCD: CPT | Performed by: NURSE PRACTITIONER

## 2022-10-31 PROCEDURE — 1036F TOBACCO NON-USER: CPT | Performed by: NURSE PRACTITIONER

## 2022-10-31 PROCEDURE — G8484 FLU IMMUNIZE NO ADMIN: HCPCS | Performed by: NURSE PRACTITIONER

## 2022-10-31 PROCEDURE — 99213 OFFICE O/P EST LOW 20 MIN: CPT | Performed by: NURSE PRACTITIONER

## 2022-10-31 PROCEDURE — 3074F SYST BP LT 130 MM HG: CPT | Performed by: NURSE PRACTITIONER

## 2022-10-31 PROCEDURE — G8427 DOCREV CUR MEDS BY ELIG CLIN: HCPCS | Performed by: NURSE PRACTITIONER

## 2022-10-31 PROCEDURE — G8417 CALC BMI ABV UP PARAM F/U: HCPCS | Performed by: NURSE PRACTITIONER

## 2022-10-31 PROCEDURE — G0008 ADMIN INFLUENZA VIRUS VAC: HCPCS | Performed by: NURSE PRACTITIONER

## 2022-10-31 PROCEDURE — 3078F DIAST BP <80 MM HG: CPT | Performed by: NURSE PRACTITIONER

## 2022-10-31 NOTE — PROGRESS NOTES
[facility]  [address] [facility] [place]. Mariam [facility], [facility] 78  [phone]  [phone]    Robie Koyanagi is a 69 y.o. male who is here with c/o of:    Chief Complaint: Depression      Patient Accompanied by: n/a    HPI - Robie Koyanagi is here today with c/o:    Patient here for follow up depression. He ran out of his medication and then never called for refill. Says his mood improved with the medication. Denies any medication side effects. Denies any SI or HI.        Health Maintenance Due   Topic Date Due    Annual Wellness Visit (AWV)  Never done    Flu vaccine (1) 08/01/2022    COVID-19 Vaccine (3 - Booster for Hernando series) 10/13/2022        Patient Active Problem List:     Erectile dysfunction     Sleep apnea     Osteoarthritis     Hernia     BPH (benign prostatic hyperplasia)     Essential hypertension     Tubular adenoma     CKD (chronic kidney disease)     Past Medical History:   Diagnosis Date    Diverticulosis     DJD (degenerative joint disease)     Erectile dysfunction     Hemorrhoids     Hernia     Hypertension     Osteoarthritis     Sleep apnea     Tubular adenoma of colon       Past Surgical History:   Procedure Laterality Date    ABDOMINAL EXPLORATION SURGERY  07/24/2022    EXPLORATORY LAPAROTOMY LYSIS OF ADHESIONS UMBILICAL HERNIA REPAIR WITHOUT MESH by Dr. Bella Blanton  2013; due 2018    COLONOSCOPY N/A 07/07/2022    COLONOSCOPY DIAGNOSTIC performed by Zuri Maciel MD at [address] [place] [address]      rt. inguinal with mesh    JOINT REPLACEMENT  right knee    LAPAROTOMY N/A 7/24/2022    EXPLORATORY LAPAROTOMY LYSIS OF ADHESIONS UMBILICAL HERNIA REPAIR WITHOUT MESH performed by Anupam Kang DO at [facility] OR     Family History   Problem Relation Age of Onset    No Known Problems Mother     No Known Problems Father      Social History     Tobacco Use    Smoking status: Never    Smokeless tobacco: Never   Substance Use Topics Alcohol use: Yes     Comment: social     ALLERGIES:  No Known Allergies       Subjective   Review of Systems   Constitutional:  Negative for activity change, appetite change,unexpected weight change, chills, fever, and fatigue. HENT: Negative for ear pain, sore throat,  Rhinorrhea, sinus pain, sinus pressure, congestion. Eyes:  Negative for pain and discharge. Respiratory:  Negative for chest tightness, shortness of breath, wheezing, and cough. Cardiovascular:  Negative for chest pain, palpitations and leg swelling. Gastrointestinal: Negative for abdominal pain, blood in stool, constipation,diarrhea, nausea and vomiting. Endocrine: Negative for cold intolerance, heat intolerance, polydipsia, polyphagia and polyuria. Genitourinary: Negative for difficulty urinating, dysuria, flank pain, frequency, hematuria and urgency. Musculoskeletal: Negative for arthralgias, back pain, joint swelling, myalgias, neck pain and neck stiffness. Skin: Negative for rash and wound. Allergic/Immunologic: Negative for environmental allergies and food allergies. Neurological:  Negative for dizziness, light-headedness, numbness and headaches. Hematological:  Negative for adenopathy. Does not bruise/bleed easily. Psychiatric/Behavioral: Negative for self-injury, sleep disturbance and suicidal ideas. Objective   Physical Exam   PHYSICAL EXAM:   Constitutional: Megan Dan is oriented to person, place, and time. Vital signs are normal. Appears well-developed and well-nourished. Head: Normocephalic and atraumatic. Eyes:PERRL, EOMI, Conjunctiva normal, No discharge. Neck: Full passive range of motion. Non-tender on palpation. Neck supple. No thyromegaly present. Trachea normal.  Cardiovascular: Normal rate, regular rhythm, S1, S2, no murmur, no gallop, no friction rub, intact distal pulses. Pulmonary/Chest: Breath sounds are clear throughout, No respiratory distress, No wheezing, No chest tenderness.  Effort normal  Musculoskeletal: Extremities appear regular and symmetric. No evident masses, lesions, foreign bodies, or other abnormalities. No edema. No tenderness on palpation. Joints are stable. Full ROM, strength and tone are within normal limits. Neurological: Alert and oriented to person, place, and time. Normal motor function, Normal sensory function, No focal deficits noted. He has normal strength. Skin: Skin is warm, dry and intact. No obvious lesions on exposed skin  Psychiatric: Normal mood and affect. Speech is normal and behavior is normal.     Nursing note and vitals reviewed. Blood pressure 108/60, pulse 66, temperature 98.6 °F (37 °C), temperature source Temporal, weight 226 lb (102.5 kg), SpO2 94 %. Body mass index is 28.25 kg/m².     Wt Readings from Last 3 Encounters:   10/31/22 226 lb (102.5 kg)   08/17/22 220 lb (99.8 kg)   08/05/22 216 lb (98 kg)     BP Readings from Last 3 Encounters:   10/31/22 108/60   08/17/22 110/60   08/05/22 123/77       Admission on 07/24/2022, Discharged on 07/28/2022   Component Date Value Ref Range Status    Color, UA 07/24/2022 Yellow  Yellow Final    Turbidity UA 07/24/2022 Clear  Clear Final    Glucose, Ur 07/24/2022 NEGATIVE  NEGATIVE Final    Bilirubin Urine 07/24/2022 NEGATIVE  NEGATIVE Final    Ketones, Urine 07/24/2022 TRACE (A)  NEGATIVE Final    Specific Gravity, UA 07/24/2022 1.020  1.005 - 1.030 Final    Urine Hgb 07/24/2022 TRACE (A)  NEGATIVE Final    pH, UA 07/24/2022 5.5  5.0 - 8.0 Final    Protein, UA 07/24/2022 NEGATIVE  NEGATIVE Final    Urobilinogen, Urine 07/24/2022 Normal  Normal Final    Nitrite, Urine 07/24/2022 NEGATIVE  NEGATIVE Final    Leukocyte Esterase, Urine 07/24/2022 NEGATIVE  NEGATIVE Final    - 07/24/2022        Final    WBC, UA 07/24/2022 0 TO 2  0 - 5 /HPF Final    RBC, UA 07/24/2022 0 TO 2  0 - 2 /HPF Final    Casts UA 07/24/2022 2 TO 5  /LPF Final    Casts UA 07/24/2022 HYALINE  /LPF Final    Epithelial Cells UA 07/24/2022 0 TO 2  0 - 5 /HPF Final    Bacteria, UA 07/24/2022 RARE (A)  None Final    Mucus, UA 07/24/2022 1+ (A)  None Final    Ventricular Rate 07/24/2022 57  BPM Final    Atrial Rate 07/24/2022 57  BPM Final    P-R Interval 07/24/2022 164  ms Final    QRS Duration 07/24/2022 94  ms Final    Q-T Interval 07/24/2022 440  ms Final    QTc Calculation (Bazett) 07/24/2022 428  ms Final    P Axis 07/24/2022 62  degrees Final    R Axis 07/24/2022 -4  degrees Final    T Axis 07/24/2022 64  degrees Final    Glucose 07/24/2022 142 (A)  70 - 99 mg/dL Final    BUN 07/24/2022 22  8 - 23 mg/dL Final    Creatinine 07/24/2022 1.38 (A)  0.70 - 1.20 mg/dL Final    Bun/Cre Ratio 07/24/2022 16  9 - 20 Final    Calcium 07/24/2022 9.7  8.6 - 10.4 mg/dL Final    Sodium 07/24/2022 138  135 - 144 mmol/L Final    Potassium 07/24/2022 3.9  3.7 - 5.3 mmol/L Final    Chloride 07/24/2022 100  98 - 107 mmol/L Final    CO2 07/24/2022 28  20 - 31 mmol/L Final    Anion Gap 07/24/2022 10  9 - 17 mmol/L Final    GFR Non- 07/24/2022 50 (A)  >60 mL/min Final    GFR  07/24/2022 >60  >60 mL/min Final    GFR Comment 07/24/2022        Final    Comment: Average GFR for 79or more years old:   76 mL/min/1.73sq m  Chronic Kidney Disease:   <60 mL/min/1.73sq m  Kidney failure:   <15 mL/min/1.73sq m              eGFR calculated using average adult body mass. Additional eGFR calculator available at:        Decal[employer].br            Pro-BNP 07/24/2022 167  <300 pg/mL Final    Comment:       An age-independent cutoff point of 300 pg/ml has a 98% negative predictive value excluding   acute heart failure.       WBC 07/24/2022 10.7  3.5 - 11.3 k/uL Final    RBC 07/24/2022 5.39  4.21 - 5.77 m/uL Final    Hemoglobin 07/24/2022 16.8  13.0 - 17.0 g/dL Final    Hematocrit 07/24/2022 49.3  40.7 - 50.3 % Final    MCV 07/24/2022 91.5  82.6 - 102.9 fL Final    MCH 07/24/2022 31.2  25.2 - 33.5 pg Final    MCHC 07/24/2022 34.1 28.4 - 34.8 g/dL Final    RDW 07/24/2022 12.3  11.8 - 14.4 % Final    Platelets 58/84/5058 134 (A)  138 - 453 k/uL Final    MPV 07/24/2022 11.1  8.1 - 13.5 fL Final    NRBC Automated 07/24/2022 0.0  0.0 per 100 WBC Final    Seg Neutrophils 07/24/2022 89 (A)  36 - 65 % Final    Lymphocytes 07/24/2022 7 (A)  24 - 43 % Final    Monocytes 07/24/2022 4  3 - 12 % Final    Eosinophils % 07/24/2022 0 (A)  1 - 4 % Final    Basophils 07/24/2022 0  0 - 2 % Final    Immature Granulocytes 07/24/2022 0  0 % Final    Segs Absolute 07/24/2022 9.48 (A)  1.50 - 8.10 k/uL Final    Absolute Lymph # 07/24/2022 0.75 (A)  1.10 - 3.70 k/uL Final    Absolute Mono # 07/24/2022 0.38  0.10 - 1.20 k/uL Final    Absolute Eos # 07/24/2022 <0.03  0.00 - 0.44 k/uL Final    Basophils Absolute 07/24/2022 0.04  0.00 - 0.20 k/uL Final    Absolute Immature Granulocyte 07/24/2022 0.04  0.00 - 0.30 k/uL Final    Troponin, High Sensitivity 07/24/2022 9  0 - 22 ng/L Final    Comment:       High Sensitivity Troponin values cannot be compared with other Troponin methodologies. Patients with high levels of Biotin oral intake (i.e >5mg/day) may have falsely decreased   Troponin levels. Samples collected within 8 hours of biotin intake may require additional   information for diagnosis.       Lactic Acid, Sepsis 07/24/2022 1.2  0.5 - 1.9 mmol/L Final    Lipase 07/24/2022 34  13 - 60 U/L Final    Glucose 07/25/2022 146 (A)  70 - 99 mg/dL Final    BUN 07/25/2022 23  8 - 23 mg/dL Final    Creatinine 07/25/2022 1.38 (A)  0.70 - 1.20 mg/dL Final    Bun/Cre Ratio 07/25/2022 17  9 - 20 Final    Calcium 07/25/2022 8.4 (A)  8.6 - 10.4 mg/dL Final    Sodium 07/25/2022 136  135 - 144 mmol/L Final    Potassium 07/25/2022 4.3  3.7 - 5.3 mmol/L Final    Chloride 07/25/2022 103  98 - 107 mmol/L Final    CO2 07/25/2022 25  20 - 31 mmol/L Final    Anion Gap 07/25/2022 8 (A)  9 - 17 mmol/L Final    Alkaline Phosphatase 07/25/2022 50  40 - 129 U/L Final    ALT 07/25/2022 10 5 - 41 U/L Final    AST 07/25/2022 11  <40 U/L Final    Total Bilirubin 07/25/2022 1.02  0.3 - 1.2 mg/dL Final    Total Protein 07/25/2022 5.8 (A)  6.4 - 8.3 g/dL Final    Albumin 07/25/2022 3.7  3.5 - 5.2 g/dL Final    GFR Non- 07/25/2022 50 (A)  >60 mL/min Final    GFR  07/25/2022 >60  >60 mL/min Final    GFR Comment 07/25/2022        Final    Comment: Average GFR for 79or more years old:   76 mL/min/1.73sq m  Chronic Kidney Disease:   <60 mL/min/1.73sq m  Kidney failure:   <15 mL/min/1.73sq m              eGFR calculated using average adult body mass.  Additional eGFR calculator available at:        GoInstant.br            Magnesium 07/25/2022 1.8  1.6 - 2.6 mg/dL Final    Phosphorus 07/25/2022 2.6  2.5 - 4.5 mg/dL Final    Amylase 07/25/2022 34  28 - 100 U/L Final    Lipase 07/25/2022 17  13 - 60 U/L Final    WBC 07/25/2022 9.6  3.5 - 11.3 k/uL Final    RBC 07/25/2022 4.62  4.21 - 5.77 m/uL Final    Hemoglobin 07/25/2022 14.6  13.0 - 17.0 g/dL Final    Hematocrit 07/25/2022 43.0  40.7 - 50.3 % Final    MCV 07/25/2022 93.1  82.6 - 102.9 fL Final    MCH 07/25/2022 31.6  25.2 - 33.5 pg Final    MCHC 07/25/2022 34.0  28.4 - 34.8 g/dL Final    RDW 07/25/2022 12.4  11.8 - 14.4 % Final    Platelets 87/42/2531 138  138 - 453 k/uL Final    MPV 07/25/2022 11.4  8.1 - 13.5 fL Final    NRBC Automated 07/25/2022 0.0  0.0 per 100 WBC Final    Seg Neutrophils 07/25/2022 87 (A)  36 - 66 % Final    Lymphocytes 07/25/2022 7 (A)  24 - 44 % Final    Monocytes 07/25/2022 6  1 - 7 % Final    Eosinophils % 07/25/2022 0 (A)  1 - 4 % Final    Basophils 07/25/2022 0  % Final    Immature Granulocytes 07/25/2022 0  0 % Final    Segs Absolute 07/25/2022 8.35 (A)  1.8 - 7.7 k/uL Final    Absolute Lymph # 07/25/2022 0.67 (A)  1.0 - 4.8 k/uL Final    Absolute Mono # 07/25/2022 0.58  0.2 - 0.8 k/uL Final    Absolute Eos # 07/25/2022 0.00  0.0 - 0.4 k/uL Final    Basophils Comment: Average GFR for 79or more years old:   76 mL/min/1.73sq m  Chronic Kidney Disease:   <60 mL/min/1.73sq m  Kidney failure:   <15 mL/min/1.73sq m              eGFR calculated using average adult body mass.  Additional eGFR calculator available at:        [email or web address].br            WBC 07/27/2022 5.0  3.5 - 11.3 k/uL Final    RBC 07/27/2022 4.62  4.21 - 5.77 m/uL Final    Hemoglobin 07/27/2022 14.5  13.0 - 17.0 g/dL Final    Hematocrit 07/27/2022 42.8  40.7 - 50.3 % Final    MCV 07/27/2022 92.6  82.6 - 102.9 fL Final    MCH 07/27/2022 31.4  25.2 - 33.5 pg Final    MCHC 07/27/2022 33.9  28.4 - 34.8 g/dL Final    RDW 07/27/2022 12.2  11.8 - 14.4 % Final    Platelets 51/12/0750 128 (A)  138 - 453 k/uL Final    MPV 07/27/2022 11.1  8.1 - 13.5 fL Final    NRBC Automated 07/27/2022 0.0  0.0 per 100 WBC Final    Seg Neutrophils 07/27/2022 65  36 - 65 % Final    Lymphocytes 07/27/2022 19 (A)  24 - 43 % Final    Monocytes 07/27/2022 11  3 - 12 % Final    Eosinophils % 07/27/2022 4  1 - 4 % Final    Basophils 07/27/2022 1  0 - 2 % Final    Immature Granulocytes 07/27/2022 1 (A)  0 % Final    Segs Absolute 07/27/2022 3.23  1.50 - 8.10 k/uL Final    Absolute Lymph # 07/27/2022 0.96 (A)  1.10 - 3.70 k/uL Final    Absolute Mono # 07/27/2022 0.54  0.10 - 1.20 k/uL Final    Absolute Eos # 07/27/2022 0.18  0.00 - 0.44 k/uL Final    Basophils Absolute 07/27/2022 0.04  0.00 - 0.20 k/uL Final    Absolute Immature Granulocyte 07/27/2022 0.03  0.00 - 0.30 k/uL Final    Glucose 07/27/2022 108 (A)  70 - 99 mg/dL Final    BUN 07/27/2022 14  8 - 23 mg/dL Final    Creatinine 07/27/2022 1.11  0.70 - 1.20 mg/dL Final    Bun/Cre Ratio 07/27/2022 13  9 - 20 Final    Calcium 07/27/2022 8.7  8.6 - 10.4 mg/dL Final    Sodium 07/27/2022 139  135 - 144 mmol/L Final    Potassium 07/27/2022 4.3  3.7 - 5.3 mmol/L Final    Chloride 07/27/2022 106  98 - 107 mmol/L Final    CO2 07/27/2022 25  20 - 31 mmol/L Final Anion Gap 07/27/2022 8 (A)  9 - 17 mmol/L Final    GFR Non- 07/27/2022 >60  >60 mL/min Final    GFR  07/27/2022 >60  >60 mL/min Final    GFR Comment 07/27/2022        Final    Comment: Average GFR for 79or more years old:   76 mL/min/1.73sq m  Chronic Kidney Disease:   <60 mL/min/1.73sq m  Kidney failure:   <15 mL/min/1.73sq m              eGFR calculated using average adult body mass. Additional eGFR calculator available at:        [employer].br             No results found for this visit on 10/31/22. Completed Orders/Prescriptions   Orders Placed This Encounter   Medications    sertraline (ZOLOFT) 50 MG tablet     Sig: Take 1 tablet by mouth daily     Dispense:  90 tablet     Refill:  2                 AssessmentPlan/Medical Decision Making     1. Current severe episode of major depressive disorder without psychotic features without prior episode (HCC)    - Stable  - sertraline (ZOLOFT) 50 MG tablet; Take 1 tablet by mouth daily  Dispense: 90 tablet; Refill: 2    2. Flu vaccine need    - Influenza, FLUAD, (age 72 y+), IM, Preservative Free, 0.5 mL    Return in about 6 months (around 4/30/2023) for chronic conditions. 1.  Vincent received counseling on the following healthy behaviors: medication adherence  2. Patient given educational materials - see patient instructions  3. Was a self-tracking handout given in paper form or via [employer]? No  If yes, see orders or list here. 4.  Discussed use, benefit, and side effects of prescribed medications. Barriers to medication compliance addressed. All patient questions answered. Pt voiced understanding. 5.  Reviewed prior labs, imaging, consultation, follow up, and health maintenance  6. Continue current medications, diet and exercise. 7. Discussed use, benefit, and side effects of prescribed medications. Barriers to medication compliance addressed. All her questions were answered.   Pt voiced understanding. Dar Sim will continue current medications, diet and exercise. Of the  20  minute duration appointment visit, Karen Sheldon CNP spent at least 50% of the face-to-face time in counseling, explanation of diagnosis, planning of further management, and answering all questions.           Signed:  Karen Sheldon CNP

## 2023-07-20 ENCOUNTER — OFFICE VISIT (OUTPATIENT)
Dept: FAMILY MEDICINE CLINIC | Age: 78
End: 2023-07-20

## 2023-07-20 VITALS
BODY MASS INDEX: 29.42 KG/M2 | HEART RATE: 70 BPM | OXYGEN SATURATION: 98 % | HEIGHT: 75 IN | WEIGHT: 236.6 LBS | SYSTOLIC BLOOD PRESSURE: 138 MMHG | TEMPERATURE: 97.3 F | DIASTOLIC BLOOD PRESSURE: 82 MMHG

## 2023-07-20 DIAGNOSIS — F32.2 CURRENT SEVERE EPISODE OF MAJOR DEPRESSIVE DISORDER WITHOUT PSYCHOTIC FEATURES WITHOUT PRIOR EPISODE (HCC): ICD-10-CM

## 2023-07-20 DIAGNOSIS — I10 ESSENTIAL HYPERTENSION: Primary | Chronic | ICD-10-CM

## 2023-07-20 DIAGNOSIS — G47.30 SLEEP APNEA, UNSPECIFIED TYPE: Chronic | ICD-10-CM

## 2023-07-20 DIAGNOSIS — Z12.5 SCREENING PSA (PROSTATE SPECIFIC ANTIGEN): ICD-10-CM

## 2023-07-20 RX ORDER — LISINOPRIL AND HYDROCHLOROTHIAZIDE 12.5; 1 MG/1; MG/1
1 TABLET ORAL DAILY
Qty: 90 TABLET | Refills: 1 | Status: SHIPPED | OUTPATIENT
Start: 2023-07-20

## 2023-07-20 SDOH — ECONOMIC STABILITY: FOOD INSECURITY: WITHIN THE PAST 12 MONTHS, THE FOOD YOU BOUGHT JUST DIDN'T LAST AND YOU DIDN'T HAVE MONEY TO GET MORE.: NEVER TRUE

## 2023-07-20 SDOH — ECONOMIC STABILITY: HOUSING INSECURITY
IN THE LAST 12 MONTHS, WAS THERE A TIME WHEN YOU DID NOT HAVE A STEADY PLACE TO SLEEP OR SLEPT IN A SHELTER (INCLUDING NOW)?: NO

## 2023-07-20 SDOH — ECONOMIC STABILITY: FOOD INSECURITY: WITHIN THE PAST 12 MONTHS, YOU WORRIED THAT YOUR FOOD WOULD RUN OUT BEFORE YOU GOT MONEY TO BUY MORE.: NEVER TRUE

## 2023-07-20 SDOH — ECONOMIC STABILITY: INCOME INSECURITY: HOW HARD IS IT FOR YOU TO PAY FOR THE VERY BASICS LIKE FOOD, HOUSING, MEDICAL CARE, AND HEATING?: NOT HARD AT ALL

## 2023-07-20 ASSESSMENT — PATIENT HEALTH QUESTIONNAIRE - PHQ9
9. THOUGHTS THAT YOU WOULD BE BETTER OFF DEAD, OR OF HURTING YOURSELF: 0
SUM OF ALL RESPONSES TO PHQ QUESTIONS 1-9: 2
3. TROUBLE FALLING OR STAYING ASLEEP: 0
8. MOVING OR SPEAKING SO SLOWLY THAT OTHER PEOPLE COULD HAVE NOTICED. OR THE OPPOSITE, BEING SO FIGETY OR RESTLESS THAT YOU HAVE BEEN MOVING AROUND A LOT MORE THAN USUAL: 0
5. POOR APPETITE OR OVEREATING: 0
1. LITTLE INTEREST OR PLEASURE IN DOING THINGS: 0
4. FEELING TIRED OR HAVING LITTLE ENERGY: 0
10. IF YOU CHECKED OFF ANY PROBLEMS, HOW DIFFICULT HAVE THESE PROBLEMS MADE IT FOR YOU TO DO YOUR WORK, TAKE CARE OF THINGS AT HOME, OR GET ALONG WITH OTHER PEOPLE: 1
SUM OF ALL RESPONSES TO PHQ QUESTIONS 1-9: 2
SUM OF ALL RESPONSES TO PHQ9 QUESTIONS 1 & 2: 2
6. FEELING BAD ABOUT YOURSELF - OR THAT YOU ARE A FAILURE OR HAVE LET YOURSELF OR YOUR FAMILY DOWN: 0
7. TROUBLE CONCENTRATING ON THINGS, SUCH AS READING THE NEWSPAPER OR WATCHING TELEVISION: 0
2. FEELING DOWN, DEPRESSED OR HOPELESS: 2
SUM OF ALL RESPONSES TO PHQ QUESTIONS 1-9: 2
SUM OF ALL RESPONSES TO PHQ QUESTIONS 1-9: 2

## 2023-07-20 NOTE — PROGRESS NOTES
[place], [address]  [address] [facility]. Yosef [place], [address]  [phone]  [phone]    Mac Aguirre is a 69 y.o. male who is here with c/o of:    Chief Complaint: Depression (Discuss possible counseling) and Skin Lesion (Left cheek)      Patient Accompanied by: n/a    HPI - Mac Aguirre is here today with c/o:    Patient here for re evaluation of chronic conditions. HTN-  Compliant with medications. Does not check blood pressure at home. Denies chest pain, dizziness, shortness of breath, headaches or swelling. TIMBO-  Compliant with CPAP. Averages 8-9 hours. Depression-  Only takes medication twice weekly. He doesn't think it helps even when he did take the medication regularly. He feels his mood is stable. Denies SI or HI.       Health Maintenance Due   Topic Date Due    Annual Wellness Visit (AWV)  Never done    COVID-19 Vaccine (4 - Booster for Hernando series) 10/13/2022    Prostate Specific Antigen (PSA) Screening or Monitoring  04/20/2023        Patient Active Problem List:     Erectile dysfunction     Sleep apnea     Osteoarthritis     Hernia     BPH (benign prostatic hyperplasia)     Essential hypertension     Tubular adenoma     CKD (chronic kidney disease)     Past Medical History:   Diagnosis Date    Diverticulosis     DJD (degenerative joint disease)     Erectile dysfunction     Hemorrhoids     Hernia     Hypertension     Osteoarthritis     Sleep apnea     Tubular adenoma of colon       Past Surgical History:   Procedure Laterality Date    ABDOMINAL EXPLORATION SURGERY  07/24/2022    EXPLORATORY LAPAROTOMY LYSIS OF ADHESIONS UMBILICAL HERNIA REPAIR WITHOUT MESH by Dr. Andrew Chauhan  2013; due 2018    COLONOSCOPY N/A 07/07/2022    COLONOSCOPY DIAGNOSTIC performed by Elizabeth Faulkner MD at [address]      rt. inguinal with mesh    JOINT REPLACEMENT  right knee    LAPAROTOMY N/A 7/24/2022    EXPLORATORY LAPAROTOMY LYSIS OF

## 2023-08-16 ENCOUNTER — HOSPITAL ENCOUNTER (OUTPATIENT)
Age: 78
Setting detail: SPECIMEN
Discharge: HOME | End: 2023-08-16

## 2023-08-16 DIAGNOSIS — Z12.5 SCREENING PSA (PROSTATE SPECIFIC ANTIGEN): ICD-10-CM

## 2023-08-16 LAB — PSA SERPL-MCNC: 7.98 NG/ML

## 2023-08-22 ENCOUNTER — HOSPITAL ENCOUNTER (OUTPATIENT)
Age: 78
Setting detail: SPECIMEN
Discharge: HOME | End: 2023-08-22

## 2023-08-22 ENCOUNTER — OFFICE VISIT (OUTPATIENT)
Dept: FAMILY MEDICINE CLINIC | Age: 78
End: 2023-08-22
Payer: MEDICARE

## 2023-08-22 VITALS
TEMPERATURE: 97.2 F | SYSTOLIC BLOOD PRESSURE: 130 MMHG | OXYGEN SATURATION: 91 % | BODY MASS INDEX: 28.95 KG/M2 | HEART RATE: 61 BPM | DIASTOLIC BLOOD PRESSURE: 60 MMHG | WEIGHT: 231.6 LBS

## 2023-08-22 DIAGNOSIS — K21.9 GASTROESOPHAGEAL REFLUX DISEASE WITHOUT ESOPHAGITIS: Primary | ICD-10-CM

## 2023-08-22 DIAGNOSIS — R19.7 DIARRHEA, UNSPECIFIED TYPE: ICD-10-CM

## 2023-08-22 DIAGNOSIS — K21.9 GASTROESOPHAGEAL REFLUX DISEASE WITHOUT ESOPHAGITIS: ICD-10-CM

## 2023-08-22 LAB
ALBUMIN SERPL-MCNC: 4.3 G/DL (ref 3.5–5.2)
ALBUMIN/GLOB SERPL: 1.7 {RATIO} (ref 1–2.5)
ALP SERPL-CCNC: 90 U/L (ref 40–129)
ALT SERPL-CCNC: 16 U/L (ref 5–41)
ANION GAP SERPL CALCULATED.3IONS-SCNC: 14 MMOL/L (ref 9–17)
AST SERPL-CCNC: 25 U/L
BASOPHILS # BLD: 0 K/UL (ref 0–0.2)
BASOPHILS NFR BLD: 0 % (ref 0–2)
BILIRUB SERPL-MCNC: 0.7 MG/DL (ref 0.3–1.2)
BUN SERPL-MCNC: 31 MG/DL (ref 8–23)
CALCIUM SERPL-MCNC: 9.8 MG/DL (ref 8.6–10.4)
CHLORIDE SERPL-SCNC: 103 MMOL/L (ref 98–107)
CO2 SERPL-SCNC: 24 MMOL/L (ref 20–31)
CREAT SERPL-MCNC: 1.5 MG/DL (ref 0.7–1.2)
EOSINOPHIL # BLD: 2.52 K/UL (ref 0–0.4)
EOSINOPHILS RELATIVE PERCENT: 24 % (ref 1–4)
ERYTHROCYTE [DISTWIDTH] IN BLOOD BY AUTOMATED COUNT: 13.2 % (ref 11.8–14.4)
GFR SERPL CREATININE-BSD FRML MDRD: 48 ML/MIN/1.73M2
GLUCOSE SERPL-MCNC: 93 MG/DL (ref 70–99)
HCT VFR BLD AUTO: 46.5 % (ref 40.7–50.3)
HGB BLD-MCNC: 15.9 G/DL (ref 13–17)
IMM GRANULOCYTES # BLD AUTO: 0 K/UL (ref 0–0.3)
IMM GRANULOCYTES NFR BLD: 0 %
LYMPHOCYTES NFR BLD: 1.79 K/UL (ref 1–4.8)
LYMPHOCYTES RELATIVE PERCENT: 17 % (ref 24–44)
MCH RBC QN AUTO: 31.9 PG (ref 25.2–33.5)
MCHC RBC AUTO-ENTMCNC: 34.2 G/DL (ref 28.4–34.8)
MCV RBC AUTO: 93.2 FL (ref 82.6–102.9)
MONOCYTES NFR BLD: 0.84 K/UL (ref 0.1–0.8)
MONOCYTES NFR BLD: 8 % (ref 1–7)
MORPHOLOGY: NORMAL
NEUTROPHILS NFR BLD: 51 % (ref 36–66)
NEUTS SEG NFR BLD: 5.35 K/UL (ref 1.8–7.7)
NRBC BLD-RTO: 0 PER 100 WBC
PLATELET # BLD AUTO: 136 K/UL (ref 138–453)
PMV BLD AUTO: 12.1 FL (ref 8.1–13.5)
POTASSIUM SERPL-SCNC: 3.8 MMOL/L (ref 3.7–5.3)
PROT SERPL-MCNC: 6.9 G/DL (ref 6.4–8.3)
RBC # BLD AUTO: 4.99 M/UL (ref 4.21–5.77)
SODIUM SERPL-SCNC: 141 MMOL/L (ref 135–144)
TSH SERPL DL<=0.05 MIU/L-ACNC: 2.47 UIU/ML (ref 0.3–5)
WBC OTHER # BLD: 10.5 K/UL (ref 3.5–11.3)

## 2023-08-22 PROCEDURE — 1036F TOBACCO NON-USER: CPT | Performed by: NURSE PRACTITIONER

## 2023-08-22 PROCEDURE — 99214 OFFICE O/P EST MOD 30 MIN: CPT | Performed by: NURSE PRACTITIONER

## 2023-08-22 PROCEDURE — 1123F ACP DISCUSS/DSCN MKR DOCD: CPT | Performed by: NURSE PRACTITIONER

## 2023-08-22 PROCEDURE — G8417 CALC BMI ABV UP PARAM F/U: HCPCS | Performed by: NURSE PRACTITIONER

## 2023-08-22 PROCEDURE — G8427 DOCREV CUR MEDS BY ELIG CLIN: HCPCS | Performed by: NURSE PRACTITIONER

## 2023-08-22 PROCEDURE — 3075F SYST BP GE 130 - 139MM HG: CPT | Performed by: NURSE PRACTITIONER

## 2023-08-22 PROCEDURE — 3078F DIAST BP <80 MM HG: CPT | Performed by: NURSE PRACTITIONER

## 2023-08-22 RX ORDER — ACETAMINOPHEN 500 MG
1 TABLET ORAL DAILY
Qty: 30 CAPSULE | Refills: 2 | Status: SHIPPED | OUTPATIENT
Start: 2023-08-22

## 2023-08-22 RX ORDER — FAMOTIDINE 20 MG/1
20 TABLET, FILM COATED ORAL 2 TIMES DAILY
Qty: 60 TABLET | Refills: 3 | Status: SHIPPED | OUTPATIENT
Start: 2023-08-22

## 2023-08-22 ASSESSMENT — PATIENT HEALTH QUESTIONNAIRE - PHQ9
SUM OF ALL RESPONSES TO PHQ QUESTIONS 1-9: 0
1. LITTLE INTEREST OR PLEASURE IN DOING THINGS: 0
9. THOUGHTS THAT YOU WOULD BE BETTER OFF DEAD, OR OF HURTING YOURSELF: 0
3. TROUBLE FALLING OR STAYING ASLEEP: 0
2. FEELING DOWN, DEPRESSED OR HOPELESS: 0
4. FEELING TIRED OR HAVING LITTLE ENERGY: 0
6. FEELING BAD ABOUT YOURSELF - OR THAT YOU ARE A FAILURE OR HAVE LET YOURSELF OR YOUR FAMILY DOWN: 0
10. IF YOU CHECKED OFF ANY PROBLEMS, HOW DIFFICULT HAVE THESE PROBLEMS MADE IT FOR YOU TO DO YOUR WORK, TAKE CARE OF THINGS AT HOME, OR GET ALONG WITH OTHER PEOPLE: 0
5. POOR APPETITE OR OVEREATING: 0
SUM OF ALL RESPONSES TO PHQ QUESTIONS 1-9: 0
8. MOVING OR SPEAKING SO SLOWLY THAT OTHER PEOPLE COULD HAVE NOTICED. OR THE OPPOSITE, BEING SO FIGETY OR RESTLESS THAT YOU HAVE BEEN MOVING AROUND A LOT MORE THAN USUAL: 0
SUM OF ALL RESPONSES TO PHQ9 QUESTIONS 1 & 2: 0
SUM OF ALL RESPONSES TO PHQ QUESTIONS 1-9: 0
SUM OF ALL RESPONSES TO PHQ QUESTIONS 1-9: 0
7. TROUBLE CONCENTRATING ON THINGS, SUCH AS READING THE NEWSPAPER OR WATCHING TELEVISION: 0

## 2023-08-22 ASSESSMENT — ENCOUNTER SYMPTOMS: DIARRHEA: 1

## 2023-08-22 NOTE — PROGRESS NOTES
[place], [address]  [address] [facility]. [facility], [address]  [phone]  [phone]    Jaylin Chou is a 69 y.o. male who is here with c/o of:    Chief Complaint: Gastroesophageal Reflux (Also pt c/o Arthritis in Lt ankle and Rt Shoulder  )      Patient Accompanied by: n/a    HPI - Jaylin Chou is here today with c/o:    GERD  Paitent complains of heartburn. This has been associated with heartburn and diarrhea. He denies belching, chest pain, difficulty swallowing, fullness after meals, hoarseness, nausea, shortness of breath, and unexpected weight loss. Symptoms have been present for 3 weeks. He denies dysphagia. He has not lost weight. He denies melena, hematochezia, hematemesis, and coffee ground emesis. Medical therapy in the past has included none. Has recently cut back on candy/chocolate and diet coke without relief. Drinks one cup coffee daily. He does eat late at night.        Health Maintenance Due   Topic Date Due    Annual Wellness Visit (AWV)  Never done    COVID-19 Vaccine (4 - Booster for Hernando series) 10/13/2022    Flu vaccine (1) 08/01/2023        Patient Active Problem List:     Erectile dysfunction     Sleep apnea     Osteoarthritis     Hernia     BPH (benign prostatic hyperplasia)     Essential hypertension     Tubular adenoma     CKD (chronic kidney disease)     Current severe episode of major depressive disorder without psychotic features without prior episode [facility])     Past Medical History:   Diagnosis Date    Diverticulosis     DJD (degenerative joint disease)     Erectile dysfunction     Hemorrhoids     Hernia     Hypertension     Osteoarthritis     Sleep apnea     Tubular adenoma of colon       Past Surgical History:   Procedure Laterality Date    ABDOMINAL EXPLORATION SURGERY  07/24/2022    EXPLORATORY LAPAROTOMY LYSIS OF ADHESIONS UMBILICAL HERNIA REPAIR WITHOUT MESH by Dr. Bishop Ramirez  2013; due 2018    COLONOSCOPY N/A

## 2023-08-23 DIAGNOSIS — E86.0 DEHYDRATION: Primary | ICD-10-CM

## 2023-09-14 ENCOUNTER — OFFICE VISIT (OUTPATIENT)
Dept: FAMILY MEDICINE CLINIC | Age: 78
End: 2023-09-14
Payer: MEDICARE

## 2023-09-14 VITALS
DIASTOLIC BLOOD PRESSURE: 72 MMHG | HEART RATE: 79 BPM | TEMPERATURE: 97.1 F | BODY MASS INDEX: 29.62 KG/M2 | WEIGHT: 237 LBS | SYSTOLIC BLOOD PRESSURE: 110 MMHG | OXYGEN SATURATION: 93 %

## 2023-09-14 DIAGNOSIS — K21.9 GASTROESOPHAGEAL REFLUX DISEASE WITHOUT ESOPHAGITIS: ICD-10-CM

## 2023-09-14 DIAGNOSIS — Z09 FOLLOW-UP EXAM AFTER TREATMENT: Primary | ICD-10-CM

## 2023-09-14 DIAGNOSIS — R19.7 DIARRHEA, UNSPECIFIED TYPE: ICD-10-CM

## 2023-09-14 PROCEDURE — 1036F TOBACCO NON-USER: CPT | Performed by: NURSE PRACTITIONER

## 2023-09-14 PROCEDURE — 3074F SYST BP LT 130 MM HG: CPT | Performed by: NURSE PRACTITIONER

## 2023-09-14 PROCEDURE — 1123F ACP DISCUSS/DSCN MKR DOCD: CPT | Performed by: NURSE PRACTITIONER

## 2023-09-14 PROCEDURE — G8427 DOCREV CUR MEDS BY ELIG CLIN: HCPCS | Performed by: NURSE PRACTITIONER

## 2023-09-14 PROCEDURE — 3078F DIAST BP <80 MM HG: CPT | Performed by: NURSE PRACTITIONER

## 2023-09-14 PROCEDURE — 99213 OFFICE O/P EST LOW 20 MIN: CPT | Performed by: NURSE PRACTITIONER

## 2023-09-14 PROCEDURE — G8417 CALC BMI ABV UP PARAM F/U: HCPCS | Performed by: NURSE PRACTITIONER

## 2023-09-14 ASSESSMENT — PATIENT HEALTH QUESTIONNAIRE - PHQ9
7. TROUBLE CONCENTRATING ON THINGS, SUCH AS READING THE NEWSPAPER OR WATCHING TELEVISION: 0
SUM OF ALL RESPONSES TO PHQ QUESTIONS 1-9: 0
2. FEELING DOWN, DEPRESSED OR HOPELESS: 0
9. THOUGHTS THAT YOU WOULD BE BETTER OFF DEAD, OR OF HURTING YOURSELF: 0
SUM OF ALL RESPONSES TO PHQ QUESTIONS 1-9: 0
SUM OF ALL RESPONSES TO PHQ9 QUESTIONS 1 & 2: 0
4. FEELING TIRED OR HAVING LITTLE ENERGY: 0
6. FEELING BAD ABOUT YOURSELF - OR THAT YOU ARE A FAILURE OR HAVE LET YOURSELF OR YOUR FAMILY DOWN: 0
1. LITTLE INTEREST OR PLEASURE IN DOING THINGS: 0
5. POOR APPETITE OR OVEREATING: 0
8. MOVING OR SPEAKING SO SLOWLY THAT OTHER PEOPLE COULD HAVE NOTICED. OR THE OPPOSITE, BEING SO FIGETY OR RESTLESS THAT YOU HAVE BEEN MOVING AROUND A LOT MORE THAN USUAL: 0
3. TROUBLE FALLING OR STAYING ASLEEP: 0
SUM OF ALL RESPONSES TO PHQ QUESTIONS 1-9: 0
10. IF YOU CHECKED OFF ANY PROBLEMS, HOW DIFFICULT HAVE THESE PROBLEMS MADE IT FOR YOU TO DO YOUR WORK, TAKE CARE OF THINGS AT HOME, OR GET ALONG WITH OTHER PEOPLE: 0
SUM OF ALL RESPONSES TO PHQ QUESTIONS 1-9: 0

## 2023-09-14 NOTE — PROGRESS NOTES
Final    Albumin 08/22/2023 4.3  3.5 - 5.2 g/dL Final    Albumin/Globulin Ratio 08/22/2023 1.7  1.0 - 2.5 Final    TSH 08/22/2023 2.47  0.30 - 5.00 uIU/mL Final     No results found for this visit on 09/14/23. Completed Orders/Prescriptions   No orders of the defined types were placed in this encounter. AssessmentPlan/Medical Decision Making     1. Follow-up exam after treatment    2. Gastroesophageal reflux disease without esophagitis    - Improved  - Continue Pepcid    3. Diarrhea, unspecified type    - Improved  - Continue Probiotic      Return for as scheduled. 1.  Vincent received counseling on the following healthy behaviors: nutrition and medication adherence  2. Patient given educational materials - see patient instructions  3. Was a self-tracking handout given in paper form or via [employer]? No  If yes, see orders or list here. 4.  Discussed use, benefit, and side effects of prescribed medications. Barriers to medication compliance addressed. All patient questions answered. Pt voiced understanding. 5.  Reviewed prior labs, imaging, consultation, follow up, and health maintenance  6. Continue current medications, diet and exercise. 7. Discussed use, benefit, and side effects of prescribed medications. Barriers to medication compliance addressed. All her questions were answered. Pt voiced understanding. Gerardo Aguilera will continue current medications, diet and exercise. Of the  30  minute duration appointment visit, Zeenat Cruz CNP spent at least 50% of the face-to-face time in counseling, explanation of diagnosis, planning of further management, and answering all questions.           Signed:  Zeenat Cruz CNP

## 2023-10-16 ENCOUNTER — OFFICE VISIT (OUTPATIENT)
Dept: FAMILY MEDICINE CLINIC | Age: 78
End: 2023-10-16
Payer: MEDICARE

## 2023-10-16 VITALS
SYSTOLIC BLOOD PRESSURE: 122 MMHG | OXYGEN SATURATION: 93 % | TEMPERATURE: 97.1 F | BODY MASS INDEX: 29.5 KG/M2 | WEIGHT: 236 LBS | HEART RATE: 73 BPM | DIASTOLIC BLOOD PRESSURE: 70 MMHG

## 2023-10-16 DIAGNOSIS — Z23 INFLUENZA VACCINE NEEDED: ICD-10-CM

## 2023-10-16 DIAGNOSIS — Z00.00 INITIAL MEDICARE ANNUAL WELLNESS VISIT: Primary | ICD-10-CM

## 2023-10-16 DIAGNOSIS — Z23 IMMUNIZATION DUE: ICD-10-CM

## 2023-10-16 PROCEDURE — 90694 VACC AIIV4 NO PRSRV 0.5ML IM: CPT | Performed by: NURSE PRACTITIONER

## 2023-10-16 PROCEDURE — 3078F DIAST BP <80 MM HG: CPT | Performed by: NURSE PRACTITIONER

## 2023-10-16 PROCEDURE — G0008 ADMIN INFLUENZA VIRUS VAC: HCPCS | Performed by: NURSE PRACTITIONER

## 2023-10-16 PROCEDURE — G8484 FLU IMMUNIZE NO ADMIN: HCPCS | Performed by: NURSE PRACTITIONER

## 2023-10-16 PROCEDURE — G0009 ADMIN PNEUMOCOCCAL VACCINE: HCPCS | Performed by: NURSE PRACTITIONER

## 2023-10-16 PROCEDURE — 90677 PCV20 VACCINE IM: CPT | Performed by: NURSE PRACTITIONER

## 2023-10-16 PROCEDURE — 3074F SYST BP LT 130 MM HG: CPT | Performed by: NURSE PRACTITIONER

## 2023-10-16 PROCEDURE — G0438 PPPS, INITIAL VISIT: HCPCS | Performed by: NURSE PRACTITIONER

## 2023-10-16 PROCEDURE — 1123F ACP DISCUSS/DSCN MKR DOCD: CPT | Performed by: NURSE PRACTITIONER

## 2023-10-16 ASSESSMENT — PATIENT HEALTH QUESTIONNAIRE - PHQ9
6. FEELING BAD ABOUT YOURSELF - OR THAT YOU ARE A FAILURE OR HAVE LET YOURSELF OR YOUR FAMILY DOWN: 0
7. TROUBLE CONCENTRATING ON THINGS, SUCH AS READING THE NEWSPAPER OR WATCHING TELEVISION: 0
SUM OF ALL RESPONSES TO PHQ9 QUESTIONS 1 & 2: 0
9. THOUGHTS THAT YOU WOULD BE BETTER OFF DEAD, OR OF HURTING YOURSELF: 0
SUM OF ALL RESPONSES TO PHQ QUESTIONS 1-9: 0
4. FEELING TIRED OR HAVING LITTLE ENERGY: 0
1. LITTLE INTEREST OR PLEASURE IN DOING THINGS: 0
8. MOVING OR SPEAKING SO SLOWLY THAT OTHER PEOPLE COULD HAVE NOTICED. OR THE OPPOSITE, BEING SO FIGETY OR RESTLESS THAT YOU HAVE BEEN MOVING AROUND A LOT MORE THAN USUAL: 0
2. FEELING DOWN, DEPRESSED OR HOPELESS: 0
5. POOR APPETITE OR OVEREATING: 0
3. TROUBLE FALLING OR STAYING ASLEEP: 0
10. IF YOU CHECKED OFF ANY PROBLEMS, HOW DIFFICULT HAVE THESE PROBLEMS MADE IT FOR YOU TO DO YOUR WORK, TAKE CARE OF THINGS AT HOME, OR GET ALONG WITH OTHER PEOPLE: 0
SUM OF ALL RESPONSES TO PHQ QUESTIONS 1-9: 0

## 2023-10-16 ASSESSMENT — LIFESTYLE VARIABLES
HOW OFTEN DO YOU HAVE A DRINK CONTAINING ALCOHOL: 2-3 TIMES A WEEK
HOW MANY STANDARD DRINKS CONTAINING ALCOHOL DO YOU HAVE ON A TYPICAL DAY: 1 OR 2

## 2024-01-22 ENCOUNTER — OFFICE VISIT (OUTPATIENT)
Dept: FAMILY MEDICINE CLINIC | Age: 79
End: 2024-01-22
Payer: MEDICARE

## 2024-01-22 VITALS
DIASTOLIC BLOOD PRESSURE: 102 MMHG | OXYGEN SATURATION: 98 % | HEART RATE: 70 BPM | SYSTOLIC BLOOD PRESSURE: 168 MMHG | TEMPERATURE: 97.5 F | WEIGHT: 236.6 LBS | BODY MASS INDEX: 29.57 KG/M2

## 2024-01-22 DIAGNOSIS — R05.3 POST-COVID CHRONIC COUGH: ICD-10-CM

## 2024-01-22 DIAGNOSIS — U09.9 POST-COVID CHRONIC COUGH: ICD-10-CM

## 2024-01-22 DIAGNOSIS — R05.8 PRODUCTIVE COUGH: ICD-10-CM

## 2024-01-22 DIAGNOSIS — H61.22 IMPACTED CERUMEN OF LEFT EAR: ICD-10-CM

## 2024-01-22 DIAGNOSIS — R05.8 PRODUCTIVE COUGH: Primary | ICD-10-CM

## 2024-01-22 DIAGNOSIS — G47.33 OBSTRUCTIVE SLEEP APNEA SYNDROME: Chronic | ICD-10-CM

## 2024-01-22 DIAGNOSIS — I10 ESSENTIAL HYPERTENSION: Chronic | ICD-10-CM

## 2024-01-22 PROCEDURE — G8417 CALC BMI ABV UP PARAM F/U: HCPCS

## 2024-01-22 PROCEDURE — 69209 REMOVE IMPACTED EAR WAX UNI: CPT

## 2024-01-22 PROCEDURE — G8427 DOCREV CUR MEDS BY ELIG CLIN: HCPCS

## 2024-01-22 PROCEDURE — 1123F ACP DISCUSS/DSCN MKR DOCD: CPT

## 2024-01-22 PROCEDURE — 99214 OFFICE O/P EST MOD 30 MIN: CPT

## 2024-01-22 PROCEDURE — 3077F SYST BP >= 140 MM HG: CPT

## 2024-01-22 PROCEDURE — G8484 FLU IMMUNIZE NO ADMIN: HCPCS

## 2024-01-22 PROCEDURE — 3080F DIAST BP >= 90 MM HG: CPT

## 2024-01-22 PROCEDURE — 1036F TOBACCO NON-USER: CPT

## 2024-01-22 RX ORDER — DOXYCYCLINE HYCLATE 100 MG
100 TABLET ORAL 2 TIMES DAILY
Qty: 14 TABLET | Refills: 0 | Status: SHIPPED | OUTPATIENT
Start: 2024-01-22 | End: 2024-01-29

## 2024-01-22 SDOH — ECONOMIC STABILITY: INCOME INSECURITY: HOW HARD IS IT FOR YOU TO PAY FOR THE VERY BASICS LIKE FOOD, HOUSING, MEDICAL CARE, AND HEATING?: NOT HARD AT ALL

## 2024-01-22 SDOH — ECONOMIC STABILITY: FOOD INSECURITY: WITHIN THE PAST 12 MONTHS, YOU WORRIED THAT YOUR FOOD WOULD RUN OUT BEFORE YOU GOT MONEY TO BUY MORE.: NEVER TRUE

## 2024-01-22 SDOH — ECONOMIC STABILITY: FOOD INSECURITY: WITHIN THE PAST 12 MONTHS, THE FOOD YOU BOUGHT JUST DIDN'T LAST AND YOU DIDN'T HAVE MONEY TO GET MORE.: NEVER TRUE

## 2024-01-22 ASSESSMENT — PATIENT HEALTH QUESTIONNAIRE - PHQ9
6. FEELING BAD ABOUT YOURSELF - OR THAT YOU ARE A FAILURE OR HAVE LET YOURSELF OR YOUR FAMILY DOWN: 1
SUM OF ALL RESPONSES TO PHQ QUESTIONS 1-9: 8
8. MOVING OR SPEAKING SO SLOWLY THAT OTHER PEOPLE COULD HAVE NOTICED. OR THE OPPOSITE, BEING SO FIGETY OR RESTLESS THAT YOU HAVE BEEN MOVING AROUND A LOT MORE THAN USUAL: 0
SUM OF ALL RESPONSES TO PHQ QUESTIONS 1-9: 8
SUM OF ALL RESPONSES TO PHQ9 QUESTIONS 1 & 2: 1
9. THOUGHTS THAT YOU WOULD BE BETTER OFF DEAD, OR OF HURTING YOURSELF: 0
7. TROUBLE CONCENTRATING ON THINGS, SUCH AS READING THE NEWSPAPER OR WATCHING TELEVISION: 0
4. FEELING TIRED OR HAVING LITTLE ENERGY: 3
2. FEELING DOWN, DEPRESSED OR HOPELESS: 1
5. POOR APPETITE OR OVEREATING: 0
SUM OF ALL RESPONSES TO PHQ QUESTIONS 1-9: 8
3. TROUBLE FALLING OR STAYING ASLEEP: 3
1. LITTLE INTEREST OR PLEASURE IN DOING THINGS: 0
SUM OF ALL RESPONSES TO PHQ QUESTIONS 1-9: 8
10. IF YOU CHECKED OFF ANY PROBLEMS, HOW DIFFICULT HAVE THESE PROBLEMS MADE IT FOR YOU TO DO YOUR WORK, TAKE CARE OF THINGS AT HOME, OR GET ALONG WITH OTHER PEOPLE: 1

## 2024-01-22 NOTE — PROGRESS NOTES
Vincent Carrasco (:  1945) is a 78 y.o. male,Established patient, here for evaluation of the following chief complaint(s):  Congestion and Cough          Subjective   SUBJECTIVE/OBJECTIVE:  Pt here today for chronic cough  BP significantly elevated from baseline, took Sudafed containing products last night    Pt reports he had COVID w/ mild sx approx 6 weeks ago  He has had a persistent cough since  He is using OTC medications to control this w/ minimal relief  Admits to intermittent chills but has not checked his temperature    Sputum has remained a dark yellow and thick  Denies SOB, CP          Review of Systems       Objective   Physical Exam  Vitals and nursing note reviewed.   Constitutional:       General: He is not in acute distress.     Appearance: Normal appearance. He is not ill-appearing, toxic-appearing or diaphoretic.   HENT:      Right Ear: Tympanic membrane and ear canal normal.      Left Ear: There is impacted cerumen.   Cardiovascular:      Rate and Rhythm: Normal rate and regular rhythm.   Pulmonary:      Effort: Pulmonary effort is normal.      Breath sounds: Normal breath sounds.   Musculoskeletal:      Right lower leg: No edema.      Left lower leg: No edema.   Neurological:      Mental Status: He is alert.                 ASSESSMENT/PLAN:  1. Productive cough  2. Post-COVID chronic cough  -CXR for eval  -doxy for coverage    -     XR CHEST STANDARD (2 VW); Future  -     doxycycline hyclate (VIBRA-TABS) 100 MG tablet; Take 1 tablet by mouth 2 times daily for 7 days, Disp-14 tablet, R-0Normal    3. Obstructive sleep apnea syndrome  -pt has changed tubing since being acutely ill  -encouraged to keep machine clean    -     doxycycline hyclate (VIBRA-TABS) 100 MG tablet; Take 1 tablet by mouth 2 times daily for 7 days, Disp-14 tablet, R-0Normal    4. Essential hypertension  -significantly elevated from baseline, likely d/t sudafed use  -pt to monitor at home, if remains elevated >140/90 he

## 2024-02-20 ENCOUNTER — OFFICE VISIT (OUTPATIENT)
Dept: FAMILY MEDICINE CLINIC | Age: 79
End: 2024-02-20
Payer: MEDICARE

## 2024-02-20 VITALS
DIASTOLIC BLOOD PRESSURE: 68 MMHG | HEART RATE: 68 BPM | SYSTOLIC BLOOD PRESSURE: 144 MMHG | WEIGHT: 231 LBS | OXYGEN SATURATION: 98 % | BODY MASS INDEX: 28.87 KG/M2 | TEMPERATURE: 97.4 F

## 2024-02-20 DIAGNOSIS — G51.0 BELL PALSY: ICD-10-CM

## 2024-02-20 DIAGNOSIS — I10 ESSENTIAL HYPERTENSION: ICD-10-CM

## 2024-02-20 DIAGNOSIS — Z09 HOSPITAL DISCHARGE FOLLOW-UP: Primary | ICD-10-CM

## 2024-02-20 PROCEDURE — 1123F ACP DISCUSS/DSCN MKR DOCD: CPT | Performed by: NURSE PRACTITIONER

## 2024-02-20 PROCEDURE — G8417 CALC BMI ABV UP PARAM F/U: HCPCS | Performed by: NURSE PRACTITIONER

## 2024-02-20 PROCEDURE — G8427 DOCREV CUR MEDS BY ELIG CLIN: HCPCS | Performed by: NURSE PRACTITIONER

## 2024-02-20 PROCEDURE — 99214 OFFICE O/P EST MOD 30 MIN: CPT | Performed by: NURSE PRACTITIONER

## 2024-02-20 PROCEDURE — 3078F DIAST BP <80 MM HG: CPT | Performed by: NURSE PRACTITIONER

## 2024-02-20 PROCEDURE — 3077F SYST BP >= 140 MM HG: CPT | Performed by: NURSE PRACTITIONER

## 2024-02-20 PROCEDURE — 1036F TOBACCO NON-USER: CPT | Performed by: NURSE PRACTITIONER

## 2024-02-20 PROCEDURE — 1111F DSCHRG MED/CURRENT MED MERGE: CPT | Performed by: NURSE PRACTITIONER

## 2024-02-20 PROCEDURE — G8484 FLU IMMUNIZE NO ADMIN: HCPCS | Performed by: NURSE PRACTITIONER

## 2024-02-20 RX ORDER — PREDNISONE 10 MG/1
60 TABLET ORAL DAILY
COMMUNITY
Start: 2024-02-19 | End: 2024-02-25

## 2024-02-20 RX ORDER — VALACYCLOVIR HYDROCHLORIDE 1 G/1
1000 TABLET, FILM COATED ORAL 3 TIMES DAILY
COMMUNITY
Start: 2024-02-19 | End: 2024-02-26

## 2024-02-20 ASSESSMENT — PATIENT HEALTH QUESTIONNAIRE - PHQ9
4. FEELING TIRED OR HAVING LITTLE ENERGY: 0
2. FEELING DOWN, DEPRESSED OR HOPELESS: 2
SUM OF ALL RESPONSES TO PHQ9 QUESTIONS 1 & 2: 2
8. MOVING OR SPEAKING SO SLOWLY THAT OTHER PEOPLE COULD HAVE NOTICED. OR THE OPPOSITE, BEING SO FIGETY OR RESTLESS THAT YOU HAVE BEEN MOVING AROUND A LOT MORE THAN USUAL: 0
1. LITTLE INTEREST OR PLEASURE IN DOING THINGS: 0
SUM OF ALL RESPONSES TO PHQ QUESTIONS 1-9: 2
SUM OF ALL RESPONSES TO PHQ QUESTIONS 1-9: 2
6. FEELING BAD ABOUT YOURSELF - OR THAT YOU ARE A FAILURE OR HAVE LET YOURSELF OR YOUR FAMILY DOWN: 0
7. TROUBLE CONCENTRATING ON THINGS, SUCH AS READING THE NEWSPAPER OR WATCHING TELEVISION: 0
3. TROUBLE FALLING OR STAYING ASLEEP: 0
10. IF YOU CHECKED OFF ANY PROBLEMS, HOW DIFFICULT HAVE THESE PROBLEMS MADE IT FOR YOU TO DO YOUR WORK, TAKE CARE OF THINGS AT HOME, OR GET ALONG WITH OTHER PEOPLE: 0
SUM OF ALL RESPONSES TO PHQ QUESTIONS 1-9: 2
9. THOUGHTS THAT YOU WOULD BE BETTER OFF DEAD, OR OF HURTING YOURSELF: 0
5. POOR APPETITE OR OVEREATING: 0

## 2024-02-20 NOTE — PROGRESS NOTES
Ella eJrome, [facility]  [address]  [place]  [address]  [phone]  [phone]    Vincent Carrasco is a 78 y.o. male who is here with c/o of:    Chief Complaint: Follow-Up from Hospital (Pt was in [facility] yesterday Dx with McKittrick Palsy per pt)      Patient Accompanied by: {Relatives of adult:[id number]}    HPI - Vincent Carrasco is here today with c/o:      Health Maintenance Due   Topic Date Due    COVID-19 Vaccine (4 - 2023-24 season) 09/01/2023        Patient Active Problem List:     Erectile dysfunction     Sleep apnea     Osteoarthritis     Hernia     BPH (benign prostatic hyperplasia)     Essential hypertension     Tubular adenoma     CKD (chronic kidney disease)     Current severe episode of major depressive disorder without psychotic features without prior episode (HCC)     Past Medical History:   Diagnosis Date    Diverticulosis     DJD (degenerative joint disease)     Erectile dysfunction     Hemorrhoids     Hernia     Hypertension     Osteoarthritis     Sleep apnea     Tubular adenoma of colon       Past Surgical History:   Procedure Laterality Date    ABDOMINAL EXPLORATION SURGERY  07/24/2022    EXPLORATORY LAPAROTOMY LYSIS OF ADHESIONS UMBILICAL HERNIA REPAIR WITHOUT MESH by Dr. Orantes    COLONOSCOPY  2013; due 2018    COLONOSCOPY N/A 07/07/2022    COLONOSCOPY DIAGNOSTIC performed by Amado Calle MD at [facility] OR    HERNIA REPAIR      rt. inguinal with mesh    JOINT REPLACEMENT  right knee    LAPAROTOMY N/A 7/24/2022    EXPLORATORY LAPAROTOMY LYSIS OF ADHESIONS UMBILICAL HERNIA REPAIR WITHOUT MESH performed by Luis Orantes DO at [facility] OR     Family History   Problem Relation Age of Onset    No Known Problems Mother     No Known Problems Father      Social History     Tobacco Use    Smoking status: Never    Smokeless tobacco: Never   Substance Use Topics    Alcohol use: Yes     Comment: social     ALLERGIES:  No Known Allergies       Subjective   Review of 
and neck supple.   Skin:     General: Skin is warm and dry.   Neurological:      Mental Status: He is alert.      Cranial Nerves: Facial asymmetry present.      Motor: No weakness.      Gait: Gait is intact.      Comments: Left side facial droop    Psychiatric:         Mood and Affect: Mood normal.         Behavior: Behavior normal.           An electronic signature was used to authenticate this note.  --Ella Jerome, ROBB - CNP

## 2024-02-27 ENCOUNTER — OFFICE VISIT (OUTPATIENT)
Dept: FAMILY MEDICINE CLINIC | Age: 79
End: 2024-02-27
Payer: MEDICARE

## 2024-02-27 VITALS
TEMPERATURE: 97.2 F | BODY MASS INDEX: 28.72 KG/M2 | OXYGEN SATURATION: 95 % | WEIGHT: 229.8 LBS | SYSTOLIC BLOOD PRESSURE: 120 MMHG | DIASTOLIC BLOOD PRESSURE: 70 MMHG | HEART RATE: 73 BPM

## 2024-02-27 DIAGNOSIS — Z09 FOLLOW-UP EXAM AFTER TREATMENT: Primary | ICD-10-CM

## 2024-02-27 DIAGNOSIS — G51.0 BELL PALSY: ICD-10-CM

## 2024-02-27 DIAGNOSIS — I10 ESSENTIAL HYPERTENSION: ICD-10-CM

## 2024-02-27 PROCEDURE — 3074F SYST BP LT 130 MM HG: CPT | Performed by: NURSE PRACTITIONER

## 2024-02-27 PROCEDURE — 3078F DIAST BP <80 MM HG: CPT | Performed by: NURSE PRACTITIONER

## 2024-02-27 PROCEDURE — 1036F TOBACCO NON-USER: CPT | Performed by: NURSE PRACTITIONER

## 2024-02-27 PROCEDURE — G8427 DOCREV CUR MEDS BY ELIG CLIN: HCPCS | Performed by: NURSE PRACTITIONER

## 2024-02-27 PROCEDURE — 99214 OFFICE O/P EST MOD 30 MIN: CPT | Performed by: NURSE PRACTITIONER

## 2024-02-27 PROCEDURE — G8417 CALC BMI ABV UP PARAM F/U: HCPCS | Performed by: NURSE PRACTITIONER

## 2024-02-27 PROCEDURE — 1123F ACP DISCUSS/DSCN MKR DOCD: CPT | Performed by: NURSE PRACTITIONER

## 2024-02-27 PROCEDURE — G8484 FLU IMMUNIZE NO ADMIN: HCPCS | Performed by: NURSE PRACTITIONER

## 2024-02-27 ASSESSMENT — PATIENT HEALTH QUESTIONNAIRE - PHQ9
SUM OF ALL RESPONSES TO PHQ QUESTIONS 1-9: 0
3. TROUBLE FALLING OR STAYING ASLEEP: 0
5. POOR APPETITE OR OVEREATING: 0
SUM OF ALL RESPONSES TO PHQ QUESTIONS 1-9: 0
9. THOUGHTS THAT YOU WOULD BE BETTER OFF DEAD, OR OF HURTING YOURSELF: 0
10. IF YOU CHECKED OFF ANY PROBLEMS, HOW DIFFICULT HAVE THESE PROBLEMS MADE IT FOR YOU TO DO YOUR WORK, TAKE CARE OF THINGS AT HOME, OR GET ALONG WITH OTHER PEOPLE: 0
7. TROUBLE CONCENTRATING ON THINGS, SUCH AS READING THE NEWSPAPER OR WATCHING TELEVISION: 0
6. FEELING BAD ABOUT YOURSELF - OR THAT YOU ARE A FAILURE OR HAVE LET YOURSELF OR YOUR FAMILY DOWN: 0
8. MOVING OR SPEAKING SO SLOWLY THAT OTHER PEOPLE COULD HAVE NOTICED. OR THE OPPOSITE, BEING SO FIGETY OR RESTLESS THAT YOU HAVE BEEN MOVING AROUND A LOT MORE THAN USUAL: 0
SUM OF ALL RESPONSES TO PHQ QUESTIONS 1-9: 0
SUM OF ALL RESPONSES TO PHQ QUESTIONS 1-9: 0
SUM OF ALL RESPONSES TO PHQ9 QUESTIONS 1 & 2: 0
1. LITTLE INTEREST OR PLEASURE IN DOING THINGS: 0
4. FEELING TIRED OR HAVING LITTLE ENERGY: 0
2. FEELING DOWN, DEPRESSED OR HOPELESS: 0

## 2024-02-27 NOTE — PROGRESS NOTES
Problem Relation Age of Onset    No Known Problems Mother     No Known Problems Father      Social History     Tobacco Use    Smoking status: Never    Smokeless tobacco: Never   Substance Use Topics    Alcohol use: Yes     Comment: social     ALLERGIES:  No Known Allergies       Subjective   Review of Systems   Neurological:  Positive for facial asymmetry.   All other systems reviewed and are negative.      Objective   Physical Exam  Neurological:      Cranial Nerves: Facial asymmetry present.      Comments: Facial droop on right side        Constitutional: Vincent is oriented to person, place, and time. Vital signs are normal. Appears well-developed and well-nourished.   Neck: Full passive range of motion. Non-tender on palpation. Neck supple. No thyromegaly present. Trachea normal.  Cardiovascular: Normal rate, regular rhythm, S1, S2, no murmur, no gallop, no friction rub, intact distal pulses.    Pulmonary/Chest: Breath sounds are clear throughout, No respiratory distress, No wheezing, No chest tenderness. Effort normal  Musculoskeletal: Extremities appear regular and symmetric. No evident masses, lesions, foreign bodies, or other abnormalities. No edema. No tenderness on palpation. Joints are stable. Full ROM, strength and tone are within normal limits.    Skin: Skin is warm, dry and intact. No obvious lesions on exposed skin  Psychiatric: Normal mood and affect. Speech is normal and behavior is normal.     Nursing note and vitals reviewed.  Blood pressure 120/70, pulse 73, temperature 97.2 °F (36.2 °C), temperature source Temporal, weight 104.2 kg (229 lb 12.8 oz), SpO2 95 %.  Body mass index is 28.72 kg/m².    Wt Readings from Last 3 Encounters:   02/27/24 104.2 kg (229 lb 12.8 oz)   02/20/24 104.8 kg (231 lb)   01/22/24 107.3 kg (236 lb 9.6 oz)     BP Readings from Last 3 Encounters:   02/27/24 120/70   02/20/24 (!) 144/68   01/22/24 (!) 168/102       Hospital Outpatient Visit on 08/22/2023   Component Date

## 2024-02-28 ENCOUNTER — TELEPHONE (OUTPATIENT)
Dept: FAMILY MEDICINE CLINIC | Age: 79
End: 2024-02-28

## 2024-02-28 NOTE — TELEPHONE ENCOUNTER
Patient can't get into PT until middle of April. He's requesting a script for a facial massager that he can rent from [employer]. Please advise.

## 2024-03-21 ENCOUNTER — OFFICE VISIT (OUTPATIENT)
Dept: FAMILY MEDICINE CLINIC | Age: 79
End: 2024-03-21
Payer: MEDICARE

## 2024-03-21 VITALS
DIASTOLIC BLOOD PRESSURE: 62 MMHG | HEART RATE: 84 BPM | BODY MASS INDEX: 28.62 KG/M2 | WEIGHT: 229 LBS | SYSTOLIC BLOOD PRESSURE: 98 MMHG | TEMPERATURE: 96.8 F | OXYGEN SATURATION: 98 %

## 2024-03-21 DIAGNOSIS — I10 ESSENTIAL HYPERTENSION: Chronic | ICD-10-CM

## 2024-03-21 PROCEDURE — G8484 FLU IMMUNIZE NO ADMIN: HCPCS | Performed by: NURSE PRACTITIONER

## 2024-03-21 PROCEDURE — 99214 OFFICE O/P EST MOD 30 MIN: CPT | Performed by: NURSE PRACTITIONER

## 2024-03-21 PROCEDURE — 1123F ACP DISCUSS/DSCN MKR DOCD: CPT | Performed by: NURSE PRACTITIONER

## 2024-03-21 PROCEDURE — G8427 DOCREV CUR MEDS BY ELIG CLIN: HCPCS | Performed by: NURSE PRACTITIONER

## 2024-03-21 PROCEDURE — 1036F TOBACCO NON-USER: CPT | Performed by: NURSE PRACTITIONER

## 2024-03-21 PROCEDURE — G8417 CALC BMI ABV UP PARAM F/U: HCPCS | Performed by: NURSE PRACTITIONER

## 2024-03-21 PROCEDURE — 3078F DIAST BP <80 MM HG: CPT | Performed by: NURSE PRACTITIONER

## 2024-03-21 PROCEDURE — 3074F SYST BP LT 130 MM HG: CPT | Performed by: NURSE PRACTITIONER

## 2024-03-21 RX ORDER — LISINOPRIL AND HYDROCHLOROTHIAZIDE 12.5; 1 MG/1; MG/1
1 TABLET ORAL DAILY
Qty: 90 TABLET | Refills: 1 | Status: SHIPPED | OUTPATIENT
Start: 2024-03-21

## 2024-03-21 ASSESSMENT — PATIENT HEALTH QUESTIONNAIRE - PHQ9
4. FEELING TIRED OR HAVING LITTLE ENERGY: SEVERAL DAYS
SUM OF ALL RESPONSES TO PHQ QUESTIONS 1-9: 5
8. MOVING OR SPEAKING SO SLOWLY THAT OTHER PEOPLE COULD HAVE NOTICED. OR THE OPPOSITE, BEING SO FIGETY OR RESTLESS THAT YOU HAVE BEEN MOVING AROUND A LOT MORE THAN USUAL: NOT AT ALL
6. FEELING BAD ABOUT YOURSELF - OR THAT YOU ARE A FAILURE OR HAVE LET YOURSELF OR YOUR FAMILY DOWN: NOT AT ALL
7. TROUBLE CONCENTRATING ON THINGS, SUCH AS READING THE NEWSPAPER OR WATCHING TELEVISION: NOT AT ALL
10. IF YOU CHECKED OFF ANY PROBLEMS, HOW DIFFICULT HAVE THESE PROBLEMS MADE IT FOR YOU TO DO YOUR WORK, TAKE CARE OF THINGS AT HOME, OR GET ALONG WITH OTHER PEOPLE: NOT DIFFICULT AT ALL
SUM OF ALL RESPONSES TO PHQ QUESTIONS 1-9: 5
SUM OF ALL RESPONSES TO PHQ9 QUESTIONS 1 & 2: 2
9. THOUGHTS THAT YOU WOULD BE BETTER OFF DEAD, OR OF HURTING YOURSELF: NOT AT ALL
1. LITTLE INTEREST OR PLEASURE IN DOING THINGS: NOT AT ALL
SUM OF ALL RESPONSES TO PHQ QUESTIONS 1-9: 5
2. FEELING DOWN, DEPRESSED OR HOPELESS: MORE THAN HALF THE DAYS
SUM OF ALL RESPONSES TO PHQ QUESTIONS 1-9: 5
3. TROUBLE FALLING OR STAYING ASLEEP: MORE THAN HALF THE DAYS

## 2024-03-21 NOTE — PROGRESS NOTES
Ella Jerome, [facility]  [address]  [place]  [address]  [phone]  [phone]    Vincent Carrasco is a 78 y.o. male who is here with c/o of:    Chief Complaint: Hypertension      Patient Accompanied by: n/a    HPI - Vincent Carrasco is here today with c/o:    Patient here for follow up HTN.    Averaging 110/80's with Lisinopril-HCTZ 20-25 mg daily.    Denies chest pain, dizziness, shortness of breath, headaches , swelling.     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     He says that his balance is a little off and feels nauseated occasionally      Health Maintenance Due   Topic Date Due    COVID-19 Vaccine (4 - 2023-24 season) 09/01/2023        Patient Active Problem List:     Erectile dysfunction     Sleep apnea     Osteoarthritis     Hernia     BPH (benign prostatic hyperplasia)     Essential hypertension     Tubular adenoma     CKD (chronic kidney disease)     Current severe episode of major depressive disorder without psychotic features without prior episode (HCC)     Past Medical History:   Diagnosis Date    Diverticulosis     DJD (degenerative joint disease)     Erectile dysfunction     Hemorrhoids     Hernia     Hypertension     Osteoarthritis     Sleep apnea     Tubular adenoma of colon       Past Surgical History:   Procedure Laterality Date    ABDOMINAL EXPLORATION SURGERY  07/24/2022    EXPLORATORY LAPAROTOMY LYSIS OF ADHESIONS UMBILICAL HERNIA REPAIR WITHOUT MESH by Dr. Orantes    COLONOSCOPY  2013; due 2018    COLONOSCOPY N/A 07/07/2022    COLONOSCOPY DIAGNOSTIC performed by Amado Calle MD at [facility] OR    HERNIA REPAIR      rt. inguinal with mesh    JOINT REPLACEMENT  right knee    LAPAROTOMY N/A 7/24/2022    EXPLORATORY LAPAROTOMY LYSIS OF ADHESIONS UMBILICAL HERNIA REPAIR WITHOUT MESH performed by Luis Orantes DO at [facility] OR     Family History   Problem Relation Age of Onset    No Known Problems Mother     No Known Problems Father      Social History

## 2024-04-08 ENCOUNTER — HOSPITAL ENCOUNTER (OUTPATIENT)
Age: 79
Setting detail: SPECIMEN
Discharge: HOME | End: 2024-04-08

## 2024-04-08 ENCOUNTER — OFFICE VISIT (OUTPATIENT)
Dept: FAMILY MEDICINE CLINIC | Age: 79
End: 2024-04-08
Payer: MEDICARE

## 2024-04-08 VITALS
SYSTOLIC BLOOD PRESSURE: 122 MMHG | WEIGHT: 227.8 LBS | HEART RATE: 76 BPM | BODY MASS INDEX: 28.47 KG/M2 | OXYGEN SATURATION: 98 % | TEMPERATURE: 97.2 F | DIASTOLIC BLOOD PRESSURE: 82 MMHG

## 2024-04-08 DIAGNOSIS — G47.33 OBSTRUCTIVE SLEEP APNEA SYNDROME: Chronic | ICD-10-CM

## 2024-04-08 DIAGNOSIS — R26.89 BALANCE PROBLEM: ICD-10-CM

## 2024-04-08 DIAGNOSIS — F32.2 CURRENT SEVERE EPISODE OF MAJOR DEPRESSIVE DISORDER WITHOUT PSYCHOTIC FEATURES WITHOUT PRIOR EPISODE (HCC): ICD-10-CM

## 2024-04-08 DIAGNOSIS — L29.9 ITCHY SKIN: ICD-10-CM

## 2024-04-08 DIAGNOSIS — R42 DIZZINESS: Primary | ICD-10-CM

## 2024-04-08 DIAGNOSIS — I10 ESSENTIAL HYPERTENSION: Chronic | ICD-10-CM

## 2024-04-08 LAB
ALBUMIN SERPL-MCNC: 4.4 G/DL (ref 3.5–5.2)
ALBUMIN/GLOB SERPL: 2 {RATIO} (ref 1–2.5)
ALP SERPL-CCNC: 70 U/L (ref 40–129)
ALT SERPL-CCNC: 21 U/L (ref 10–50)
ANION GAP SERPL CALCULATED.3IONS-SCNC: 15 MMOL/L (ref 9–16)
AST SERPL-CCNC: 25 U/L (ref 10–50)
BASOPHILS # BLD: 0.06 K/UL (ref 0–0.2)
BASOPHILS NFR BLD: 1 % (ref 0–2)
BILIRUB SERPL-MCNC: 0.7 MG/DL (ref 0–1.2)
BUN SERPL-MCNC: 26 MG/DL (ref 8–23)
CALCIUM SERPL-MCNC: 9.5 MG/DL (ref 8.6–10.4)
CHLORIDE SERPL-SCNC: 100 MMOL/L (ref 98–107)
CO2 SERPL-SCNC: 25 MMOL/L (ref 20–31)
CREAT SERPL-MCNC: 1.3 MG/DL (ref 0.7–1.2)
EOSINOPHIL # BLD: 0.18 K/UL (ref 0–0.44)
EOSINOPHILS RELATIVE PERCENT: 3 % (ref 1–4)
ERYTHROCYTE [DISTWIDTH] IN BLOOD BY AUTOMATED COUNT: 13.6 % (ref 11.8–14.4)
GFR SERPL CREATININE-BSD FRML MDRD: 55 ML/MIN/1.73M2
GLUCOSE SERPL-MCNC: 81 MG/DL (ref 74–99)
HCT VFR BLD AUTO: 47 % (ref 40.7–50.3)
HGB BLD-MCNC: 16.2 G/DL (ref 13–17)
IMM GRANULOCYTES # BLD AUTO: <0.03 K/UL (ref 0–0.3)
IMM GRANULOCYTES NFR BLD: 0 %
LYMPHOCYTES NFR BLD: 1.21 K/UL (ref 1.1–3.7)
LYMPHOCYTES RELATIVE PERCENT: 21 % (ref 24–43)
MCH RBC QN AUTO: 31.6 PG (ref 25.2–33.5)
MCHC RBC AUTO-ENTMCNC: 34.5 G/DL (ref 28.4–34.8)
MCV RBC AUTO: 91.8 FL (ref 82.6–102.9)
MONOCYTES NFR BLD: 0.54 K/UL (ref 0.1–1.2)
MONOCYTES NFR BLD: 10 % (ref 3–12)
NEUTROPHILS NFR BLD: 65 % (ref 36–65)
NEUTS SEG NFR BLD: 3.67 K/UL (ref 1.5–8.1)
NRBC BLD-RTO: 0 PER 100 WBC
PLATELET # BLD AUTO: 129 K/UL (ref 138–453)
PMV BLD AUTO: 12.1 FL (ref 8.1–13.5)
POTASSIUM SERPL-SCNC: 4.4 MMOL/L (ref 3.7–5.3)
PROT SERPL-MCNC: 7.2 G/DL (ref 6.6–8.7)
RBC # BLD AUTO: 5.12 M/UL (ref 4.21–5.77)
SODIUM SERPL-SCNC: 140 MMOL/L (ref 136–145)
WBC OTHER # BLD: 5.7 K/UL (ref 3.5–11.3)

## 2024-04-08 PROCEDURE — G8417 CALC BMI ABV UP PARAM F/U: HCPCS | Performed by: NURSE PRACTITIONER

## 2024-04-08 PROCEDURE — 3074F SYST BP LT 130 MM HG: CPT | Performed by: NURSE PRACTITIONER

## 2024-04-08 PROCEDURE — 1123F ACP DISCUSS/DSCN MKR DOCD: CPT | Performed by: NURSE PRACTITIONER

## 2024-04-08 PROCEDURE — 3078F DIAST BP <80 MM HG: CPT | Performed by: NURSE PRACTITIONER

## 2024-04-08 PROCEDURE — G8427 DOCREV CUR MEDS BY ELIG CLIN: HCPCS | Performed by: NURSE PRACTITIONER

## 2024-04-08 PROCEDURE — 1036F TOBACCO NON-USER: CPT | Performed by: NURSE PRACTITIONER

## 2024-04-08 PROCEDURE — 99214 OFFICE O/P EST MOD 30 MIN: CPT | Performed by: NURSE PRACTITIONER

## 2024-04-08 RX ORDER — LISINOPRIL AND HYDROCHLOROTHIAZIDE 25; 20 MG/1; MG/1
1 TABLET ORAL DAILY
Qty: 90 EACH | Refills: 1 | Status: SHIPPED | OUTPATIENT
Start: 2024-04-08

## 2024-04-08 ASSESSMENT — PATIENT HEALTH QUESTIONNAIRE - PHQ9
8. MOVING OR SPEAKING SO SLOWLY THAT OTHER PEOPLE COULD HAVE NOTICED. OR THE OPPOSITE, BEING SO FIGETY OR RESTLESS THAT YOU HAVE BEEN MOVING AROUND A LOT MORE THAN USUAL: NOT AT ALL
3. TROUBLE FALLING OR STAYING ASLEEP: NEARLY EVERY DAY
2. FEELING DOWN, DEPRESSED OR HOPELESS: NEARLY EVERY DAY
6. FEELING BAD ABOUT YOURSELF - OR THAT YOU ARE A FAILURE OR HAVE LET YOURSELF OR YOUR FAMILY DOWN: MORE THAN HALF THE DAYS
5. POOR APPETITE OR OVEREATING: NOT AT ALL
9. THOUGHTS THAT YOU WOULD BE BETTER OFF DEAD, OR OF HURTING YOURSELF: NOT AT ALL
SUM OF ALL RESPONSES TO PHQ QUESTIONS 1-9: 8
10. IF YOU CHECKED OFF ANY PROBLEMS, HOW DIFFICULT HAVE THESE PROBLEMS MADE IT FOR YOU TO DO YOUR WORK, TAKE CARE OF THINGS AT HOME, OR GET ALONG WITH OTHER PEOPLE: NOT DIFFICULT AT ALL
SUM OF ALL RESPONSES TO PHQ QUESTIONS 1-9: 8
SUM OF ALL RESPONSES TO PHQ9 QUESTIONS 1 & 2: 3
1. LITTLE INTEREST OR PLEASURE IN DOING THINGS: NOT AT ALL
7. TROUBLE CONCENTRATING ON THINGS, SUCH AS READING THE NEWSPAPER OR WATCHING TELEVISION: NOT AT ALL

## 2024-04-08 NOTE — PROGRESS NOTES
No results found for this visit on 04/08/24.    Completed Orders/Prescriptions   Orders Placed This Encounter   Medications    lisinopril-hydroCHLOROthiazide (PRINZIDE;ZESTORETIC) 20-25 MG per tablet     Sig: Take 1 tablet by mouth daily     Dispense:  90 each     Refill:  1    sertraline (ZOLOFT) 50 MG tablet     Sig: Take 1 tablet by mouth daily     Dispense:  90 each     Refill:  2               AssessmentPlan/Medical Decision Making     1. Dizziness    - Vascular duplex carotid bilateral; Future  - MRI BRAIN W WO CONTRAST; Future    2. Balance problem    - Vascular duplex carotid bilateral; Future  - MRI BRAIN W WO CONTRAST; Future    3. Itchy skin    - CBC with Auto Differential; Future  - Comprehensive Metabolic Panel; Future    4. Essential hypertension    - lisinopril-hydroCHLOROthiazide (PRINZIDE;ZESTORETIC) 20-25 MG per tablet; Take 1 tablet by mouth daily  Dispense: 90 each; Refill: 1    5. Obstructive sleep apnea syndrome    - Sleep Study with PAP Titration; Future    6. Current severe episode of major depressive disorder without psychotic features without prior episode (HCC)    - Will restart Zoloft  - Referred to counseling- info given  - sertraline (ZOLOFT) 50 MG tablet; Take 1 tablet by mouth daily  Dispense: 90 each; Refill: 2      Return for as scheduled.    1.  Vincent received counseling on the following healthy behaviors: medication adherence  2.  Patient given educational materials - see patient instructions  3.  Was a self-tracking handout given in paper form or via [employer]? No  If yes, see orders or list here.  4.  Discussed use, benefit, and side effects of prescribed medications.  Barriers to medication compliance addressed.  All patient questions answered.  Pt voiced understanding.   5.  Reviewed prior labs, imaging, consultation, follow up, and health maintenance  6.  Continue current medications, diet and exercise.  7. Discussed use, benefit, and side effects of prescribed

## 2024-04-10 DIAGNOSIS — D69.6 THROMBOCYTOPENIA (HCC): Primary | ICD-10-CM

## 2024-04-22 ENCOUNTER — OFFICE VISIT (OUTPATIENT)
Dept: FAMILY MEDICINE CLINIC | Age: 79
End: 2024-04-22
Payer: MEDICARE

## 2024-04-22 ENCOUNTER — HOSPITAL ENCOUNTER (OUTPATIENT)
Dept: VASCULAR LAB | Age: 79
Discharge: HOME | End: 2024-04-24
Payer: MEDICARE

## 2024-04-22 ENCOUNTER — HOSPITAL ENCOUNTER (OUTPATIENT)
Dept: MRI IMAGING | Age: 79
Discharge: HOME | End: 2024-04-24
Payer: MEDICARE

## 2024-04-22 VITALS
OXYGEN SATURATION: 95 % | BODY MASS INDEX: 27.97 KG/M2 | WEIGHT: 223.8 LBS | HEART RATE: 87 BPM | SYSTOLIC BLOOD PRESSURE: 90 MMHG | DIASTOLIC BLOOD PRESSURE: 54 MMHG | TEMPERATURE: 97 F

## 2024-04-22 DIAGNOSIS — F32.2 CURRENT SEVERE EPISODE OF MAJOR DEPRESSIVE DISORDER WITHOUT PSYCHOTIC FEATURES WITHOUT PRIOR EPISODE (HCC): ICD-10-CM

## 2024-04-22 DIAGNOSIS — R26.89 BALANCE PROBLEM: ICD-10-CM

## 2024-04-22 DIAGNOSIS — R42 DIZZINESS: ICD-10-CM

## 2024-04-22 DIAGNOSIS — I10 ESSENTIAL HYPERTENSION: Chronic | ICD-10-CM

## 2024-04-22 DIAGNOSIS — Z09 FOLLOW-UP EXAM AFTER TREATMENT: Primary | ICD-10-CM

## 2024-04-22 LAB
VAS LEFT BULB EDV: 20.6 CM/S
VAS LEFT BULB PSV: 68.6 CM/S
VAS LEFT CCA DIST EDV: 16 CM/S
VAS LEFT CCA DIST PSV: 87.1 CM/S
VAS LEFT CCA MID EDV: 19.2 CM/S
VAS LEFT CCA MID PSV: 93.5 CM/S
VAS LEFT CCA PROX EDV: 17.6 CM/S
VAS LEFT CCA PROX PSV: 112.9 CM/S
VAS LEFT ECA EDV: 14.4 CM/S
VAS LEFT ECA PSV: 114.5 CM/S
VAS LEFT ICA DIST EDV: 33.7 CM/S
VAS LEFT ICA DIST PSV: 90.9 CM/S
VAS LEFT ICA MID EDV: 24.9 CM/S
VAS LEFT ICA MID PSV: 87.6 CM/S
VAS LEFT ICA PROX EDV: 17.5 CM/S
VAS LEFT ICA PROX PSV: 54.9 CM/S
VAS LEFT ICA/CCA PSV: 0.97 NO UNITS
VAS LEFT VERTEBRAL EDV: 14 CM/S
VAS LEFT VERTEBRAL PSV: 48.2 CM/S
VAS RIGHT BULB EDV: 11.3 CM/S
VAS RIGHT BULB PSV: 43.5 CM/S
VAS RIGHT CCA DIST EDV: 16.7 CM/S
VAS RIGHT CCA DIST PSV: 76.3 CM/S
VAS RIGHT CCA MID EDV: 20.6 CM/S
VAS RIGHT CCA MID PSV: 89.3 CM/S
VAS RIGHT CCA PROX EDV: 15.4 CM/S
VAS RIGHT CCA PROX PSV: 102.3 CM/S
VAS RIGHT ECA EDV: 7.6 CM/S
VAS RIGHT ECA PSV: 102.3 CM/S
VAS RIGHT ICA DIST EDV: 22.7 CM/S
VAS RIGHT ICA DIST PSV: 83.2 CM/S
VAS RIGHT ICA MID EDV: 24.1 CM/S
VAS RIGHT ICA MID PSV: 83.5 CM/S
VAS RIGHT ICA PROX EDV: 15.3 CM/S
VAS RIGHT ICA PROX PSV: 65.9 CM/S
VAS RIGHT ICA/CCA PSV: 0.9 NO UNITS
VAS RIGHT VERTEBRAL EDV: 21.6 CM/S
VAS RIGHT VERTEBRAL PSV: 65.6 CM/S

## 2024-04-22 PROCEDURE — 3078F DIAST BP <80 MM HG: CPT | Performed by: NURSE PRACTITIONER

## 2024-04-22 PROCEDURE — 99214 OFFICE O/P EST MOD 30 MIN: CPT | Performed by: NURSE PRACTITIONER

## 2024-04-22 PROCEDURE — G8417 CALC BMI ABV UP PARAM F/U: HCPCS | Performed by: NURSE PRACTITIONER

## 2024-04-22 PROCEDURE — A9579 GAD-BASE MR CONTRAST NOS,1ML: HCPCS | Performed by: NURSE PRACTITIONER

## 2024-04-22 PROCEDURE — 93880 EXTRACRANIAL BILAT STUDY: CPT | Performed by: SURGERY

## 2024-04-22 PROCEDURE — 70553 MRI BRAIN STEM W/O & W/DYE: CPT

## 2024-04-22 PROCEDURE — 1036F TOBACCO NON-USER: CPT | Performed by: NURSE PRACTITIONER

## 2024-04-22 PROCEDURE — 3074F SYST BP LT 130 MM HG: CPT | Performed by: NURSE PRACTITIONER

## 2024-04-22 PROCEDURE — 2580000003 HC RX 258: Performed by: NURSE PRACTITIONER

## 2024-04-22 PROCEDURE — 1123F ACP DISCUSS/DSCN MKR DOCD: CPT | Performed by: NURSE PRACTITIONER

## 2024-04-22 PROCEDURE — G8427 DOCREV CUR MEDS BY ELIG CLIN: HCPCS | Performed by: NURSE PRACTITIONER

## 2024-04-22 PROCEDURE — 6360000004 HC RX CONTRAST MEDICATION: Performed by: NURSE PRACTITIONER

## 2024-04-22 PROCEDURE — 93880 EXTRACRANIAL BILAT STUDY: CPT

## 2024-04-22 RX ORDER — SODIUM CHLORIDE 0.9 % (FLUSH) 0.9 %
10 SYRINGE (ML) INJECTION PRN
Status: DISCONTINUED | OUTPATIENT
Start: 2024-04-22 | End: 2024-04-25 | Stop reason: HOSPADM

## 2024-04-22 RX ADMIN — GADOTERIDOL 20 ML: 279.3 INJECTION, SOLUTION INTRAVENOUS at 15:55

## 2024-04-22 RX ADMIN — SODIUM CHLORIDE, PRESERVATIVE FREE 10 ML: 5 INJECTION INTRAVENOUS at 15:56

## 2024-04-22 ASSESSMENT — PATIENT HEALTH QUESTIONNAIRE - PHQ9
3. TROUBLE FALLING OR STAYING ASLEEP: NOT AT ALL
SUM OF ALL RESPONSES TO PHQ QUESTIONS 1-9: 0
SUM OF ALL RESPONSES TO PHQ QUESTIONS 1-9: 0
7. TROUBLE CONCENTRATING ON THINGS, SUCH AS READING THE NEWSPAPER OR WATCHING TELEVISION: NOT AT ALL
2. FEELING DOWN, DEPRESSED OR HOPELESS: NOT AT ALL
10. IF YOU CHECKED OFF ANY PROBLEMS, HOW DIFFICULT HAVE THESE PROBLEMS MADE IT FOR YOU TO DO YOUR WORK, TAKE CARE OF THINGS AT HOME, OR GET ALONG WITH OTHER PEOPLE: NOT DIFFICULT AT ALL
SUM OF ALL RESPONSES TO PHQ QUESTIONS 1-9: 0
4. FEELING TIRED OR HAVING LITTLE ENERGY: NOT AT ALL
8. MOVING OR SPEAKING SO SLOWLY THAT OTHER PEOPLE COULD HAVE NOTICED. OR THE OPPOSITE, BEING SO FIGETY OR RESTLESS THAT YOU HAVE BEEN MOVING AROUND A LOT MORE THAN USUAL: NOT AT ALL
5. POOR APPETITE OR OVEREATING: NOT AT ALL
1. LITTLE INTEREST OR PLEASURE IN DOING THINGS: NOT AT ALL
SUM OF ALL RESPONSES TO PHQ QUESTIONS 1-9: 0
9. THOUGHTS THAT YOU WOULD BE BETTER OFF DEAD, OR OF HURTING YOURSELF: NOT AT ALL
SUM OF ALL RESPONSES TO PHQ9 QUESTIONS 1 & 2: 0
6. FEELING BAD ABOUT YOURSELF - OR THAT YOU ARE A FAILURE OR HAVE LET YOURSELF OR YOUR FAMILY DOWN: NOT AT ALL

## 2024-04-22 NOTE — PROGRESS NOTES
Ella Jerome, [facility]  [address]  [place]  [address]  [phone]  [phone]    Vincent Carrasco is a 78 y.o. male who is here with c/o of:    Chief Complaint: Hypertension (No new concerns per pt)      Patient Accompanied by: n/a    HPI - Vincent Carrasco is here today with c/o:    Patient here for follow up depression, htn.    Reports that he has been compliant with his medications but says he has been taking two blood pressure medications instead of 1. Says he has been not feeling well, more sluggish.    Reports he feels his mood has improved. He was unable to get into counseling as they were not accepting new patients.    PHQ-9 Total Score: 0 (4/22/2024 12:57 PM)  Thoughts that you would be better off dead, or of hurting yourself in some way: 0 (4/22/2024 12:57 PM)         Health Maintenance Due   Topic Date Due    COVID-19 Vaccine (4 - 2023-24 season) 09/01/2023        Patient Active Problem List:     Erectile dysfunction     Sleep apnea     Osteoarthritis     Hernia     BPH (benign prostatic hyperplasia)     Essential hypertension     Tubular adenoma     CKD (chronic kidney disease)     Current severe episode of major depressive disorder without psychotic features without prior episode (HCC)     Past Medical History:   Diagnosis Date    Diverticulosis     DJD (degenerative joint disease)     Erectile dysfunction     Hemorrhoids     Hernia     Hypertension     Osteoarthritis     Sleep apnea     Tubular adenoma of colon       Past Surgical History:   Procedure Laterality Date    ABDOMINAL EXPLORATION SURGERY  07/24/2022    EXPLORATORY LAPAROTOMY LYSIS OF ADHESIONS UMBILICAL HERNIA REPAIR WITHOUT MESH by Dr. Orantes    COLONOSCOPY  2013; due 2018    COLONOSCOPY N/A 07/07/2022    COLONOSCOPY DIAGNOSTIC performed by Amado Calle MD at [facility] OR    HERNIA REPAIR      rt. inguinal with mesh    JOINT REPLACEMENT  right knee    LAPAROTOMY N/A 7/24/2022    EXPLORATORY

## 2024-04-25 ENCOUNTER — TELEPHONE (OUTPATIENT)
Dept: ONCOLOGY | Age: 79
End: 2024-04-25

## 2024-04-25 ENCOUNTER — INITIAL CONSULT (OUTPATIENT)
Dept: ONCOLOGY | Age: 79
End: 2024-04-25
Payer: MEDICARE

## 2024-04-25 VITALS
BODY MASS INDEX: 27.78 KG/M2 | HEIGHT: 75 IN | HEART RATE: 69 BPM | DIASTOLIC BLOOD PRESSURE: 66 MMHG | SYSTOLIC BLOOD PRESSURE: 117 MMHG | WEIGHT: 223.4 LBS | RESPIRATION RATE: 16 BRPM | TEMPERATURE: 97 F

## 2024-04-25 DIAGNOSIS — F10.10 ALCOHOL ABUSE: ICD-10-CM

## 2024-04-25 DIAGNOSIS — D69.6 THROMBOCYTOPENIA (HCC): Primary | ICD-10-CM

## 2024-04-25 PROCEDURE — 99202 OFFICE O/P NEW SF 15 MIN: CPT

## 2024-04-25 NOTE — PROGRESS NOTES
_           Mr. Vincent Carrasco is a very pleasant 78 y.o. male with history of multiple co morbidities as listed.  The patient is referred for evaluation and further management of thrombocytopenia.  The patient had lab test which showed persistent mild thrombocytopenia.  He denies any problems with active bleeding.  No bruising.  No nosebleed or gum bleed.  No GI bleeding.  No fever.  No weight loss or decreased appetite.  No enlarged lymph nodes.  Patient is non-smoker.  Chronic alcohol use.  No past history of liver disease or other serious chronic problems..       PAST MEDICAL HISTORY: has a past medical history of Diverticulosis, DJD (degenerative joint disease), Erectile dysfunction, Hemorrhoids, Hernia, Hypertension, Osteoarthritis, Sleep apnea, and Tubular adenoma of colon.    PAST SURGICAL HISTORY: has a past surgical history that includes hernia repair; joint replacement (right knee); Colonoscopy (2013; due 2018); Colonoscopy (N/A, 07/07/2022); Abdominal exploration surgery (07/24/2022); and laparotomy (N/A, 7/24/2022).     CURRENT MEDICATIONS:  has a current medication list which includes the following prescription(s): lisinopril-hydrochlorothiazide, sertraline, ibuprofen, therapeutic multivitamin-minerals, cpap machine, and respiratory therapy supplies.    ALLERGIES:  has No Known Allergies.    FAMILY HISTORY: Negative for any hematological or oncological conditions.     SOCIAL HISTORY:  reports that he has never smoked. He has never used smokeless tobacco. He reports current alcohol use. He reports that he does not use drugs.    REVIEW OF SYSTEMS:     General: Positive for weakness and fatigue. No unanticipated weight loss or decreased appetite. No fever or chills.   Eyes: No blurred vision, eye pain or double vision.   Ears: No hearing problems or drainage. No tinnitus.   Throat: No sore throat, problems with

## 2024-07-22 ENCOUNTER — OFFICE VISIT (OUTPATIENT)
Dept: PRIMARY CARE CLINIC | Age: 79
End: 2024-07-22
Payer: MEDICARE

## 2024-07-22 VITALS
DIASTOLIC BLOOD PRESSURE: 75 MMHG | TEMPERATURE: 97 F | OXYGEN SATURATION: 96 % | SYSTOLIC BLOOD PRESSURE: 137 MMHG | HEART RATE: 52 BPM

## 2024-07-22 DIAGNOSIS — L25.5 PLANT DERMATITIS: Primary | ICD-10-CM

## 2024-07-22 PROCEDURE — 1123F ACP DISCUSS/DSCN MKR DOCD: CPT

## 2024-07-22 PROCEDURE — G8427 DOCREV CUR MEDS BY ELIG CLIN: HCPCS

## 2024-07-22 PROCEDURE — 3075F SYST BP GE 130 - 139MM HG: CPT

## 2024-07-22 PROCEDURE — 96372 THER/PROPH/DIAG INJ SC/IM: CPT

## 2024-07-22 PROCEDURE — G8417 CALC BMI ABV UP PARAM F/U: HCPCS

## 2024-07-22 PROCEDURE — 99213 OFFICE O/P EST LOW 20 MIN: CPT

## 2024-07-22 PROCEDURE — 1036F TOBACCO NON-USER: CPT

## 2024-07-22 PROCEDURE — 3078F DIAST BP <80 MM HG: CPT

## 2024-07-22 RX ORDER — CLOBETASOL PROPIONATE 0.5 MG/G
CREAM TOPICAL
Qty: 90 G | Refills: 0 | Status: SHIPPED | OUTPATIENT
Start: 2024-07-22

## 2024-07-22 RX ORDER — TRIAMCINOLONE ACETONIDE 40 MG/ML
40 INJECTION, SUSPENSION INTRA-ARTICULAR; INTRAMUSCULAR ONCE
Status: COMPLETED | OUTPATIENT
Start: 2024-07-22 | End: 2024-07-22

## 2024-07-22 RX ORDER — PREDNISONE 20 MG/1
40 TABLET ORAL DAILY
Qty: 10 TABLET | Refills: 0 | Status: SHIPPED | OUTPATIENT
Start: 2024-07-22 | End: 2024-07-27

## 2024-07-22 RX ADMIN — TRIAMCINOLONE ACETONIDE 40 MG: 40 INJECTION, SUSPENSION INTRA-ARTICULAR; INTRAMUSCULAR at 13:28

## 2024-07-22 ASSESSMENT — ENCOUNTER SYMPTOMS
APNEA: 0
EYE DISCHARGE: 0
ANAL BLEEDING: 0
CHEST TIGHTNESS: 0
BLOOD IN STOOL: 0
BACK PAIN: 0
ABDOMINAL PAIN: 0
CHOKING: 0
SORE THROAT: 0
VOICE CHANGE: 0
EYES NEGATIVE: 1
EYE ITCHING: 0
VOMITING: 0
EYE PAIN: 0
RHINORRHEA: 0
RESPIRATORY NEGATIVE: 1
GASTROINTESTINAL NEGATIVE: 1
SHORTNESS OF BREATH: 0
SINUS PRESSURE: 0
RECTAL PAIN: 0
ABDOMINAL DISTENTION: 0
COUGH: 0
PHOTOPHOBIA: 0
STRIDOR: 0
NAIL CHANGES: 0
SINUS PAIN: 0
DIARRHEA: 0
COLOR CHANGE: 0
EYE REDNESS: 0
WHEEZING: 0
CONSTIPATION: 0
NAUSEA: 0
FACIAL SWELLING: 0
TROUBLE SWALLOWING: 0

## 2024-07-22 NOTE — PROGRESS NOTES
ibuprofen (ADVIL;MOTRIN) 600 MG tablet  (Patient not taking: Reported on 7/22/2024)      Respiratory Therapy Supplies [facility] CPAP headgear, ; to be used nightly.  Change every 6 months.  DX: G47.33 1 each 1     No current facility-administered medications for this visit.       No Known Allergies    Review of Systems:     Review of Systems   Constitutional: Negative.  Negative for activity change, appetite change, chills, diaphoresis, fatigue, fever and unexpected weight change.   HENT: Negative.  Negative for congestion, dental problem, drooling, ear discharge, ear pain, facial swelling, hearing loss, mouth sores, nosebleeds, postnasal drip, rhinorrhea, sinus pressure, sinus pain, sneezing, sore throat, tinnitus, trouble swallowing and voice change.    Eyes: Negative.  Negative for photophobia, pain, discharge, redness, itching and visual disturbance.   Respiratory: Negative.  Negative for apnea, cough, choking, chest tightness, shortness of breath, wheezing and stridor.    Cardiovascular: Negative.  Negative for chest pain, palpitations and leg swelling.   Gastrointestinal: Negative.  Negative for abdominal distention, abdominal pain, anal bleeding, anorexia, blood in stool, constipation, diarrhea, nausea, rectal pain and vomiting.   Musculoskeletal: Negative.  Negative for arthralgias, back pain, gait problem, joint pain, joint swelling, myalgias, neck pain and neck stiffness.   Skin:  Positive for rash. Negative for color change, nail changes, pallor and wound.   Neurological: Negative.  Negative for dizziness, tremors, seizures, syncope, facial asymmetry, speech difficulty, weakness, light-headedness, numbness and headaches.       Physical Exam:      /75   Pulse 52   Temp 97 °F (36.1 °C) (Tympanic)   SpO2 96%     Physical Exam  Vitals reviewed.   Constitutional:       Appearance: Normal appearance.   Cardiovascular:      Rate and Rhythm: Normal rate and regular rhythm.      Pulses: Normal pulses.

## 2024-08-06 DIAGNOSIS — F32.2 CURRENT SEVERE EPISODE OF MAJOR DEPRESSIVE DISORDER WITHOUT PSYCHOTIC FEATURES WITHOUT PRIOR EPISODE (HCC): ICD-10-CM

## 2024-08-06 DIAGNOSIS — I10 ESSENTIAL HYPERTENSION: Chronic | ICD-10-CM

## 2024-08-06 RX ORDER — LISINOPRIL AND HYDROCHLOROTHIAZIDE 25; 20 MG/1; MG/1
1 TABLET ORAL DAILY
Qty: 90 TABLET | Refills: 1 | Status: SHIPPED | OUTPATIENT
Start: 2024-08-06

## 2024-08-06 NOTE — TELEPHONE ENCOUNTER
Vincent Carrasco is calling to request a refill on the following medication(s):    Last Visit Date (If Applicable):  4/22/2024    Next Visit Date:    10/30/2024    Medication Request:  Requested Prescriptions     Pending Prescriptions Disp Refills    lisinopril-hydroCHLOROthiazide (PRINZIDE;ZESTORETIC) 20-25 MG per tablet 90 tablet 1     Sig: Take 1 tablet by mouth daily    sertraline (ZOLOFT) 50 MG tablet 90 tablet 2     Sig: Take 1 tablet by mouth daily

## 2024-08-20 ENCOUNTER — OFFICE VISIT (OUTPATIENT)
Dept: FAMILY MEDICINE CLINIC | Age: 79
End: 2024-08-20
Payer: MEDICARE

## 2024-08-20 VITALS
WEIGHT: 235 LBS | DIASTOLIC BLOOD PRESSURE: 70 MMHG | HEART RATE: 74 BPM | OXYGEN SATURATION: 97 % | TEMPERATURE: 97.5 F | BODY MASS INDEX: 29.37 KG/M2 | SYSTOLIC BLOOD PRESSURE: 122 MMHG

## 2024-08-20 DIAGNOSIS — M25.551 ACUTE HIP PAIN, RIGHT: Primary | ICD-10-CM

## 2024-08-20 PROCEDURE — 99213 OFFICE O/P EST LOW 20 MIN: CPT | Performed by: NURSE PRACTITIONER

## 2024-08-20 PROCEDURE — 3078F DIAST BP <80 MM HG: CPT | Performed by: NURSE PRACTITIONER

## 2024-08-20 PROCEDURE — 1036F TOBACCO NON-USER: CPT | Performed by: NURSE PRACTITIONER

## 2024-08-20 PROCEDURE — 3074F SYST BP LT 130 MM HG: CPT | Performed by: NURSE PRACTITIONER

## 2024-08-20 PROCEDURE — 1123F ACP DISCUSS/DSCN MKR DOCD: CPT | Performed by: NURSE PRACTITIONER

## 2024-08-20 PROCEDURE — G8427 DOCREV CUR MEDS BY ELIG CLIN: HCPCS | Performed by: NURSE PRACTITIONER

## 2024-08-20 PROCEDURE — G8417 CALC BMI ABV UP PARAM F/U: HCPCS | Performed by: NURSE PRACTITIONER

## 2024-08-20 ASSESSMENT — PATIENT HEALTH QUESTIONNAIRE - PHQ9
6. FEELING BAD ABOUT YOURSELF - OR THAT YOU ARE A FAILURE OR HAVE LET YOURSELF OR YOUR FAMILY DOWN: NOT AT ALL
SUM OF ALL RESPONSES TO PHQ9 QUESTIONS 1 & 2: 0
1. LITTLE INTEREST OR PLEASURE IN DOING THINGS: NOT AT ALL
3. TROUBLE FALLING OR STAYING ASLEEP: NOT AT ALL
2. FEELING DOWN, DEPRESSED OR HOPELESS: NOT AT ALL
9. THOUGHTS THAT YOU WOULD BE BETTER OFF DEAD, OR OF HURTING YOURSELF: NOT AT ALL
8. MOVING OR SPEAKING SO SLOWLY THAT OTHER PEOPLE COULD HAVE NOTICED. OR THE OPPOSITE, BEING SO FIGETY OR RESTLESS THAT YOU HAVE BEEN MOVING AROUND A LOT MORE THAN USUAL: NOT AT ALL
10. IF YOU CHECKED OFF ANY PROBLEMS, HOW DIFFICULT HAVE THESE PROBLEMS MADE IT FOR YOU TO DO YOUR WORK, TAKE CARE OF THINGS AT HOME, OR GET ALONG WITH OTHER PEOPLE: NOT DIFFICULT AT ALL
SUM OF ALL RESPONSES TO PHQ QUESTIONS 1-9: 0
SUM OF ALL RESPONSES TO PHQ QUESTIONS 1-9: 0
5. POOR APPETITE OR OVEREATING: NOT AT ALL
7. TROUBLE CONCENTRATING ON THINGS, SUCH AS READING THE NEWSPAPER OR WATCHING TELEVISION: NOT AT ALL
4. FEELING TIRED OR HAVING LITTLE ENERGY: NOT AT ALL
SUM OF ALL RESPONSES TO PHQ QUESTIONS 1-9: 0
SUM OF ALL RESPONSES TO PHQ QUESTIONS 1-9: 0

## 2024-08-20 NOTE — PROGRESS NOTES
cm/s Final    Right CCA prox PSV 04/22/2024 102.3  cm/s Final    Right CCA prox EDV 04/22/2024 15.4  cm/s Final    Right bulb PSV 04/22/2024 43.5  cm/s Final    Right bulb EDV 04/22/2024 11.3  cm/s Final    Right ECA PSV 04/22/2024 102.3  cm/s Final    Right ECA EDV 04/22/2024 7.6  cm/s Final    Right ICA dist PSV 04/22/2024 83.2  cm/s Final    Right ICA dist EDV 04/22/2024 22.7  cm/s Final    Right ICA mid PSV 04/22/2024 83.5  cm/s Final    Right ICA mid EDV 04/22/2024 24.1  cm/s Final    Right ICA prox PSV 04/22/2024 65.9  cm/s Final    Right ICA prox EDV 04/22/2024 15.3  cm/s Final    Right vertebral PSV 04/22/2024 65.6  cm/s Final    Right vertebral EDV 04/22/2024 21.6  cm/s Final    Right ICA/CCA PSV 04/22/2024 0.9  no units Final    Left CCA dist PSV 04/22/2024 87.1  cm/s Final    Left CCA dist EDV 04/22/2024 16.0  cm/s Final    Left CCA mid PSV 04/22/2024 93.5  cm/s Final    Left CCA mid EDV 04/22/2024 19.2  cm/s Final    Left CCA prox PSV 04/22/2024 112.9  cm/s Final    Left CCA prox EDV 04/22/2024 17.6  cm/s Final    Left bulb PSV 04/22/2024 68.6  cm/s Final    Left bulb EDV 04/22/2024 20.6  cm/s Final    Left ECA PSV 04/22/2024 114.5  cm/s Final    Left ECA EDV 04/22/2024 14.4  cm/s Final    Left ICA dist PSV 04/22/2024 90.9  cm/s Final    Left ICA dist EDV 04/22/2024 33.7  cm/s Final    Left ICA mid PSV 04/22/2024 87.6  cm/s Final    Left ICA mid EDV 04/22/2024 24.9  cm/s Final    Left ICA prox PSV 04/22/2024 54.9  cm/s Final    Left ICA prox EDV 04/22/2024 17.5  cm/s Final    Left vertebral PSV 04/22/2024 48.2  cm/s Final    Left vertebral EDV 04/22/2024 14.0  cm/s Final    Left ICA/CCA PSV 04/22/2024 0.97  no units Final     No results found for this visit on 08/20/24.    Completed Orders/Prescriptions   No orders of the defined types were placed in this encounter.              AssessmentPlan/Medical Decision Making     1. Acute hip pain, right    - Keep chiropractor appt  - PRN NSAID use  - XR HIP

## 2024-08-22 DIAGNOSIS — M25.551 ACUTE HIP PAIN, RIGHT: ICD-10-CM

## 2024-08-22 DIAGNOSIS — R93.89 ABNORMAL X-RAY: Primary | ICD-10-CM

## 2024-10-30 ENCOUNTER — OFFICE VISIT (OUTPATIENT)
Dept: FAMILY MEDICINE CLINIC | Age: 79
End: 2024-10-30

## 2024-10-30 VITALS
TEMPERATURE: 97.5 F | OXYGEN SATURATION: 98 % | BODY MASS INDEX: 29.75 KG/M2 | HEART RATE: 68 BPM | SYSTOLIC BLOOD PRESSURE: 112 MMHG | WEIGHT: 238 LBS | DIASTOLIC BLOOD PRESSURE: 68 MMHG

## 2024-10-30 DIAGNOSIS — Z12.5 SCREENING PSA (PROSTATE SPECIFIC ANTIGEN): ICD-10-CM

## 2024-10-30 DIAGNOSIS — Z00.00 MEDICARE ANNUAL WELLNESS VISIT, SUBSEQUENT: Primary | ICD-10-CM

## 2024-10-30 DIAGNOSIS — H91.93 HEARING DIFFICULTY OF BOTH EARS: ICD-10-CM

## 2024-10-30 DIAGNOSIS — G47.33 OBSTRUCTIVE SLEEP APNEA SYNDROME: ICD-10-CM

## 2024-10-30 DIAGNOSIS — Z23 IMMUNIZATION DUE: ICD-10-CM

## 2024-10-30 DIAGNOSIS — I10 ESSENTIAL HYPERTENSION: ICD-10-CM

## 2024-10-30 RX ORDER — LISINOPRIL AND HYDROCHLOROTHIAZIDE 20; 25 MG/1; MG/1
1 TABLET ORAL DAILY
Qty: 90 TABLET | Refills: 1 | Status: SHIPPED | OUTPATIENT
Start: 2024-10-30

## 2024-10-30 ASSESSMENT — PATIENT HEALTH QUESTIONNAIRE - PHQ9
SUM OF ALL RESPONSES TO PHQ9 QUESTIONS 1 & 2: 3
SUM OF ALL RESPONSES TO PHQ QUESTIONS 1-9: 6
7. TROUBLE CONCENTRATING ON THINGS, SUCH AS READING THE NEWSPAPER OR WATCHING TELEVISION: NOT AT ALL
3. TROUBLE FALLING OR STAYING ASLEEP: NOT AT ALL
4. FEELING TIRED OR HAVING LITTLE ENERGY: NEARLY EVERY DAY
2. FEELING DOWN, DEPRESSED OR HOPELESS: SEVERAL DAYS
8. MOVING OR SPEAKING SO SLOWLY THAT OTHER PEOPLE COULD HAVE NOTICED. OR THE OPPOSITE, BEING SO FIGETY OR RESTLESS THAT YOU HAVE BEEN MOVING AROUND A LOT MORE THAN USUAL: NOT AT ALL
SUM OF ALL RESPONSES TO PHQ QUESTIONS 1-9: 6
10. IF YOU CHECKED OFF ANY PROBLEMS, HOW DIFFICULT HAVE THESE PROBLEMS MADE IT FOR YOU TO DO YOUR WORK, TAKE CARE OF THINGS AT HOME, OR GET ALONG WITH OTHER PEOPLE: NOT DIFFICULT AT ALL
9. THOUGHTS THAT YOU WOULD BE BETTER OFF DEAD, OR OF HURTING YOURSELF: NOT AT ALL
SUM OF ALL RESPONSES TO PHQ QUESTIONS 1-9: 6
5. POOR APPETITE OR OVEREATING: NOT AT ALL
1. LITTLE INTEREST OR PLEASURE IN DOING THINGS: MORE THAN HALF THE DAYS
SUM OF ALL RESPONSES TO PHQ QUESTIONS 1-9: 6
6. FEELING BAD ABOUT YOURSELF - OR THAT YOU ARE A FAILURE OR HAVE LET YOURSELF OR YOUR FAMILY DOWN: NOT AT ALL

## 2024-10-30 NOTE — PROGRESS NOTES
Medicare Annual Wellness Visit    Vincent Carrasco is here for Medicare AWV    Assessment & Plan   Medicare annual wellness visit, subsequent  Immunization due  -     Influenza, FLUAD Trivalent, (age 65 y+), IM, Preservative Free, 0.5mL  Essential hypertension  -     CBC with Auto Differential; Future  -     Comprehensive Metabolic Panel; Future  -     TSH with Reflex; Future  -     Lipid, Fasting; Future  -     lisinopril-hydroCHLOROthiazide (PRINZIDE;ZESTORETIC) 20-25 MG per tablet; Take 1 tablet by mouth daily, Disp-90 tablet, R-1Normal  Obstructive sleep apnea syndrome  -     Baseline Diagnostic Sleep Study; Future  Screening PSA (prostate specific antigen)  -     PSA Screening; Future  Hearing difficulty of both ears  -     [facility] - Jaylin Cortez, [place], Audiology, [place]    Recommendations for Preventive Services Due: see orders and patient instructions/AVS.  Recommended screening schedule for the next 5-10 years is provided to the patient in written form: see Patient Instructions/AVS.     Return for chronic conditions.     Subjective       Patient's complete Health Risk Assessment and screening values have been reviewed and are found in Flowsheets. The following problems were reviewed today and where indicated follow up appointments were made and/or referrals ordered.    Positive Risk Factor Screenings with Interventions:    Fall Risk:  Do you feel unsteady or are you worried about falling? : (!) yes  2 or more falls in past year?: no  Fall with injury in past year?: no     Interventions:    Reviewed medications, home hazards, visual acuity, and co-morbidities that can increase risk for falls     Depression:  PHQ-2 Score: 3  PHQ-9 Total Score: 6  Total Score Interpretation: 5-9 = mild depression  Interventions:  Patient comments: he says he feels lethargic            Inactivity:  On average, how many days per week do you engage in moderate to strenuous exercise (like a brisk walk)?: 1 day (!) Abnormal  On average,

## 2024-10-30 NOTE — PATIENT INSTRUCTIONS
device in the bathroom with you.   Where can you learn more?  Go to https://www.[employer].net/patientEd and enter G117 to learn more about \"Preventing Falls: Care Instructions.\"  Current as of: July 17, 2023  Content Version: 14.2  © 2024 [employer].   Care instructions adapted under license by [employer]. If you have questions about a medical condition or this instruction, always ask your healthcare professional. [employer] disclaims any warranty or liability for your use of this information.           A Healthy Heart: Care Instructions  Overview     Coronary artery disease, also called heart disease, occurs when a substance called plaque builds up in the vessels that supply oxygen-rich blood to your heart muscle. This can narrow the blood vessels and reduce blood flow. A heart attack happens when blood flow is completely blocked. A high-fat diet, smoking, and other factors increase the risk of heart disease.  Your doctor has found that you have a chance of having heart disease. A heart-healthy lifestyle can help keep your heart healthy and prevent heart disease. This lifestyle includes eating healthy, being active, staying at a weight that's healthy for you, and not smoking or using tobacco. It also includes taking medicines as directed, managing other health conditions, and trying to get a healthy amount of sleep.  Follow-up care is a key part of your treatment and safety. Be sure to make and go to all appointments, and call your doctor if you are having problems. It's also a good idea to know your test results and keep a list of the medicines you take.  How can you care for yourself at home?  Diet    Use less salt when you cook and eat. This helps lower your blood pressure. Taste food before salting. Add only a little salt when you think you need it. With time, your taste buds will adjust to less salt.     Eat fewer snack items, fast foods, canned soups, and other high-salt,

## 2024-11-15 ENCOUNTER — HOSPITAL ENCOUNTER (OUTPATIENT)
Age: 79
Setting detail: SPECIMEN
Discharge: HOME | End: 2024-11-15

## 2024-11-15 DIAGNOSIS — Z12.5 SCREENING PSA (PROSTATE SPECIFIC ANTIGEN): ICD-10-CM

## 2024-11-15 DIAGNOSIS — I10 ESSENTIAL HYPERTENSION: ICD-10-CM

## 2024-11-15 LAB
ALBUMIN SERPL-MCNC: 4 G/DL (ref 3.5–5.2)
ALBUMIN/GLOB SERPL: 1.4 {RATIO} (ref 1–2.5)
ALP SERPL-CCNC: 69 U/L (ref 40–129)
ALT SERPL-CCNC: 11 U/L (ref 10–50)
ANION GAP SERPL CALCULATED.3IONS-SCNC: 15 MMOL/L (ref 9–16)
AST SERPL-CCNC: 23 U/L (ref 10–50)
BASOPHILS # BLD: 0 K/UL (ref 0–0.2)
BASOPHILS NFR BLD: 0 % (ref 0–2)
BILIRUB SERPL-MCNC: 1.8 MG/DL (ref 0–1.2)
BUN SERPL-MCNC: 35 MG/DL (ref 8–23)
CALCIUM SERPL-MCNC: 8.8 MG/DL (ref 8.6–10.4)
CHLORIDE SERPL-SCNC: 97 MMOL/L (ref 98–107)
CHOLEST SERPL-MCNC: 149 MG/DL (ref 0–199)
CHOLESTEROL/HDL RATIO: 3.5
CO2 SERPL-SCNC: 24 MMOL/L (ref 20–31)
CREAT SERPL-MCNC: 1.8 MG/DL (ref 0.7–1.2)
EOSINOPHIL # BLD: 0 K/UL (ref 0–0.44)
EOSINOPHILS RELATIVE PERCENT: 0 % (ref 1–4)
ERYTHROCYTE [DISTWIDTH] IN BLOOD BY AUTOMATED COUNT: 13.5 % (ref 11.8–14.4)
GFR, ESTIMATED: 38 ML/MIN/1.73M2
GLUCOSE SERPL-MCNC: 144 MG/DL (ref 74–99)
HCT VFR BLD AUTO: 45.9 % (ref 40.7–50.3)
HDLC SERPL-MCNC: 43 MG/DL
HGB BLD-MCNC: 15.5 G/DL (ref 13–17)
IMM GRANULOCYTES # BLD AUTO: 0.14 K/UL (ref 0–0.3)
IMM GRANULOCYTES NFR BLD: 1 %
LDLC SERPL CALC-MCNC: 74 MG/DL (ref 0–100)
LYMPHOCYTES NFR BLD: 0.72 K/UL (ref 1.1–3.7)
LYMPHOCYTES RELATIVE PERCENT: 5 % (ref 24–43)
MCH RBC QN AUTO: 30.8 PG (ref 25.2–33.5)
MCHC RBC AUTO-ENTMCNC: 33.8 G/DL (ref 28.4–34.8)
MCV RBC AUTO: 91.3 FL (ref 82.6–102.9)
MONOCYTES NFR BLD: 0.72 K/UL (ref 0.1–1.2)
MONOCYTES NFR BLD: 5 % (ref 3–12)
MORPHOLOGY: NORMAL
NEUTROPHILS NFR BLD: 89 % (ref 36–65)
NEUTS SEG NFR BLD: 12.82 K/UL (ref 1.5–8.1)
NRBC BLD-RTO: 0 PER 100 WBC
PLATELET # BLD AUTO: 72 K/UL (ref 138–453)
PMV BLD AUTO: 12 FL (ref 8.1–13.5)
POTASSIUM SERPL-SCNC: 3.3 MMOL/L (ref 3.7–5.3)
PROT SERPL-MCNC: 6.8 G/DL (ref 6.6–8.7)
PSA SERPL-MCNC: 78.7 NG/ML (ref 0–4)
RBC # BLD AUTO: 5.03 M/UL (ref 4.21–5.77)
SODIUM SERPL-SCNC: 136 MMOL/L (ref 136–145)
TRIGL SERPL-MCNC: 160 MG/DL (ref 0–149)
TSH SERPL DL<=0.05 MIU/L-ACNC: 1.39 UIU/ML (ref 0.27–4.2)
VLDLC SERPL CALC-MCNC: 32 MG/DL (ref 1–30)
WBC OTHER # BLD: 14.4 K/UL (ref 3.5–11.3)

## 2024-11-26 ENCOUNTER — OFFICE VISIT (OUTPATIENT)
Dept: FAMILY MEDICINE CLINIC | Age: 79
End: 2024-11-26

## 2024-11-26 VITALS
BODY MASS INDEX: 28.62 KG/M2 | DIASTOLIC BLOOD PRESSURE: 70 MMHG | SYSTOLIC BLOOD PRESSURE: 104 MMHG | HEART RATE: 90 BPM | TEMPERATURE: 97.5 F | WEIGHT: 229 LBS | OXYGEN SATURATION: 97 %

## 2024-11-26 DIAGNOSIS — I10 ESSENTIAL HYPERTENSION: ICD-10-CM

## 2024-11-26 DIAGNOSIS — Z09 HOSPITAL DISCHARGE FOLLOW-UP: Primary | ICD-10-CM

## 2024-11-26 RX ORDER — TAMSULOSIN HYDROCHLORIDE 0.4 MG/1
0.4 CAPSULE ORAL NIGHTLY
COMMUNITY
Start: 2024-11-19

## 2024-11-26 RX ORDER — CIPROFLOXACIN 500 MG/1
500 TABLET, FILM COATED ORAL DAILY
COMMUNITY
Start: 2024-11-19 | End: 2024-12-20

## 2024-11-26 RX ORDER — MELOXICAM 15 MG/1
15 TABLET ORAL DAILY
COMMUNITY
Start: 2024-11-22

## 2024-11-26 ASSESSMENT — PATIENT HEALTH QUESTIONNAIRE - PHQ9
8. MOVING OR SPEAKING SO SLOWLY THAT OTHER PEOPLE COULD HAVE NOTICED. OR THE OPPOSITE, BEING SO FIGETY OR RESTLESS THAT YOU HAVE BEEN MOVING AROUND A LOT MORE THAN USUAL: NOT AT ALL
4. FEELING TIRED OR HAVING LITTLE ENERGY: NOT AT ALL
SUM OF ALL RESPONSES TO PHQ9 QUESTIONS 1 & 2: 0
10. IF YOU CHECKED OFF ANY PROBLEMS, HOW DIFFICULT HAVE THESE PROBLEMS MADE IT FOR YOU TO DO YOUR WORK, TAKE CARE OF THINGS AT HOME, OR GET ALONG WITH OTHER PEOPLE: NOT DIFFICULT AT ALL
6. FEELING BAD ABOUT YOURSELF - OR THAT YOU ARE A FAILURE OR HAVE LET YOURSELF OR YOUR FAMILY DOWN: NOT AT ALL
9. THOUGHTS THAT YOU WOULD BE BETTER OFF DEAD, OR OF HURTING YOURSELF: NOT AT ALL
SUM OF ALL RESPONSES TO PHQ QUESTIONS 1-9: 0
SUM OF ALL RESPONSES TO PHQ QUESTIONS 1-9: 0
5. POOR APPETITE OR OVEREATING: NOT AT ALL
1. LITTLE INTEREST OR PLEASURE IN DOING THINGS: NOT AT ALL
7. TROUBLE CONCENTRATING ON THINGS, SUCH AS READING THE NEWSPAPER OR WATCHING TELEVISION: NOT AT ALL
3. TROUBLE FALLING OR STAYING ASLEEP: NOT AT ALL
SUM OF ALL RESPONSES TO PHQ QUESTIONS 1-9: 0
SUM OF ALL RESPONSES TO PHQ QUESTIONS 1-9: 0
2. FEELING DOWN, DEPRESSED OR HOPELESS: NOT AT ALL

## 2024-11-26 NOTE — PROGRESS NOTES
Post-Discharge Transitional Care Follow Up    Vincent Carrasco   YOB: 1945    Date of Office Visit:  11/26/2024  Date of Hospital Admission: 11/15/2024  Date of Hospital Discharge: 11/19/2024    Care management risk score Rising risk (score 2-5) and Complex Care (Scores >=6): No Risk Score On File     Non face to face  following discharge, date last encounter closed (first attempt may have been earlier): *No documented post hospital discharge outreach found in the last 14 days     Call initiated 2 business days of discharge: *No response recorded in the last 14 days    ASSESSMENT/PLAN:     Assessment & Plan   Medical Decision Making: high complexity  Assessment & Plan  1. Urinary Tract Infection.  He was hospitalized with a urinary tract infection that led to sepsis, with E. coli detected in the blood. His PSA level was recorded at 78.7 during his hospital stay, a significant increase from 7.9 in 2023 and 9 in 2022. This elevation could be attributed to the infection. His kidney function has returned to normal, and he has lost 10 pounds since October 2023. He is advised to continue his follow-ups with Dr. Mann, who will repeat his blood work.  Advised to call Dr. Mann because patient wife said that his antibiotic treatment will be extended once he completes the 2 tablets daily which is Thursday. He is encouraged to maintain adequate hydration, preferably with water, and limit his intake of diet coke.  Per urology PSA will be checked in 3 months.    2. Hypertension.  He is advised to reduce his lisinopril dosage to 10 mg and monitor his blood pressure response. He can cut his current 20 mg tablets in half to achieve the 10 mg dose. A new prescription for lisinopril will be provided if needed. If his blood pressure does not improve, a different medication will be considered.    3. Depression.  He has a family history of depression and was previously on sertraline but discontinued it before his hospital

## 2024-12-09 ENCOUNTER — APPOINTMENT (OUTPATIENT)
Dept: GENERAL RADIOLOGY | Age: 79
End: 2024-12-09
Payer: MEDICARE

## 2024-12-09 ENCOUNTER — HOSPITAL ENCOUNTER (EMERGENCY)
Age: 79
Discharge: HOME | End: 2024-12-09
Attending: EMERGENCY MEDICINE
Payer: MEDICARE

## 2024-12-09 VITALS
BODY MASS INDEX: 27.98 KG/M2 | OXYGEN SATURATION: 98 % | HEIGHT: 75 IN | WEIGHT: 225 LBS | TEMPERATURE: 97.7 F | HEART RATE: 82 BPM | SYSTOLIC BLOOD PRESSURE: 135 MMHG | DIASTOLIC BLOOD PRESSURE: 82 MMHG | RESPIRATION RATE: 18 BRPM

## 2024-12-09 DIAGNOSIS — M25.512 ACUTE PAIN OF LEFT SHOULDER: Primary | ICD-10-CM

## 2024-12-09 PROCEDURE — 99283 EMERGENCY DEPT VISIT LOW MDM: CPT

## 2024-12-09 PROCEDURE — 73030 X-RAY EXAM OF SHOULDER: CPT

## 2024-12-09 RX ORDER — OXYCODONE AND ACETAMINOPHEN 5; 325 MG/1; MG/1
1 TABLET ORAL EVERY 6 HOURS PRN
Qty: 12 TABLET | Refills: 0 | Status: SHIPPED | OUTPATIENT
Start: 2024-12-09 | End: 2024-12-11

## 2024-12-09 ASSESSMENT — PAIN SCALES - GENERAL: PAINLEVEL_OUTOF10: 5

## 2024-12-09 ASSESSMENT — PAIN - FUNCTIONAL ASSESSMENT: PAIN_FUNCTIONAL_ASSESSMENT: 0-10

## 2024-12-09 NOTE — ED PROVIDER NOTES
headaches stroke like symptoms or syncope.  Skin:  Denies any recent new rash itching or change in skin color.  Psychiatric:  Denies any thoughts of suicide homicide.  Patient does not voice any problems with anxiety or depression    Except as noted above the remainder of the review of systems was reviewed and negative.     PHYSICAL EXAM    (up to 7 for level 4, 8 or more for level 5)     ED Triage Vitals [12/09/24 1051]   BP Systolic BP Percentile Diastolic BP Percentile Temp Temp src Pulse Respirations SpO2   135/82 -- -- 97.7 °F (36.5 °C) -- 82 18 98 %      Height Weight - Scale         1.905 m (6' 3\") 102.1 kg (225 lb)             Physical Exam  Vitals and nursing note reviewed.   Constitutional:       Appearance: Normal appearance. He is normal weight.   HENT:      Head: Normocephalic.      Nose: Nose normal.      Mouth/Throat:      Mouth: Mucous membranes are moist.      Pharynx: Oropharynx is clear.   Eyes:      Extraocular Movements: Extraocular movements intact.      Conjunctiva/sclera: Conjunctivae normal.      Pupils: Pupils are equal, round, and reactive to light.   Cardiovascular:      Rate and Rhythm: Normal rate and regular rhythm.      Pulses: Normal pulses.      Heart sounds: Normal heart sounds.   Pulmonary:      Effort: Pulmonary effort is normal.      Breath sounds: Normal breath sounds.   Abdominal:      General: Abdomen is flat. Bowel sounds are normal.   Musculoskeletal:      Left shoulder: Tenderness present. No swelling, deformity, effusion, laceration, bony tenderness or crepitus. Decreased range of motion. Normal strength. Normal pulse.      Cervical back: Normal range of motion and neck supple.      Comments: Very mild decrease in range of motion of the left shoulder.  Patient states deep pain not surface pain.   Skin:     General: Skin is warm.   Neurological:      General: No focal deficit present.      Mental Status: He is alert and oriented to person, place, and time. Mental status is

## 2024-12-09 NOTE — DISCHARGE INSTRUCTIONS
Take medication as directed. Follow up with your PCP as directed. Return to the ED if you have any other concerns.

## 2024-12-11 ENCOUNTER — OFFICE VISIT (OUTPATIENT)
Dept: FAMILY MEDICINE CLINIC | Age: 79
End: 2024-12-11
Payer: MEDICARE

## 2024-12-11 VITALS
DIASTOLIC BLOOD PRESSURE: 70 MMHG | TEMPERATURE: 97.2 F | OXYGEN SATURATION: 96 % | HEART RATE: 71 BPM | WEIGHT: 232 LBS | SYSTOLIC BLOOD PRESSURE: 130 MMHG | BODY MASS INDEX: 29 KG/M2

## 2024-12-11 DIAGNOSIS — S49.92XD INJURY OF LEFT SHOULDER, SUBSEQUENT ENCOUNTER: ICD-10-CM
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
## 2024-12-11 DIAGNOSIS — R35.0 URINARY FREQUENCY: Primary | ICD-10-CM

## 2024-12-11 LAB
BILIRUBIN, POC: NORMAL
BLOOD URINE, POC: NORMAL
CLARITY, POC: CLEAR
COLOR, POC: YELLOW
GLUCOSE URINE, POC: NORMAL MG/DL
KETONES, POC: NORMAL MG/DL
LEUKOCYTE EST, POC: NORMAL
NITRITE, POC: NORMAL
PH, POC: 7
PROTEIN, POC: NORMAL MG/DL
SPECIFIC GRAVITY, POC: 1.02
UROBILINOGEN, POC: 0.2 MG/DL

## 2024-12-11 PROCEDURE — G8482 FLU IMMUNIZE ORDER/ADMIN: HCPCS | Performed by: NURSE PRACTITIONER

## 2024-12-11 PROCEDURE — 3078F DIAST BP <80 MM HG: CPT | Performed by: NURSE PRACTITIONER

## 2024-12-11 PROCEDURE — G8417 CALC BMI ABV UP PARAM F/U: HCPCS | Performed by: NURSE PRACTITIONER

## 2024-12-11 PROCEDURE — G8427 DOCREV CUR MEDS BY ELIG CLIN: HCPCS | Performed by: NURSE PRACTITIONER

## 2024-12-11 PROCEDURE — 1123F ACP DISCUSS/DSCN MKR DOCD: CPT | Performed by: NURSE PRACTITIONER

## 2024-12-11 PROCEDURE — 81003 URINALYSIS AUTO W/O SCOPE: CPT | Performed by: NURSE PRACTITIONER

## 2024-12-11 PROCEDURE — 1160F RVW MEDS BY RX/DR IN RCRD: CPT | Performed by: NURSE PRACTITIONER

## 2024-12-11 PROCEDURE — 3075F SYST BP GE 130 - 139MM HG: CPT | Performed by: NURSE PRACTITIONER

## 2024-12-11 PROCEDURE — 99214 OFFICE O/P EST MOD 30 MIN: CPT | Performed by: NURSE PRACTITIONER

## 2024-12-11 PROCEDURE — 1036F TOBACCO NON-USER: CPT | Performed by: NURSE PRACTITIONER

## 2024-12-11 PROCEDURE — 1159F MED LIST DOCD IN RCRD: CPT | Performed by: NURSE PRACTITIONER

## 2024-12-11 SDOH — ECONOMIC STABILITY: FOOD INSECURITY: WITHIN THE PAST 12 MONTHS, YOU WORRIED THAT YOUR FOOD WOULD RUN OUT BEFORE YOU GOT MONEY TO BUY MORE.: NEVER TRUE

## 2024-12-11 SDOH — ECONOMIC STABILITY: INCOME INSECURITY: HOW HARD IS IT FOR YOU TO PAY FOR THE VERY BASICS LIKE FOOD, HOUSING, MEDICAL CARE, AND HEATING?: NOT HARD AT ALL

## 2024-12-11 SDOH — ECONOMIC STABILITY: FOOD INSECURITY: WITHIN THE PAST 12 MONTHS, THE FOOD YOU BOUGHT JUST DIDN'T LAST AND YOU DIDN'T HAVE MONEY TO GET MORE.: NEVER TRUE

## 2024-12-11 ASSESSMENT — PATIENT HEALTH QUESTIONNAIRE - PHQ9
7. TROUBLE CONCENTRATING ON THINGS, SUCH AS READING THE NEWSPAPER OR WATCHING TELEVISION: NOT AT ALL
SUM OF ALL RESPONSES TO PHQ QUESTIONS 1-9: 0
9. THOUGHTS THAT YOU WOULD BE BETTER OFF DEAD, OR OF HURTING YOURSELF: NOT AT ALL
1. LITTLE INTEREST OR PLEASURE IN DOING THINGS: NOT AT ALL
SUM OF ALL RESPONSES TO PHQ QUESTIONS 1-9: 0
SUM OF ALL RESPONSES TO PHQ QUESTIONS 1-9: 0
10. IF YOU CHECKED OFF ANY PROBLEMS, HOW DIFFICULT HAVE THESE PROBLEMS MADE IT FOR YOU TO DO YOUR WORK, TAKE CARE OF THINGS AT HOME, OR GET ALONG WITH OTHER PEOPLE: NOT DIFFICULT AT ALL
4. FEELING TIRED OR HAVING LITTLE ENERGY: NOT AT ALL
6. FEELING BAD ABOUT YOURSELF - OR THAT YOU ARE A FAILURE OR HAVE LET YOURSELF OR YOUR FAMILY DOWN: NOT AT ALL
SUM OF ALL RESPONSES TO PHQ9 QUESTIONS 1 & 2: 0
SUM OF ALL RESPONSES TO PHQ QUESTIONS 1-9: 0
8. MOVING OR SPEAKING SO SLOWLY THAT OTHER PEOPLE COULD HAVE NOTICED. OR THE OPPOSITE, BEING SO FIGETY OR RESTLESS THAT YOU HAVE BEEN MOVING AROUND A LOT MORE THAN USUAL: NOT AT ALL
3. TROUBLE FALLING OR STAYING ASLEEP: NOT AT ALL
2. FEELING DOWN, DEPRESSED OR HOPELESS: NOT AT ALL
5. POOR APPETITE OR OVEREATING: NOT AT ALL

## 2024-12-11 NOTE — PROGRESS NOTES
Erectile dysfunction     Hemorrhoids     Hernia     Hypertension     Osteoarthritis     Sleep apnea     Tubular adenoma of colon       Past Surgical History:   Procedure Laterality Date    ABDOMINAL EXPLORATION SURGERY  07/24/2022    EXPLORATORY LAPAROTOMY LYSIS OF ADHESIONS UMBILICAL HERNIA REPAIR WITHOUT MESH by Dr. Orantes    COLONOSCOPY  2013; due 2018    COLONOSCOPY N/A 07/07/2022    COLONOSCOPY DIAGNOSTIC performed by Amado Calle MD at [facility] OR    HERNIA REPAIR      rt. inguinal with mesh    JOINT REPLACEMENT  right knee    LAPAROTOMY N/A 7/24/2022    EXPLORATORY LAPAROTOMY LYSIS OF ADHESIONS UMBILICAL HERNIA REPAIR WITHOUT MESH performed by Luis Orantes DO at [facility] OR     Family History   Problem Relation Age of Onset    No Known Problems Mother     No Known Problems Father      Social History     Tobacco Use    Smoking status: Never    Smokeless tobacco: Never   Substance Use Topics    Alcohol use: Yes     Comment: social     ALLERGIES:  No Known Allergies       Subjective   Review of Systems   Genitourinary:         Dark urine   Musculoskeletal:  Positive for arthralgias.   All other systems reviewed and are negative.      Objective   Physical Exam  Musculoskeletal:      Left shoulder: Tenderness present. Decreased range of motion.      Comments: Unable to lift arm straight out to side or up above head       Constitutional: Vincent is oriented to person, place, and time. Vital signs are normal. Appears well-developed and well-nourished.   Head: Normocephalic and atraumatic.Eyes:PERRL, EOMI, Conjunctiva normal, No discharge.    Neck: Full passive range of motion. Non-tender on palpation. Neck supple. No thyromegaly present. Trachea normal.  Neurological: Alert and oriented to person, place, and time. Normal motor function, Normal sensory function, No focal deficits noted.   He has normal strength.  Skin: Skin is warm, dry and intact. No obvious lesions on exposed skin  Psychiatric: Normal

## 2024-12-18 ENCOUNTER — HOSPITAL ENCOUNTER (OUTPATIENT)
Dept: PHYSICAL THERAPY | Facility: CLINIC | Age: 79
Setting detail: THERAPIES SERIES
Discharge: HOME | End: 2024-12-18
Payer: MEDICARE

## 2024-12-18 PROCEDURE — 97161 PT EVAL LOW COMPLEX 20 MIN: CPT

## 2024-12-18 PROCEDURE — 97110 THERAPEUTIC EXERCISES: CPT

## 2024-12-18 NOTE — CONSULTS
Exercises:  Exercise Reps/ Time Weight/ Level Comments   Wall slides  10x3s  Flexion/abduction   Table slides  10x3s  Flexion/abduction   Shoulder AAROM 10x ea   Flexion,abduction,ER,Horz abd    Tband Rows  10x Lime  Max cuing for proper biomechanics to avoid UT compensation    Tband Extension 10x Lime     Other:    Specific Instructions for next treatment:  Shoulder AAROM/ROM  RTC/shoulder girdle/postural exercises  Ice/vaso as needed     Evaluation Complexity:  History (Personal factors, comorbidities) [] 0 [x] 1-2 [] 3+   Exam (limitations, restrictions) [x] 1-2 [] 3 [] 4+   Clinical presentation (progression) [x] Stable [] Evolving  [] Unstable   Decision Making [x] Low [] Moderate [] High    [x] Low Complexity [] Moderate Complexity [] High Complexity        Treatment Charges: Mins Units Time In/Out   [x] Evaluation       [x]  Low       []  Moderate       []  High 32 1 8:02 - 8:34 am   []  Modalities        [x]  Ther Exercise 16 1 8:34 -8:50 am   []  Neuromuscular Re-ed      []  Gait Training      []  Manual Therapy      []  Ther Activities      []  Aquatics      []  Vasocompression      []  Cervical Traction      []  Other      Total Billable time 48 min 2 8:02 - 8:50 am    Time at end of session unbilled for scheduling       Time in: 8:02 am    Time Out: 8:54 am    Electronically signed by: Aan Mckinley PT        Physician Signature:________________________________Date:__________________  By signing above or cosigning this note, I have reviewed this plan of care and certify a need for medically necessary rehabilitation services.     *PLEASE SIGN ABOVE AND FAX BACK ALL PAGES*

## 2024-12-24 ENCOUNTER — HOSPITAL ENCOUNTER (OUTPATIENT)
Dept: PHYSICAL THERAPY | Facility: CLINIC | Age: 79
Setting detail: THERAPIES SERIES
Discharge: HOME | End: 2024-12-24
Payer: MEDICARE

## 2024-12-24 PROCEDURE — 97110 THERAPEUTIC EXERCISES: CPT

## 2024-12-24 NOTE — FLOWSHEET NOTE
[] [facility]  Outpatient Rehabilitation &  Therapy  [address]  [phone]  F: [phone] [x] [facility]  Outpatient Rehabilitation &  Therapy  [address]   [address]  P: [phone]  F: [phone] [] [facility]  Outpatient Rehabilitation &  Therapy  [address]  P: [phone]  F: [phone] [] [facility]  Outpatient Rehabilitation &  Therapy  [address]  P: [phone]  F: [phone] [] [facility]  Outpatient Rehabilitation &  Therapy  [address]   [address]   P: [phone]  F: [phone]      Physical Therapy Daily Treatment Note    Date:  2024  Patient Name:  Vincent Carrasco    :  1945  MRN: [id number]    Physician: Ella Jerome APRN - CPN                              Insurance: MEDICARE/[employer] SUPPLE[employer] ([employer])  Medical Diagnosis:   W19.XXXD (ICD-10-CM) - Fall, subsequent encounter   S49.92XD (ICD-10-CM) - Injury of left shoulder, subsequent encounter   Rehab Codes: M25.512, M25.612, M62.512  Onset Date: 24             Next 's appt: 25    Visit# / total visits: ; Progress note for Medicare patient due at visit 10   Frequency: 2x/week    Cancels/No Shows: 0/0    Subjective:    Pain:  [x] Yes  [] No Location: Left Shoulder Pain Rating: (0-10 scale) 3/10  Pain altered Tx:  [x] No  [] Yes  Action:  Comments: Patient arrives stating he feels like he is making progress, but does continue to have discomfort with things such as pulling the car door shut when driving and turning the steering wheel with his LUE.    Patient Goals: Pain free ROM     Objective:  Modalities:   Precautions: N/A  Exercises: Bolded exercises completed 2024  Exercise Reps/ Time Weight/ Level Comments   UBE 2 min forward/retro L2          Supine      AROM 10x ea  Flexion,abduction,ER,Horz abd   Pain free ROM                                       Sitting

## 2024-12-27 ENCOUNTER — HOSPITAL ENCOUNTER (OUTPATIENT)
Dept: PHYSICAL THERAPY | Facility: CLINIC | Age: 79
Setting detail: THERAPIES SERIES
Discharge: HOME | End: 2024-12-27
Payer: MEDICARE

## 2024-12-27 PROCEDURE — 97110 THERAPEUTIC EXERCISES: CPT

## 2024-12-27 NOTE — FLOWSHEET NOTE
functional limitations and mobility  []  []  []      4. Independent with Home Exercise Programs with ability to demonstrate exercises without cueing for technique.  []  []  []      5. Demonstrate knowledge of fall risk prevention  in order to prevent falls at home, work, or in the community. []  []  []        []  []  []      Date Addressed:            LTG: To be met in 12 treatments           1. Improve score on assessment tool UEFS from 50% impairment to less than 35% impairment to demonstrate improved functional mobility []  []  []      2. Reduce pain levels to 2/10 or less with shoulder abduction. []  []  []      3. Pt must demo ability to lift greater than 20# with no increase in pain to improve overall functional mobility. []  []  []                                   Pt. Education:  [x] Yes  [] No  [x] Reviewed Prior HEP/Ed  Method of Education: [x] Verbal  [x] Demo  [x] Written    URL: [email or web address]  Date: 12/18/2024  Prepared by: Ana Mckinley     Exercises  - Shoulder Flexion Wall Slide with Towel  - 1 x daily - 7 x weekly - 2 sets - 10 reps  - Standing Shoulder Abduction Slides at Wall  - 1 x daily - 7 x weekly - 2 sets - 10 reps  - Seated Shoulder Abduction Towel Slide at Table Top  - 1 x daily - 7 x weekly - 2 sets - 10 reps  - Supine Shoulder Flexion Extension AAROM with Dowel  - 1 x daily - 7 x weekly - 2 sets - 10 reps  - Supine Shoulder Abduction AAROM with Dowel  - 1 x daily - 7 x weekly - 2 sets - 10 reps  - Standing Shoulder Row with Anchored Resistance  - 1 x daily - 7 x weekly - 2 sets - 10 reps  - Shoulder extension with resistance - Neutral  - 1 x daily - 7 x weekly - 2 sets - 10 reps  - Seated Upper Trapezius Stretch  - 1 x daily - 7 x weekly - 2 sets - 30 s hold  - Seated Levator Scapulae Stretch  - 1 x daily - 7 x weekly - 2 sets - 30 s hold    Comprehension of Education:  [x] Verbalizes understanding.  [x] Demonstrates understanding.  [] Needs review.  []

## 2025-01-02 ENCOUNTER — HOSPITAL ENCOUNTER (OUTPATIENT)
Dept: PHYSICAL THERAPY | Facility: CLINIC | Age: 80
Setting detail: THERAPIES SERIES
Discharge: HOME | End: 2025-01-02
Payer: MEDICARE

## 2025-01-02 PROCEDURE — 97110 THERAPEUTIC EXERCISES: CPT

## 2025-01-02 NOTE — FLOWSHEET NOTE
1# Flexion,abduction,ER,Horz abd   Pain free ROM               Sidelying    Added 12/27   ER 10x     Flexion 10x     Horz Abduction 10x     Abduction 6x  Held due to pain after 6 reps          Sitting      Pulleys  -Flexion/Abduction 1 min ea  Easy   Scapular Retraction 15x3s     Chin Tucks 15x3s     ER + Scapular Retraction 15x3s     Levator Stretch 30s ea     UT Stretch   30s ea  Added 12/27   Backward shoulder rolls  15x     3-way Bicep Curls  2x10 3#          Standing      Wall Slides   Painful   Rows 2x10 Lime Max cueing for proper biomechanics to avoid UT compensation    Extension 2x10 Lime    ER/ER 20x ea Lime Max cues for technique   Isometrics 10x5s ea  Flexion, add, abd, extension, IR/ER  Abduction is uncomfortable         Ball on wall  -Horizontal/vertical 30s ea  Painful   Doorway Pec Stretch 2x30s  Uncomfortable when released                                                                                       Other                                                Other:    Specific Instructions for subsequent treatments:  Shoulder AAROM/ROM  RTC/shoulder girdle/postural exercises  Ice/vaso as needed       Assessment: [x] Progressing toward goals. Pt began session with UBE to increase blood flow and ROM prior to session. Pt completed exercises as listed above. Increased pain wall slides and BOW exercises. No progressions were made this session due to time constraints due to patient arriving late to appointment.     [] No change.     [] Other:  [x] Patient would continue to benefit from skilled physical therapy services in order to: improve left shoulder ROM/strength and decrease pain to improve overall function.     Goals  MET NOT MET ON-  GOING  Details   Date Addressed:            STG: To be met in 8 treatments            1. ? Pain: Decrease pain levels to 5/10 to ease ADL progression. []  []  []      2. ? ROM: Increase ROM limitations throughout to equal bilat to reduce difficulty with ADLs []  []  []

## 2025-01-07 ENCOUNTER — HOSPITAL ENCOUNTER (OUTPATIENT)
Dept: PHYSICAL THERAPY | Facility: CLINIC | Age: 80
Setting detail: THERAPIES SERIES
Discharge: HOME | End: 2025-01-07
Payer: MEDICARE

## 2025-01-07 PROCEDURE — 97110 THERAPEUTIC EXERCISES: CPT

## 2025-01-07 PROCEDURE — 97016 VASOPNEUMATIC DEVICE THERAPY: CPT

## 2025-01-07 NOTE — FLOWSHEET NOTE
lift greater than 20# with no increase in pain to improve overall functional mobility. []  []  []                                   Pt. Education:  [x] Yes  [] No  [x] Reviewed Prior HEP/Ed  Method of Education: [x] Verbal  [x] Demo  [] Written    URL: [email or web address]  Date: 12/18/2024  Prepared by: Ana Mckinley     Exercises  - Shoulder Flexion Wall Slide with Towel  - 1 x daily - 7 x weekly - 2 sets - 10 reps  - Standing Shoulder Abduction Slides at Wall  - 1 x daily - 7 x weekly - 2 sets - 10 reps  - Seated Shoulder Abduction Towel Slide at Table Top  - 1 x daily - 7 x weekly - 2 sets - 10 reps  - Supine Shoulder Flexion Extension AAROM with Dowel  - 1 x daily - 7 x weekly - 2 sets - 10 reps  - Supine Shoulder Abduction AAROM with Dowel  - 1 x daily - 7 x weekly - 2 sets - 10 reps  - Standing Shoulder Row with Anchored Resistance  - 1 x daily - 7 x weekly - 2 sets - 10 reps  - Shoulder extension with resistance - Neutral  - 1 x daily - 7 x weekly - 2 sets - 10 reps  - Seated Upper Trapezius Stretch  - 1 x daily - 7 x weekly - 2 sets - 30 s hold  - Seated Levator Scapulae Stretch  - 1 x daily - 7 x weekly - 2 sets - 30 s hold    Comprehension of Education:  [x] Verbalizes understanding.  [x] Demonstrates understanding.  [] Needs review.  [] Demonstrates/verbalizes HEP/Ed previously given.         Plan: [x] Continue current frequency toward long and short term goals.           Treatment Charges: Mins Units   []  Modalities       [x]  Ther Exercise 45 3   []  Neuromuscular Re-ed     []  Gait Training     []  Manual Therapy     []  Ther Activities     []  Aquatics     [x]  Vasocompression 10 1   []  Cervical Traction     []  Other     Total Billable time 55 4   4 min unbilled for warm-up    Time in: 3:00 pm      Time out: 3:59 pm    Electronically signed by:  JOSHUA SANCHEZ PTA

## 2025-01-08 ENCOUNTER — HOSPITAL ENCOUNTER (OUTPATIENT)
Dept: MRI IMAGING | Age: 80
Discharge: HOME | End: 2025-01-10
Payer: MEDICARE

## 2025-01-08 ENCOUNTER — OFFICE VISIT (OUTPATIENT)
Dept: FAMILY MEDICINE CLINIC | Age: 80
End: 2025-01-08
Payer: MEDICARE

## 2025-01-08 VITALS
OXYGEN SATURATION: 94 % | HEART RATE: 86 BPM | BODY MASS INDEX: 29.37 KG/M2 | WEIGHT: 235 LBS | TEMPERATURE: 96.9 F | SYSTOLIC BLOOD PRESSURE: 118 MMHG | DIASTOLIC BLOOD PRESSURE: 80 MMHG

## 2025-01-08 DIAGNOSIS — Z09 FOLLOW-UP EXAM AFTER TREATMENT: Primary | ICD-10-CM

## 2025-01-08 DIAGNOSIS — F32.2 CURRENT SEVERE EPISODE OF MAJOR DEPRESSIVE DISORDER WITHOUT PSYCHOTIC FEATURES WITHOUT PRIOR EPISODE (HCC): ICD-10-CM

## 2025-01-08 DIAGNOSIS — S49.92XD INJURY OF LEFT SHOULDER, SUBSEQUENT ENCOUNTER: ICD-10-CM
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
## 2025-01-08 DIAGNOSIS — Z78.9 FAILURE OF OUTPATIENT TREATMENT: ICD-10-CM

## 2025-01-08 PROCEDURE — 1123F ACP DISCUSS/DSCN MKR DOCD: CPT | Performed by: NURSE PRACTITIONER

## 2025-01-08 PROCEDURE — 3074F SYST BP LT 130 MM HG: CPT | Performed by: NURSE PRACTITIONER

## 2025-01-08 PROCEDURE — G8427 DOCREV CUR MEDS BY ELIG CLIN: HCPCS | Performed by: NURSE PRACTITIONER

## 2025-01-08 PROCEDURE — 1159F MED LIST DOCD IN RCRD: CPT | Performed by: NURSE PRACTITIONER

## 2025-01-08 PROCEDURE — 1036F TOBACCO NON-USER: CPT | Performed by: NURSE PRACTITIONER

## 2025-01-08 PROCEDURE — 99214 OFFICE O/P EST MOD 30 MIN: CPT | Performed by: NURSE PRACTITIONER

## 2025-01-08 PROCEDURE — 73221 MRI JOINT UPR EXTREM W/O DYE: CPT

## 2025-01-08 PROCEDURE — 1160F RVW MEDS BY RX/DR IN RCRD: CPT | Performed by: NURSE PRACTITIONER

## 2025-01-08 PROCEDURE — M1299 PR FLU IMMUNIZE ORDER/ADMIN: HCPCS | Performed by: NURSE PRACTITIONER

## 2025-01-08 PROCEDURE — G8417 CALC BMI ABV UP PARAM F/U: HCPCS | Performed by: NURSE PRACTITIONER

## 2025-01-08 PROCEDURE — 3079F DIAST BP 80-89 MM HG: CPT | Performed by: NURSE PRACTITIONER

## 2025-01-08 SDOH — ECONOMIC STABILITY: FOOD INSECURITY: WITHIN THE PAST 12 MONTHS, THE FOOD YOU BOUGHT JUST DIDN'T LAST AND YOU DIDN'T HAVE MONEY TO GET MORE.: NEVER TRUE

## 2025-01-08 SDOH — ECONOMIC STABILITY: FOOD INSECURITY: WITHIN THE PAST 12 MONTHS, YOU WORRIED THAT YOUR FOOD WOULD RUN OUT BEFORE YOU GOT MONEY TO BUY MORE.: NEVER TRUE

## 2025-01-08 NOTE — PROGRESS NOTES
Ella Jerome, [facility]  [address]  [place]  [address]  [phone]  [phone]    Vincent Carrasco is a 79 y.o. male who is here with c/o of:    Chief Complaint: Other (Recheck Lt Shoulder pain)      Patient Accompanied by: n/a    HPI - Vincent Carrasco is here today with c/o:    History of Present Illness  The patient is a 79-year-old male who presents for a follow-up of his shoulder.    He reports persistent difficulty in reaching out to close his car door and ascending stairs, with no significant improvement in these areas. He has undergone 5 physical therapy sessions, which have been beneficial in managing his overall pain. The intensity of his pain fluctuates between 3 and 5 on a scale of 10, occasionally radiating down the arms, anteriorly, posteriorly, and superiorly. He describes the pain as tolerable during routine activities but escalating to a level of 5 or 6 when seated for reading or office work. He experiences discomfort when extending his arm skilled nursing but notes that the pain subsides upon full extension. He suspects a potential tear in his shoulder. He plans to continue his exercises during an upcoming vacation, which will necessitate a break from his physical therapy sessions.    Supplemental Information  He had knee surgery performed by Dr. Ibrahim.       There are no preventive care reminders to display for this patient.     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

## 2025-01-09 ENCOUNTER — APPOINTMENT (OUTPATIENT)
Dept: PHYSICAL THERAPY | Facility: CLINIC | Age: 80
End: 2025-01-09
Payer: MEDICARE

## 2025-03-20 ENCOUNTER — OFFICE VISIT (OUTPATIENT)
Dept: FAMILY MEDICINE CLINIC | Age: 80
End: 2025-03-20
Payer: MEDICARE

## 2025-03-20 VITALS
DIASTOLIC BLOOD PRESSURE: 80 MMHG | HEART RATE: 68 BPM | OXYGEN SATURATION: 95 % | SYSTOLIC BLOOD PRESSURE: 104 MMHG | WEIGHT: 250 LBS | BODY MASS INDEX: 31.25 KG/M2 | TEMPERATURE: 97.3 F

## 2025-03-20 DIAGNOSIS — R97.20 ELEVATED PSA: ICD-10-CM

## 2025-03-20 DIAGNOSIS — R68.82 DECREASED LIBIDO: ICD-10-CM

## 2025-03-20 DIAGNOSIS — F32.2 CURRENT SEVERE EPISODE OF MAJOR DEPRESSIVE DISORDER WITHOUT PSYCHOTIC FEATURES WITHOUT PRIOR EPISODE (HCC): Primary | ICD-10-CM

## 2025-03-20 DIAGNOSIS — I10 ESSENTIAL HYPERTENSION: ICD-10-CM

## 2025-03-20 PROCEDURE — 1123F ACP DISCUSS/DSCN MKR DOCD: CPT | Performed by: NURSE PRACTITIONER

## 2025-03-20 PROCEDURE — 1036F TOBACCO NON-USER: CPT | Performed by: NURSE PRACTITIONER

## 2025-03-20 PROCEDURE — 3074F SYST BP LT 130 MM HG: CPT | Performed by: NURSE PRACTITIONER

## 2025-03-20 PROCEDURE — G8427 DOCREV CUR MEDS BY ELIG CLIN: HCPCS | Performed by: NURSE PRACTITIONER

## 2025-03-20 PROCEDURE — G8417 CALC BMI ABV UP PARAM F/U: HCPCS | Performed by: NURSE PRACTITIONER

## 2025-03-20 PROCEDURE — 1159F MED LIST DOCD IN RCRD: CPT | Performed by: NURSE PRACTITIONER

## 2025-03-20 PROCEDURE — 1160F RVW MEDS BY RX/DR IN RCRD: CPT | Performed by: NURSE PRACTITIONER

## 2025-03-20 PROCEDURE — 3079F DIAST BP 80-89 MM HG: CPT | Performed by: NURSE PRACTITIONER

## 2025-03-20 PROCEDURE — 99214 OFFICE O/P EST MOD 30 MIN: CPT | Performed by: NURSE PRACTITIONER

## 2025-03-20 RX ORDER — TAMSULOSIN HYDROCHLORIDE 0.4 MG/1
0.4 CAPSULE ORAL NIGHTLY
Qty: 90 CAPSULE | Refills: 1 | Status: SHIPPED | OUTPATIENT
Start: 2025-03-20

## 2025-03-20 RX ORDER — LISINOPRIL AND HYDROCHLOROTHIAZIDE 20; 25 MG/1; MG/1
1 TABLET ORAL DAILY
Qty: 90 TABLET | Refills: 1 | Status: SHIPPED | OUTPATIENT
Start: 2025-03-20

## 2025-03-20 SDOH — ECONOMIC STABILITY: FOOD INSECURITY: WITHIN THE PAST 12 MONTHS, YOU WORRIED THAT YOUR FOOD WOULD RUN OUT BEFORE YOU GOT MONEY TO BUY MORE.: NEVER TRUE

## 2025-03-20 SDOH — ECONOMIC STABILITY: FOOD INSECURITY: WITHIN THE PAST 12 MONTHS, THE FOOD YOU BOUGHT JUST DIDN'T LAST AND YOU DIDN'T HAVE MONEY TO GET MORE.: NEVER TRUE

## 2025-03-20 ASSESSMENT — PATIENT HEALTH QUESTIONNAIRE - PHQ9
7. TROUBLE CONCENTRATING ON THINGS, SUCH AS READING THE NEWSPAPER OR WATCHING TELEVISION: NOT AT ALL
6. FEELING BAD ABOUT YOURSELF - OR THAT YOU ARE A FAILURE OR HAVE LET YOURSELF OR YOUR FAMILY DOWN: NOT AT ALL
SUM OF ALL RESPONSES TO PHQ QUESTIONS 1-9: 7
SUM OF ALL RESPONSES TO PHQ QUESTIONS 1-9: 7
5. POOR APPETITE OR OVEREATING: NOT AT ALL
1. LITTLE INTEREST OR PLEASURE IN DOING THINGS: NOT AT ALL
9. THOUGHTS THAT YOU WOULD BE BETTER OFF DEAD, OR OF HURTING YOURSELF: NOT AT ALL
8. MOVING OR SPEAKING SO SLOWLY THAT OTHER PEOPLE COULD HAVE NOTICED. OR THE OPPOSITE, BEING SO FIGETY OR RESTLESS THAT YOU HAVE BEEN MOVING AROUND A LOT MORE THAN USUAL: NOT AT ALL
2. FEELING DOWN, DEPRESSED OR HOPELESS: NEARLY EVERY DAY
SUM OF ALL RESPONSES TO PHQ QUESTIONS 1-9: 7
3. TROUBLE FALLING OR STAYING ASLEEP: NEARLY EVERY DAY
SUM OF ALL RESPONSES TO PHQ QUESTIONS 1-9: 7
4. FEELING TIRED OR HAVING LITTLE ENERGY: SEVERAL DAYS
10. IF YOU CHECKED OFF ANY PROBLEMS, HOW DIFFICULT HAVE THESE PROBLEMS MADE IT FOR YOU TO DO YOUR WORK, TAKE CARE OF THINGS AT HOME, OR GET ALONG WITH OTHER PEOPLE: NOT DIFFICULT AT ALL

## 2025-03-20 NOTE — PROGRESS NOTES
Ella Jerome, [facility]  [address]  [place]  [address]  [phone]  [phone]    Vincent Carrasco is a 79 y.o. male who is here with c/o of:    Chief Complaint: Other (Discuss getting lab work and refills needed)      Patient Accompanied by: n/a    HPI - Vincent Carrasco is here today with c/o:    History of Present Illness  The patient is a 79-year-old male who presents for lab work, shoulder pain, depression, elevated PSA, and blood pressure management.    He has been experiencing shoulder discomfort, which is scheduled for surgical intervention at the end of 04/2025. He has been managing the pain with ibuprofen, initially taking 4 doses in the morning and evening, but has since reduced to 3 doses at night. His range of motion remains satisfactory, although he experiences pain when reaching for his car door. He has previously undergone physical therapy and continues to perform the prescribed exercises independently. He also reports a popping sensation in his shoulder. An MRI scan was conducted, and he has consulted with Dr. Rich regarding his condition. He experiences discomfort when rolling over onto his shoulder during sleep and occasionally needs to manually lift his arm when lying on it.    He reports experiencing depressive episodes, characterized by lethargy and lack of motivation. He has previously been prescribed Zoloft but discontinued its use. He is considering resuming the medication and is seeking advice on this matter. He also reports a decrease in libido and is questioning whether this could be a side effect of Zoloft. He has not had his testosterone levels checked.    He has been diagnosed with an elevated PSA level and is under the care of a urologist, with a follow-up appointment scheduled for 07/01/2025. He has not had a repeat PSA test since his last hospital visit. He is currently on Flomax, which has reduced his nighttime urination to once per night.

## 2025-04-04 ENCOUNTER — HOSPITAL ENCOUNTER (OUTPATIENT)
Age: 80
Setting detail: SPECIMEN
Discharge: HOME | End: 2025-04-04

## 2025-04-04 DIAGNOSIS — I10 ESSENTIAL HYPERTENSION: ICD-10-CM

## 2025-04-04 DIAGNOSIS — F32.2 CURRENT SEVERE EPISODE OF MAJOR DEPRESSIVE DISORDER WITHOUT PSYCHOTIC FEATURES WITHOUT PRIOR EPISODE (HCC): ICD-10-CM

## 2025-04-04 DIAGNOSIS — R97.20 ELEVATED PSA: ICD-10-CM

## 2025-04-04 DIAGNOSIS — R68.82 DECREASED LIBIDO: ICD-10-CM

## 2025-04-04 LAB
ALBUMIN SERPL-MCNC: 4.5 G/DL (ref 3.5–5.2)
ALBUMIN/GLOB SERPL: 2 {RATIO} (ref 1–2.5)
ALP SERPL-CCNC: 60 U/L (ref 40–129)
ALT SERPL-CCNC: 23 U/L (ref 10–50)
ANION GAP SERPL CALCULATED.3IONS-SCNC: 11 MMOL/L (ref 9–16)
AST SERPL-CCNC: 25 U/L (ref 10–50)
BASOPHILS # BLD: 0.06 K/UL (ref 0–0.2)
BASOPHILS NFR BLD: 1 % (ref 0–2)
BILIRUB SERPL-MCNC: 0.9 MG/DL (ref 0–1.2)
BUN SERPL-MCNC: 24 MG/DL (ref 8–23)
CALCIUM SERPL-MCNC: 10 MG/DL (ref 8.6–10.4)
CHLORIDE SERPL-SCNC: 106 MMOL/L (ref 98–107)
CHOLEST SERPL-MCNC: 240 MG/DL (ref 0–199)
CHOLESTEROL/HDL RATIO: 6.5
CO2 SERPL-SCNC: 28 MMOL/L (ref 20–31)
CREAT SERPL-MCNC: 1.5 MG/DL (ref 0.7–1.2)
EOSINOPHIL # BLD: 0.19 K/UL (ref 0–0.44)
EOSINOPHILS RELATIVE PERCENT: 3 % (ref 1–4)
ERYTHROCYTE [DISTWIDTH] IN BLOOD BY AUTOMATED COUNT: 13 % (ref 11.8–14.4)
GFR, ESTIMATED: 47 ML/MIN/1.73M2
GLUCOSE SERPL-MCNC: 109 MG/DL (ref 74–99)
HCT VFR BLD AUTO: 46 % (ref 40.7–50.3)
HDLC SERPL-MCNC: 37 MG/DL
HGB BLD-MCNC: 15.8 G/DL (ref 13–17)
IMM GRANULOCYTES # BLD AUTO: <0.03 K/UL (ref 0–0.3)
IMM GRANULOCYTES NFR BLD: 0 %
LDLC SERPL CALC-MCNC: 149 MG/DL (ref 0–100)
LYMPHOCYTES NFR BLD: 1.12 K/UL (ref 1.1–3.7)
LYMPHOCYTES RELATIVE PERCENT: 19 % (ref 24–43)
MCH RBC QN AUTO: 31.2 PG (ref 25.2–33.5)
MCHC RBC AUTO-ENTMCNC: 34.3 G/DL (ref 28.4–34.8)
MCV RBC AUTO: 90.9 FL (ref 82.6–102.9)
MONOCYTES NFR BLD: 0.48 K/UL (ref 0.1–1.2)
MONOCYTES NFR BLD: 8 % (ref 3–12)
NEUTROPHILS NFR BLD: 69 % (ref 36–65)
NEUTS SEG NFR BLD: 3.93 K/UL (ref 1.5–8.1)
NRBC BLD-RTO: 0 PER 100 WBC
PLATELET # BLD AUTO: 125 K/UL (ref 138–453)
PMV BLD AUTO: 11.7 FL (ref 8.1–13.5)
POTASSIUM SERPL-SCNC: 4.4 MMOL/L (ref 3.7–5.3)
PROT SERPL-MCNC: 6.8 G/DL (ref 6.6–8.7)
PSA SERPL-MCNC: 11.9 NG/ML (ref 0–4)
RBC # BLD AUTO: 5.06 M/UL (ref 4.21–5.77)
SODIUM SERPL-SCNC: 145 MMOL/L (ref 136–145)
TESTOST SERPL-MCNC: 500 NG/DL (ref 193–740)
TRIGL SERPL-MCNC: 269 MG/DL (ref 0–149)
TSH SERPL DL<=0.05 MIU/L-ACNC: 2.82 UIU/ML (ref 0.27–4.2)
VLDLC SERPL CALC-MCNC: 54 MG/DL (ref 1–30)
WBC OTHER # BLD: 5.8 K/UL (ref 3.5–11.3)

## 2025-04-07 ENCOUNTER — RESULTS FOLLOW-UP (OUTPATIENT)
Dept: FAMILY MEDICINE CLINIC | Age: 80
End: 2025-04-07

## 2025-04-07 DIAGNOSIS — E78.2 MIXED HYPERLIPIDEMIA: Primary | ICD-10-CM

## 2025-04-07 RX ORDER — ROSUVASTATIN CALCIUM 10 MG/1
10 TABLET, COATED ORAL NIGHTLY
Qty: 90 TABLET | Refills: 1 | Status: SHIPPED | OUTPATIENT
Start: 2025-04-07

## 2025-04-21 ENCOUNTER — OFFICE VISIT (OUTPATIENT)
Dept: FAMILY MEDICINE CLINIC | Age: 80
End: 2025-04-21
Payer: MEDICARE

## 2025-04-21 VITALS
BODY MASS INDEX: 30.12 KG/M2 | HEART RATE: 86 BPM | WEIGHT: 241 LBS | TEMPERATURE: 97.4 F | SYSTOLIC BLOOD PRESSURE: 100 MMHG | OXYGEN SATURATION: 96 % | DIASTOLIC BLOOD PRESSURE: 60 MMHG

## 2025-04-21 DIAGNOSIS — I10 ESSENTIAL HYPERTENSION: Chronic | ICD-10-CM

## 2025-04-21 DIAGNOSIS — G51.0 BELL'S PALSY: ICD-10-CM

## 2025-04-21 DIAGNOSIS — R09.89 CHEST CONGESTION: ICD-10-CM

## 2025-04-21 DIAGNOSIS — J01.90 ACUTE SINUSITIS, RECURRENCE NOT SPECIFIED, UNSPECIFIED LOCATION: ICD-10-CM

## 2025-04-21 DIAGNOSIS — H66.91 RIGHT OTITIS MEDIA, UNSPECIFIED OTITIS MEDIA TYPE: Primary | ICD-10-CM

## 2025-04-21 LAB
INFLUENZA A ANTIBODY: NORMAL
INFLUENZA B ANTIBODY: NORMAL
Lab: NORMAL
QC PASS/FAIL: NORMAL
SARS-COV-2 RDRP RESP QL NAA+PROBE: NEGATIVE

## 2025-04-21 PROCEDURE — 1159F MED LIST DOCD IN RCRD: CPT | Performed by: NURSE PRACTITIONER

## 2025-04-21 PROCEDURE — 3074F SYST BP LT 130 MM HG: CPT | Performed by: NURSE PRACTITIONER

## 2025-04-21 PROCEDURE — 1123F ACP DISCUSS/DSCN MKR DOCD: CPT | Performed by: NURSE PRACTITIONER

## 2025-04-21 PROCEDURE — 87635 SARS-COV-2 COVID-19 AMP PRB: CPT | Performed by: NURSE PRACTITIONER

## 2025-04-21 PROCEDURE — 3078F DIAST BP <80 MM HG: CPT | Performed by: NURSE PRACTITIONER

## 2025-04-21 PROCEDURE — 87804 INFLUENZA ASSAY W/OPTIC: CPT | Performed by: NURSE PRACTITIONER

## 2025-04-21 PROCEDURE — G8427 DOCREV CUR MEDS BY ELIG CLIN: HCPCS | Performed by: NURSE PRACTITIONER

## 2025-04-21 PROCEDURE — 1036F TOBACCO NON-USER: CPT | Performed by: NURSE PRACTITIONER

## 2025-04-21 PROCEDURE — 99214 OFFICE O/P EST MOD 30 MIN: CPT | Performed by: NURSE PRACTITIONER

## 2025-04-21 PROCEDURE — G8417 CALC BMI ABV UP PARAM F/U: HCPCS | Performed by: NURSE PRACTITIONER

## 2025-04-21 PROCEDURE — 1160F RVW MEDS BY RX/DR IN RCRD: CPT | Performed by: NURSE PRACTITIONER

## 2025-04-21 ASSESSMENT — ENCOUNTER SYMPTOMS
GASTROINTESTINAL NEGATIVE: 1
CHEST TIGHTNESS: 0
ALLERGIC/IMMUNOLOGIC NEGATIVE: 1
EYES NEGATIVE: 1
ABDOMINAL PAIN: 0
SHORTNESS OF BREATH: 0
RESPIRATORY NEGATIVE: 1
COUGH: 0

## 2025-04-21 NOTE — PROGRESS NOTES
Head: Normocephalic and atraumatic.      Nose: Nose normal.   Eyes:      Pupils: Pupils are equal, round, and reactive to light.   Cardiovascular:      Rate and Rhythm: Normal rate and regular rhythm.   Pulmonary:      Effort: Pulmonary effort is normal.      Breath sounds: Normal breath sounds.   Abdominal:      General: Bowel sounds are normal.   Musculoskeletal:      Cervical back: Normal range of motion and neck supple.   Skin:     General: Skin is warm and dry.   Neurological:      General: No focal deficit present.      Mental Status: He is alert. Mental status is at baseline.   Psychiatric:         Mood and Affect: Mood normal.         Behavior: Behavior normal.         Thought Content: Thought content normal.         ASSESSMENT/PLAN:     1. Right otitis media, unspecified otitis media type  -     amoxicillin-clavulanate (AUGMENTIN) 875-125 MG per tablet; Take 1 tablet by mouth 2 times daily for 7 days, Disp-14 tablet, R-0Normal  2. Chest congestion  -     POCT Influenza A/B  -     POCT COVID-19 Rapid, NAAT  3. Acute sinusitis, recurrence not specified, unspecified location  4. Essential hypertension  5. Bell's palsy     Take prescribed antibiotics for right ear infection. Encouraged increase fluid intake, use of humidifier, and decongestants.   POC flu and covid results negative, likely viral mixed with otitis media. Encouraged supportive care to alleviate symptoms.  Monitor BP daily and document to provide readings at appointment with PCP 4/30/25.  Encouraged to change positions slowly and adequate oral hydration.    On this date, 4/21/2025 I have spent 33 minutes reviewing previous notes, test results and face to face with the patient discussing the diagnosis and importance of compliance with the treatment plan as well as documenting on the day of the visit.      Return in about 9 days (around 4/30/2025), or if symptoms worsen or fail to improve, for ear recheck.    Electronically signed by Madeline

## 2025-04-30 ENCOUNTER — OFFICE VISIT (OUTPATIENT)
Dept: FAMILY MEDICINE CLINIC | Age: 80
End: 2025-04-30
Payer: MEDICARE

## 2025-04-30 VITALS
DIASTOLIC BLOOD PRESSURE: 80 MMHG | SYSTOLIC BLOOD PRESSURE: 120 MMHG | OXYGEN SATURATION: 96 % | BODY MASS INDEX: 30.12 KG/M2 | WEIGHT: 241 LBS | TEMPERATURE: 97.4 F | HEART RATE: 71 BPM

## 2025-04-30 DIAGNOSIS — I10 ESSENTIAL HYPERTENSION: Primary | ICD-10-CM

## 2025-04-30 DIAGNOSIS — F33.0 MILD EPISODE OF RECURRENT MAJOR DEPRESSIVE DISORDER: ICD-10-CM

## 2025-04-30 DIAGNOSIS — E78.2 MIXED HYPERLIPIDEMIA: ICD-10-CM

## 2025-04-30 DIAGNOSIS — G51.0 BELL'S PALSY: ICD-10-CM

## 2025-04-30 DIAGNOSIS — F32.2 CURRENT SEVERE EPISODE OF MAJOR DEPRESSIVE DISORDER WITHOUT PSYCHOTIC FEATURES WITHOUT PRIOR EPISODE (HCC): ICD-10-CM

## 2025-04-30 DIAGNOSIS — Z86.73 HISTORY OF STROKE: ICD-10-CM

## 2025-04-30 PROCEDURE — 1160F RVW MEDS BY RX/DR IN RCRD: CPT | Performed by: NURSE PRACTITIONER

## 2025-04-30 PROCEDURE — 1159F MED LIST DOCD IN RCRD: CPT | Performed by: NURSE PRACTITIONER

## 2025-04-30 PROCEDURE — 99214 OFFICE O/P EST MOD 30 MIN: CPT | Performed by: NURSE PRACTITIONER

## 2025-04-30 PROCEDURE — 3079F DIAST BP 80-89 MM HG: CPT | Performed by: NURSE PRACTITIONER

## 2025-04-30 PROCEDURE — 1036F TOBACCO NON-USER: CPT | Performed by: NURSE PRACTITIONER

## 2025-04-30 PROCEDURE — G8427 DOCREV CUR MEDS BY ELIG CLIN: HCPCS | Performed by: NURSE PRACTITIONER

## 2025-04-30 PROCEDURE — G8417 CALC BMI ABV UP PARAM F/U: HCPCS | Performed by: NURSE PRACTITIONER

## 2025-04-30 PROCEDURE — 1123F ACP DISCUSS/DSCN MKR DOCD: CPT | Performed by: NURSE PRACTITIONER

## 2025-04-30 PROCEDURE — 3074F SYST BP LT 130 MM HG: CPT | Performed by: NURSE PRACTITIONER

## 2025-04-30 NOTE — PROGRESS NOTES
shoulder pain is being taken. No numbness in arms or legs is reported, but shoulder discomfort is experienced. Ibuprofen is not taken for this.       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 Orantes    COLONOSCOPY  2013; due 2018    COLONOSCOPY N/A 07/07/2022    COLONOSCOPY DIAGNOSTIC performed by Amado Calle MD at [facility] OR    HERNIA REPAIR      rt. inguinal with mesh    JOINT REPLACEMENT  right knee    LAPAROTOMY N/A 7/24/2022    EXPLORATORY LAPAROTOMY LYSIS OF ADHESIONS UMBILICAL HERNIA REPAIR WITHOUT MESH performed by Luis Orantes DO at [facility] OR     Family History   Problem Relation Age of Onset    No Known Problems Mother     No Known Problems Father      Social History     Tobacco Use    Smoking status: Never    Smokeless tobacco: Never   Substance Use Topics    Alcohol use: Yes     Comment: social     ALLERGIES:  No Known Allergies       Subjective   Review of Systems   Neurological:  Positive for facial asymmetry and numbness.   All other systems reviewed and are negative.       Objective   Physical Exam  Neurological:      Mental

## 2025-05-07 ENCOUNTER — HOSPITAL ENCOUNTER (OUTPATIENT)
Age: 80
Setting detail: SPECIMEN
Discharge: HOME | End: 2025-05-07

## 2025-05-07 LAB
ALBUMIN SERPL-MCNC: 4.1 G/DL (ref 3.5–5.2)
ALBUMIN/GLOB SERPL: 1.8 {RATIO} (ref 1–2.5)
ALP SERPL-CCNC: 65 U/L (ref 40–129)
ALT SERPL-CCNC: 25 U/L (ref 10–50)
AST SERPL-CCNC: 27 U/L (ref 10–50)
BILIRUB DIRECT SERPL-MCNC: 0.3 MG/DL (ref 0–0.2)
BILIRUB INDIRECT SERPL-MCNC: 0.5 MG/DL (ref 0–1)
BILIRUB SERPL-MCNC: 0.8 MG/DL (ref 0–1.2)
CHOLEST SERPL-MCNC: 141 MG/DL (ref 0–199)
CHOLESTEROL/HDL RATIO: 3.4
GLOBULIN SER CALC-MCNC: 2.3 G/DL
HDLC SERPL-MCNC: 42 MG/DL
LDLC SERPL CALC-MCNC: 58 MG/DL (ref 0–100)
PROT SERPL-MCNC: 6.4 G/DL (ref 6.6–8.7)
TRIGL SERPL-MCNC: 203 MG/DL (ref 0–149)
VLDLC SERPL CALC-MCNC: 41 MG/DL (ref 1–30)

## 2025-05-08 ENCOUNTER — RESULTS FOLLOW-UP (OUTPATIENT)
Dept: FAMILY MEDICINE CLINIC | Age: 80
End: 2025-05-08

## 2025-06-06 ENCOUNTER — OFFICE VISIT (OUTPATIENT)
Dept: FAMILY MEDICINE CLINIC | Age: 80
End: 2025-06-06

## 2025-06-06 VITALS
HEART RATE: 69 BPM | BODY MASS INDEX: 29.75 KG/M2 | WEIGHT: 238 LBS | OXYGEN SATURATION: 95 % | DIASTOLIC BLOOD PRESSURE: 70 MMHG | TEMPERATURE: 97.7 F | SYSTOLIC BLOOD PRESSURE: 108 MMHG

## 2025-06-06 DIAGNOSIS — R05.9 COUGH IN ADULT: Primary | ICD-10-CM

## 2025-06-06 SDOH — ECONOMIC STABILITY: FOOD INSECURITY: WITHIN THE PAST 12 MONTHS, THE FOOD YOU BOUGHT JUST DIDN'T LAST AND YOU DIDN'T HAVE MONEY TO GET MORE.: NEVER TRUE

## 2025-06-06 SDOH — ECONOMIC STABILITY: FOOD INSECURITY: WITHIN THE PAST 12 MONTHS, YOU WORRIED THAT YOUR FOOD WOULD RUN OUT BEFORE YOU GOT MONEY TO BUY MORE.: NEVER TRUE

## 2025-06-06 ASSESSMENT — PATIENT HEALTH QUESTIONNAIRE - PHQ9
5. POOR APPETITE OR OVEREATING: NOT AT ALL
SUM OF ALL RESPONSES TO PHQ QUESTIONS 1-9: 0
7. TROUBLE CONCENTRATING ON THINGS, SUCH AS READING THE NEWSPAPER OR WATCHING TELEVISION: NOT AT ALL
3. TROUBLE FALLING OR STAYING ASLEEP: NOT AT ALL
SUM OF ALL RESPONSES TO PHQ QUESTIONS 1-9: 0
SUM OF ALL RESPONSES TO PHQ QUESTIONS 1-9: 0
4. FEELING TIRED OR HAVING LITTLE ENERGY: NOT AT ALL
SUM OF ALL RESPONSES TO PHQ QUESTIONS 1-9: 0
2. FEELING DOWN, DEPRESSED OR HOPELESS: NOT AT ALL
9. THOUGHTS THAT YOU WOULD BE BETTER OFF DEAD, OR OF HURTING YOURSELF: NOT AT ALL
1. LITTLE INTEREST OR PLEASURE IN DOING THINGS: NOT AT ALL
8. MOVING OR SPEAKING SO SLOWLY THAT OTHER PEOPLE COULD HAVE NOTICED. OR THE OPPOSITE, BEING SO FIGETY OR RESTLESS THAT YOU HAVE BEEN MOVING AROUND A LOT MORE THAN USUAL: NOT AT ALL
6. FEELING BAD ABOUT YOURSELF - OR THAT YOU ARE A FAILURE OR HAVE LET YOURSELF OR YOUR FAMILY DOWN: NOT AT ALL
10. IF YOU CHECKED OFF ANY PROBLEMS, HOW DIFFICULT HAVE THESE PROBLEMS MADE IT FOR YOU TO DO YOUR WORK, TAKE CARE OF THINGS AT HOME, OR GET ALONG WITH OTHER PEOPLE: NOT DIFFICULT AT ALL

## 2025-06-06 ASSESSMENT — ENCOUNTER SYMPTOMS
SHORTNESS OF BREATH: 0
COUGH: 1
RHINORRHEA: 1

## 2025-06-06 NOTE — PROGRESS NOTES
Ella Jerome, [facility]  [address]  [place]  [address]  [phone]  [phone]    Vincent Carrasco is a 79 y.o. male who is here with c/o of:    Chief Complaint: Cough (Pt c/ sneezing productive cough yellow  discharge x 1 day per pt.)      Patient Accompanied by: n/a    HPI - Vincent Carrasco is here today with c/o:    History of Present Illness  The patient is a 79-year-old male who presents with complaints of cold symptoms.    He began experiencing symptoms of a common cold on the morning of 06/05/2025, initially presenting as a runny nose. By the evening of 06/05/2025, the severity of his symptoms escalated, with an increase in sneezing and the onset of coughing. He also reported a mild fever, estimated to be around 100 degrees, but did not experience any chills or body aches. His symptoms were predominantly localized to his head and upper chest, with no shortness of breath reported. He had a severe cough last night, which was alleviated by a cough drop. He has been managing his symptoms with NyQuil and DayQuil, taken last night and this morning respectively.    He took Benadryl for poison ivy.       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

## 2025-06-27 ENCOUNTER — HOSPITAL ENCOUNTER (OUTPATIENT)
Age: 80
Setting detail: SPECIMEN
Discharge: HOME | End: 2025-06-27

## 2025-06-27 DIAGNOSIS — E78.2 MIXED HYPERLIPIDEMIA: ICD-10-CM

## 2025-06-27 LAB
ALBUMIN SERPL-MCNC: 4.5 G/DL (ref 3.5–5.2)
ALBUMIN/GLOB SERPL: 2.1 {RATIO} (ref 1–2.5)
ALP SERPL-CCNC: 68 U/L (ref 40–129)
ALT SERPL-CCNC: 24 U/L (ref 10–50)
AST SERPL-CCNC: 22 U/L (ref 10–50)
BILIRUB DIRECT SERPL-MCNC: 0.2 MG/DL (ref 0–0.2)
BILIRUB INDIRECT SERPL-MCNC: 0.5 MG/DL (ref 0–1)
BILIRUB SERPL-MCNC: 0.7 MG/DL (ref 0–1.2)
CHOLEST SERPL-MCNC: 138 MG/DL (ref 0–199)
CHOLESTEROL/HDL RATIO: 3.5
GLOBULIN SER CALC-MCNC: 2.1 G/DL
HDLC SERPL-MCNC: 39 MG/DL
LDLC SERPL CALC-MCNC: 58 MG/DL (ref 0–100)
PROT SERPL-MCNC: 6.6 G/DL (ref 6.6–8.7)
TRIGL SERPL-MCNC: 203 MG/DL (ref 0–149)
VLDLC SERPL CALC-MCNC: 41 MG/DL (ref 1–30)

## 2025-06-30 ENCOUNTER — RESULTS FOLLOW-UP (OUTPATIENT)
Dept: FAMILY MEDICINE CLINIC | Age: 80
End: 2025-06-30

## 2025-08-15 ENCOUNTER — OFFICE VISIT (OUTPATIENT)
Dept: FAMILY MEDICINE CLINIC | Age: 80
End: 2025-08-15
Payer: MEDICARE

## 2025-08-15 VITALS
HEART RATE: 83 BPM | SYSTOLIC BLOOD PRESSURE: 110 MMHG | WEIGHT: 245 LBS | DIASTOLIC BLOOD PRESSURE: 58 MMHG | TEMPERATURE: 97.4 F | BODY MASS INDEX: 30.62 KG/M2 | OXYGEN SATURATION: 92 %

## 2025-08-15 DIAGNOSIS — I10 ESSENTIAL HYPERTENSION: Primary | ICD-10-CM

## 2025-08-15 DIAGNOSIS — F32.2 CURRENT SEVERE EPISODE OF MAJOR DEPRESSIVE DISORDER WITHOUT PSYCHOTIC FEATURES WITHOUT PRIOR EPISODE (HCC): ICD-10-CM

## 2025-08-15 DIAGNOSIS — R97.20 ELEVATED PSA: ICD-10-CM

## 2025-08-15 DIAGNOSIS — E78.2 MIXED HYPERLIPIDEMIA: ICD-10-CM

## 2025-08-15 PROCEDURE — G8417 CALC BMI ABV UP PARAM F/U: HCPCS | Performed by: NURSE PRACTITIONER

## 2025-08-15 PROCEDURE — 3078F DIAST BP <80 MM HG: CPT | Performed by: NURSE PRACTITIONER

## 2025-08-15 PROCEDURE — 3074F SYST BP LT 130 MM HG: CPT | Performed by: NURSE PRACTITIONER

## 2025-08-15 PROCEDURE — 1159F MED LIST DOCD IN RCRD: CPT | Performed by: NURSE PRACTITIONER

## 2025-08-15 PROCEDURE — 1036F TOBACCO NON-USER: CPT | Performed by: NURSE PRACTITIONER

## 2025-08-15 PROCEDURE — 1123F ACP DISCUSS/DSCN MKR DOCD: CPT | Performed by: NURSE PRACTITIONER

## 2025-08-15 PROCEDURE — 99214 OFFICE O/P EST MOD 30 MIN: CPT | Performed by: NURSE PRACTITIONER

## 2025-08-15 PROCEDURE — G8427 DOCREV CUR MEDS BY ELIG CLIN: HCPCS | Performed by: NURSE PRACTITIONER

## 2025-08-15 PROCEDURE — 1160F RVW MEDS BY RX/DR IN RCRD: CPT | Performed by: NURSE PRACTITIONER

## 2025-08-15 RX ORDER — LISINOPRIL AND HYDROCHLOROTHIAZIDE 20; 25 MG/1; MG/1
1 TABLET ORAL DAILY
Qty: 90 TABLET | Refills: 1 | Status: CANCELLED | OUTPATIENT
Start: 2025-08-15

## 2025-08-15 RX ORDER — LISINOPRIL AND HYDROCHLOROTHIAZIDE 10; 12.5 MG/1; MG/1
1 TABLET ORAL DAILY
Qty: 90 TABLET | Refills: 1 | Status: SHIPPED | OUTPATIENT
Start: 2025-08-15

## 2025-08-15 RX ORDER — ROSUVASTATIN CALCIUM 5 MG/1
5 TABLET, COATED ORAL NIGHTLY
Qty: 90 TABLET | Refills: 1 | Status: SHIPPED | OUTPATIENT
Start: 2025-08-15

## 2025-08-15 RX ORDER — TAMSULOSIN HYDROCHLORIDE 0.4 MG/1
0.4 CAPSULE ORAL NIGHTLY
Qty: 90 CAPSULE | Refills: 1 | Status: SHIPPED | OUTPATIENT
Start: 2025-08-15

## 2025-08-15 SDOH — ECONOMIC STABILITY: FOOD INSECURITY: WITHIN THE PAST 12 MONTHS, YOU WORRIED THAT YOUR FOOD WOULD RUN OUT BEFORE YOU GOT MONEY TO BUY MORE.: NEVER TRUE

## 2025-08-15 SDOH — ECONOMIC STABILITY: FOOD INSECURITY: WITHIN THE PAST 12 MONTHS, THE FOOD YOU BOUGHT JUST DIDN'T LAST AND YOU DIDN'T HAVE MONEY TO GET MORE.: NEVER TRUE

## 2025-08-15 ASSESSMENT — PATIENT HEALTH QUESTIONNAIRE - PHQ9
7. TROUBLE CONCENTRATING ON THINGS, SUCH AS READING THE NEWSPAPER OR WATCHING TELEVISION: NOT AT ALL
3. TROUBLE FALLING OR STAYING ASLEEP: NOT AT ALL
8. MOVING OR SPEAKING SO SLOWLY THAT OTHER PEOPLE COULD HAVE NOTICED. OR THE OPPOSITE, BEING SO FIGETY OR RESTLESS THAT YOU HAVE BEEN MOVING AROUND A LOT MORE THAN USUAL: NOT AT ALL
2. FEELING DOWN, DEPRESSED OR HOPELESS: NOT AT ALL
SUM OF ALL RESPONSES TO PHQ QUESTIONS 1-9: 0
6. FEELING BAD ABOUT YOURSELF - OR THAT YOU ARE A FAILURE OR HAVE LET YOURSELF OR YOUR FAMILY DOWN: NOT AT ALL
SUM OF ALL RESPONSES TO PHQ QUESTIONS 1-9: 0
5. POOR APPETITE OR OVEREATING: NOT AT ALL
10. IF YOU CHECKED OFF ANY PROBLEMS, HOW DIFFICULT HAVE THESE PROBLEMS MADE IT FOR YOU TO DO YOUR WORK, TAKE CARE OF THINGS AT HOME, OR GET ALONG WITH OTHER PEOPLE: NOT DIFFICULT AT ALL
1. LITTLE INTEREST OR PLEASURE IN DOING THINGS: NOT AT ALL
9. THOUGHTS THAT YOU WOULD BE BETTER OFF DEAD, OR OF HURTING YOURSELF: NOT AT ALL
4. FEELING TIRED OR HAVING LITTLE ENERGY: NOT AT ALL

## (undated) DEVICE — TUBING, SUCTION, 1/4" X 12', STRAIGHT: Brand: MEDLINE

## (undated) DEVICE — BINDER ABD MULT PANEL 62-74 IN XL 9 IN ELASTIC PROCARE

## (undated) DEVICE — GAUZE,SPONGE,4"X4",16PLY,STRL,LF,10/TRAY: Brand: MEDLINE

## (undated) DEVICE — SUTURE PDS II SZ 0 L60IN ABSRB VLT L65MM TP-1 1/2 CIR Z991G

## (undated) DEVICE — SUTURE PROL SZ 3-0 L30IN NONABSORBABLE BLU L26MM SH 1/2 CIR 8832H

## (undated) DEVICE — 1-PIECE DRAINABLE OSTOMY POUCH, CERAPLUS: Brand: PREMIER

## (undated) DEVICE — MEDICINE CUP, GRADUATED, STER: Brand: MEDLINE

## (undated) DEVICE — APPLICATOR MEDICATED 26 CC SOLUTION HI LT ORNG CHLORAPREP

## (undated) DEVICE — SUTURE PERMAHAND SZ 3-0 L18IN NONABSORBABLE BLK L26MM SH C013D

## (undated) DEVICE — SURGICAL PROCEDURE KIT BASIN MAJ SET UP

## (undated) DEVICE — BLADE ES L6IN ELASTOMERIC COAT EXT DURABLE BEND UPTO 90DEG

## (undated) DEVICE — 3M™ PRECISE™ VISTA DISPOSABLE SKIN STAPLER 3995: Brand: 3M™ PRECISE™

## (undated) DEVICE — YANKAUER,POOLE TIP,STERILE,50/CS: Brand: MEDLINE

## (undated) DEVICE — GOWN,AURORA,NONREINFORCED,LARGE: Brand: MEDLINE

## (undated) DEVICE — PAD,NON-ADHERENT,3X8,STERILE,LF,1/PK: Brand: MEDLINE

## (undated) DEVICE — SUTURE PROL SZ 1 L30IN NONABSORBABLE BLU CTX L48MM 1/2 CIR 8455H

## (undated) DEVICE — SEALER ENDOSCP NANO COAT OPN DIV CRV L JAW LIGASURE IMPACT

## (undated) DEVICE — GLOVE SURG SZ 75 CRM LTX FREE POLYISOPRENE POLYMER BEAD ANTI

## (undated) DEVICE — YANKAUER,FLEXIBLE HANDLE,REGLR CAPACITY: Brand: MEDLINE INDUSTRIES, INC.

## (undated) DEVICE — BLANKET WRM W29.9XL79.1IN UP BODY FORC AIR MISTRAL-AIR

## (undated) DEVICE — SYRINGE MED 50ML LUERLOCK TIP

## (undated) DEVICE — BASIN EMSIS 700ML GRAPHITE PLAS KID SHP GRAD

## (undated) DEVICE — SUTURE CHROMIC GUT SZ 3-0 L27IN ABSRB BRN L26MM SH 1/2 CIR G122H

## (undated) DEVICE — CO2 CANNULA,SUPERSOFT, ADLT,7'O2,7'CO2: Brand: MEDLINE

## (undated) DEVICE — Device: Brand: DEFENDO VALVE AND CONNECTOR KIT

## (undated) DEVICE — TOTAL TRAY, DB, 100% SILI FOLEY, 16FR 10: Brand: MEDLINE

## (undated) DEVICE — JELLY,LUBE,STERILE,FLIP TOP,TUBE,2-OZ: Brand: MEDLINE

## (undated) DEVICE — ADAPTER TBNG LUER STUB 15 GA INTMED

## (undated) DEVICE — GARMENT,MEDLINE,DVT,INT,CALF,MED, GEN2: Brand: MEDLINE